# Patient Record
Sex: FEMALE | Race: OTHER | NOT HISPANIC OR LATINO
[De-identification: names, ages, dates, MRNs, and addresses within clinical notes are randomized per-mention and may not be internally consistent; named-entity substitution may affect disease eponyms.]

---

## 2017-01-03 ENCOUNTER — RX RENEWAL (OUTPATIENT)
Age: 57
End: 2017-01-03

## 2017-01-10 ENCOUNTER — LABORATORY RESULT (OUTPATIENT)
Age: 57
End: 2017-01-10

## 2017-01-10 ENCOUNTER — OUTPATIENT (OUTPATIENT)
Dept: OUTPATIENT SERVICES | Facility: HOSPITAL | Age: 57
LOS: 1 days | End: 2017-01-10
Payer: MEDICAID

## 2017-01-10 ENCOUNTER — APPOINTMENT (OUTPATIENT)
Dept: INTERNAL MEDICINE | Facility: CLINIC | Age: 57
End: 2017-01-10

## 2017-01-10 VITALS
SYSTOLIC BLOOD PRESSURE: 110 MMHG | WEIGHT: 170 LBS | BODY MASS INDEX: 29.75 KG/M2 | DIASTOLIC BLOOD PRESSURE: 50 MMHG | HEIGHT: 63.5 IN | HEART RATE: 84 BPM

## 2017-01-10 DIAGNOSIS — I10 ESSENTIAL (PRIMARY) HYPERTENSION: ICD-10-CM

## 2017-01-10 DIAGNOSIS — Z98.89 OTHER SPECIFIED POSTPROCEDURAL STATES: Chronic | ICD-10-CM

## 2017-01-10 PROCEDURE — G0463: CPT

## 2017-01-11 DIAGNOSIS — E11.9 TYPE 2 DIABETES MELLITUS WITHOUT COMPLICATIONS: ICD-10-CM

## 2017-01-11 DIAGNOSIS — Z00.00 ENCOUNTER FOR GENERAL ADULT MEDICAL EXAMINATION WITHOUT ABNORMAL FINDINGS: ICD-10-CM

## 2017-01-11 DIAGNOSIS — R23.2 FLUSHING: ICD-10-CM

## 2017-01-16 ENCOUNTER — FORM ENCOUNTER (OUTPATIENT)
Age: 57
End: 2017-01-16

## 2017-01-17 ENCOUNTER — APPOINTMENT (OUTPATIENT)
Dept: MAMMOGRAPHY | Facility: IMAGING CENTER | Age: 57
End: 2017-01-17

## 2017-01-17 ENCOUNTER — OUTPATIENT (OUTPATIENT)
Dept: OUTPATIENT SERVICES | Facility: HOSPITAL | Age: 57
LOS: 1 days | End: 2017-01-17
Payer: MEDICAID

## 2017-01-17 DIAGNOSIS — Z00.8 ENCOUNTER FOR OTHER GENERAL EXAMINATION: ICD-10-CM

## 2017-01-17 DIAGNOSIS — Z98.89 OTHER SPECIFIED POSTPROCEDURAL STATES: Chronic | ICD-10-CM

## 2017-01-17 PROCEDURE — 77063 BREAST TOMOSYNTHESIS BI: CPT

## 2017-01-17 PROCEDURE — 77067 SCR MAMMO BI INCL CAD: CPT

## 2017-01-24 ENCOUNTER — LABORATORY RESULT (OUTPATIENT)
Age: 57
End: 2017-01-24

## 2017-01-24 ENCOUNTER — OUTPATIENT (OUTPATIENT)
Dept: OUTPATIENT SERVICES | Facility: HOSPITAL | Age: 57
LOS: 1 days | End: 2017-01-24
Payer: MEDICAID

## 2017-01-24 ENCOUNTER — APPOINTMENT (OUTPATIENT)
Dept: INTERNAL MEDICINE | Facility: CLINIC | Age: 57
End: 2017-01-24

## 2017-01-24 VITALS
RESPIRATION RATE: 14 BRPM | DIASTOLIC BLOOD PRESSURE: 60 MMHG | HEART RATE: 80 BPM | SYSTOLIC BLOOD PRESSURE: 110 MMHG | WEIGHT: 166 LBS | BODY MASS INDEX: 28.94 KG/M2

## 2017-01-24 DIAGNOSIS — I10 ESSENTIAL (PRIMARY) HYPERTENSION: ICD-10-CM

## 2017-01-24 DIAGNOSIS — R11.0 NAUSEA: ICD-10-CM

## 2017-01-24 DIAGNOSIS — Z98.89 OTHER SPECIFIED POSTPROCEDURAL STATES: Chronic | ICD-10-CM

## 2017-01-24 LAB
HCT VFR BLD CALC: 39.4 % — SIGNIFICANT CHANGE UP (ref 34.5–45)
HGB BLD-MCNC: 12.3 G/DL — SIGNIFICANT CHANGE UP (ref 11.5–15.5)
MCHC RBC-ENTMCNC: 27.8 PG — SIGNIFICANT CHANGE UP (ref 27–34)
MCHC RBC-ENTMCNC: 31.2 GM/DL — LOW (ref 32–36)
MCV RBC AUTO: 88.9 FL — SIGNIFICANT CHANGE UP (ref 80–100)
PLATELET # BLD AUTO: 251 K/UL — SIGNIFICANT CHANGE UP (ref 150–400)
RBC # BLD: 4.43 M/UL — SIGNIFICANT CHANGE UP (ref 3.8–5.2)
RBC # FLD: 12.8 % — SIGNIFICANT CHANGE UP (ref 10.3–14.5)
WBC # BLD: 9.31 K/UL — SIGNIFICANT CHANGE UP (ref 3.8–10.5)
WBC # FLD AUTO: 9.31 K/UL — SIGNIFICANT CHANGE UP (ref 3.8–10.5)

## 2017-01-24 PROCEDURE — 86765 RUBEOLA ANTIBODY: CPT

## 2017-01-24 PROCEDURE — 81001 URINALYSIS AUTO W/SCOPE: CPT

## 2017-01-24 PROCEDURE — 86480 TB TEST CELL IMMUN MEASURE: CPT

## 2017-01-24 PROCEDURE — 85027 COMPLETE CBC AUTOMATED: CPT

## 2017-01-24 PROCEDURE — G0463: CPT

## 2017-01-24 PROCEDURE — 86762 RUBELLA ANTIBODY: CPT

## 2017-01-25 DIAGNOSIS — Z02.1 ENCOUNTER FOR PRE-EMPLOYMENT EXAMINATION: ICD-10-CM

## 2017-01-25 DIAGNOSIS — E66.3 OVERWEIGHT: ICD-10-CM

## 2017-01-25 DIAGNOSIS — E11.9 TYPE 2 DIABETES MELLITUS WITHOUT COMPLICATIONS: ICD-10-CM

## 2017-01-25 LAB
ALBUMIN SERPL ELPH-MCNC: 4.7 G/DL
ALP BLD-CCNC: 80 U/L
ALT SERPL-CCNC: 21 U/L
ANION GAP SERPL CALC-SCNC: 17 MMOL/L
APPEARANCE UR: CLEAR — SIGNIFICANT CHANGE UP
AST SERPL-CCNC: 23 U/L
BACTERIA # UR AUTO: NEGATIVE — SIGNIFICANT CHANGE UP
BASOPHILS # BLD AUTO: 0.03 K/UL
BASOPHILS NFR BLD AUTO: 0.3 %
BILIRUB SERPL-MCNC: 0.3 MG/DL
BILIRUB UR-MCNC: NEGATIVE — SIGNIFICANT CHANGE UP
BUN SERPL-MCNC: 11 MG/DL
CALCIUM SERPL-MCNC: 10.9 MG/DL
CHLORIDE SERPL-SCNC: 98 MMOL/L
CO2 SERPL-SCNC: 24 MMOL/L
COLOR SPEC: YELLOW — SIGNIFICANT CHANGE UP
CREAT SERPL-MCNC: 0.86 MG/DL
DIFF PNL FLD: NEGATIVE — SIGNIFICANT CHANGE UP
EOSINOPHIL # BLD AUTO: 0.12 K/UL
EOSINOPHIL NFR BLD AUTO: 1.3 %
EPI CELLS # UR: 1 /HPF — SIGNIFICANT CHANGE UP (ref 0–5)
GLUCOSE SERPL-MCNC: 93 MG/DL
GLUCOSE UR QL: NEGATIVE MG/DL — SIGNIFICANT CHANGE UP
HCT VFR BLD CALC: 40.7 %
HGB BLD-MCNC: 12.5 G/DL
IMM GRANULOCYTES NFR BLD AUTO: 0.2 %
KETONES UR-MCNC: NEGATIVE — SIGNIFICANT CHANGE UP
LEUKOCYTE ESTERASE UR-ACNC: NEGATIVE — SIGNIFICANT CHANGE UP
LYMPHOCYTES # BLD AUTO: 3.83 K/UL
LYMPHOCYTES NFR BLD AUTO: 40.2 %
MAN DIFF?: NORMAL
MCHC RBC-ENTMCNC: 27.5 PG
MCHC RBC-ENTMCNC: 30.7 GM/DL
MCV RBC AUTO: 89.6 FL
MEV IGG SER-ACNC: >300 AU/ML — SIGNIFICANT CHANGE UP
MEV IGG+IGM SER-IMP: POSITIVE — SIGNIFICANT CHANGE UP
MONOCYTES # BLD AUTO: 0.79 K/UL
MONOCYTES NFR BLD AUTO: 8.3 %
NEUTROPHILS # BLD AUTO: 4.74 K/UL
NEUTROPHILS NFR BLD AUTO: 49.7 %
NITRITE UR-MCNC: NEGATIVE — SIGNIFICANT CHANGE UP
PH UR: 7.5 — SIGNIFICANT CHANGE UP (ref 5–8)
PLATELET # BLD AUTO: 295 K/UL
POTASSIUM SERPL-SCNC: 5.5 MMOL/L
PROT SERPL-MCNC: 7.8 G/DL
PROT UR-MCNC: NEGATIVE MG/DL — SIGNIFICANT CHANGE UP
RBC # BLD: 4.54 M/UL
RBC # FLD: 12.7 %
RBC CASTS # UR COMP ASSIST: 0 /HPF — SIGNIFICANT CHANGE UP (ref 0–4)
RUBV IGG SER-ACNC: 13.5 INDEX — SIGNIFICANT CHANGE UP
RUBV IGG SER-IMP: POSITIVE — SIGNIFICANT CHANGE UP
SODIUM SERPL-SCNC: 139 MMOL/L
SP GR SPEC: 1.01 — SIGNIFICANT CHANGE UP (ref 1.01–1.02)
TSH SERPL-ACNC: 1 UIU/ML
UROBILINOGEN FLD QL: NEGATIVE MG/DL — SIGNIFICANT CHANGE UP
WBC # FLD AUTO: 9.53 K/UL
WBC UR QL: 1 /HPF — SIGNIFICANT CHANGE UP (ref 0–5)

## 2017-01-26 LAB
M TB TUBERC IFN-G BLD QL: -0.11 IU/ML — SIGNIFICANT CHANGE UP
M TB TUBERC IFN-G BLD QL: 0.24 IU/ML — SIGNIFICANT CHANGE UP
M TB TUBERC IFN-G BLD QL: NEGATIVE — SIGNIFICANT CHANGE UP
MITOGEN IGNF BCKGRD COR BLD-ACNC: >10 IU/ML — SIGNIFICANT CHANGE UP

## 2017-01-30 ENCOUNTER — APPOINTMENT (OUTPATIENT)
Dept: INTERNAL MEDICINE | Facility: CLINIC | Age: 57
End: 2017-01-30

## 2017-03-11 ENCOUNTER — INPATIENT (INPATIENT)
Facility: HOSPITAL | Age: 57
LOS: 3 days | Discharge: HOME | DRG: 247 | End: 2017-03-15
Attending: HOSPITALIST | Admitting: INTERNAL MEDICINE
Payer: MEDICAID

## 2017-03-11 VITALS
DIASTOLIC BLOOD PRESSURE: 75 MMHG | TEMPERATURE: 98 F | HEART RATE: 89 BPM | RESPIRATION RATE: 18 BRPM | OXYGEN SATURATION: 100 % | SYSTOLIC BLOOD PRESSURE: 139 MMHG

## 2017-03-11 DIAGNOSIS — Z98.89 OTHER SPECIFIED POSTPROCEDURAL STATES: Chronic | ICD-10-CM

## 2017-03-11 LAB
ALBUMIN SERPL ELPH-MCNC: 4.4 G/DL — SIGNIFICANT CHANGE UP (ref 3.3–5)
ALP SERPL-CCNC: 87 U/L — SIGNIFICANT CHANGE UP (ref 40–120)
ALT FLD-CCNC: 19 U/L RC — SIGNIFICANT CHANGE UP (ref 10–45)
ANION GAP SERPL CALC-SCNC: 19 MMOL/L — HIGH (ref 5–17)
AST SERPL-CCNC: 18 U/L — SIGNIFICANT CHANGE UP (ref 10–40)
BASOPHILS # BLD AUTO: 0 K/UL — SIGNIFICANT CHANGE UP (ref 0–0.2)
BASOPHILS NFR BLD AUTO: 0.2 % — SIGNIFICANT CHANGE UP (ref 0–2)
BILIRUB SERPL-MCNC: 0.3 MG/DL — SIGNIFICANT CHANGE UP (ref 0.2–1.2)
BUN SERPL-MCNC: 14 MG/DL — SIGNIFICANT CHANGE UP (ref 7–23)
CALCIUM SERPL-MCNC: 10 MG/DL — SIGNIFICANT CHANGE UP (ref 8.4–10.5)
CHLORIDE SERPL-SCNC: 97 MMOL/L — SIGNIFICANT CHANGE UP (ref 96–108)
CO2 SERPL-SCNC: 22 MMOL/L — SIGNIFICANT CHANGE UP (ref 22–31)
CREAT SERPL-MCNC: 0.8 MG/DL — SIGNIFICANT CHANGE UP (ref 0.5–1.3)
D DIMER BLD IA.RAPID-MCNC: 151 NG/ML DDU — SIGNIFICANT CHANGE UP
EOSINOPHIL # BLD AUTO: 0.1 K/UL — SIGNIFICANT CHANGE UP (ref 0–0.5)
EOSINOPHIL NFR BLD AUTO: 0.8 % — SIGNIFICANT CHANGE UP (ref 0–6)
GLUCOSE SERPL-MCNC: 177 MG/DL — HIGH (ref 70–99)
HCT VFR BLD CALC: 35.9 % — SIGNIFICANT CHANGE UP (ref 34.5–45)
HGB BLD-MCNC: 11.6 G/DL — SIGNIFICANT CHANGE UP (ref 11.5–15.5)
LYMPHOCYTES # BLD AUTO: 2.4 K/UL — SIGNIFICANT CHANGE UP (ref 1–3.3)
LYMPHOCYTES # BLD AUTO: 27.6 % — SIGNIFICANT CHANGE UP (ref 13–44)
MCHC RBC-ENTMCNC: 27.9 PG — SIGNIFICANT CHANGE UP (ref 27–34)
MCHC RBC-ENTMCNC: 32.5 GM/DL — SIGNIFICANT CHANGE UP (ref 32–36)
MCV RBC AUTO: 86.1 FL — SIGNIFICANT CHANGE UP (ref 80–100)
MONOCYTES # BLD AUTO: 0.8 K/UL — SIGNIFICANT CHANGE UP (ref 0–0.9)
MONOCYTES NFR BLD AUTO: 8.8 % — SIGNIFICANT CHANGE UP (ref 2–14)
NEUTROPHILS # BLD AUTO: 5.4 K/UL — SIGNIFICANT CHANGE UP (ref 1.8–7.4)
NEUTROPHILS NFR BLD AUTO: 62.6 % — SIGNIFICANT CHANGE UP (ref 43–77)
NT-PROBNP SERPL-SCNC: 35 PG/ML — SIGNIFICANT CHANGE UP (ref 0–300)
PLATELET # BLD AUTO: 246 K/UL — SIGNIFICANT CHANGE UP (ref 150–400)
POTASSIUM SERPL-MCNC: 4.3 MMOL/L — SIGNIFICANT CHANGE UP (ref 3.5–5.3)
POTASSIUM SERPL-SCNC: 4.3 MMOL/L — SIGNIFICANT CHANGE UP (ref 3.5–5.3)
PROT SERPL-MCNC: 7.7 G/DL — SIGNIFICANT CHANGE UP (ref 6–8.3)
RBC # BLD: 4.17 M/UL — SIGNIFICANT CHANGE UP (ref 3.8–5.2)
RBC # FLD: 11.2 % — SIGNIFICANT CHANGE UP (ref 10.3–14.5)
SODIUM SERPL-SCNC: 138 MMOL/L — SIGNIFICANT CHANGE UP (ref 135–145)
TROPONIN T SERPL-MCNC: <0.01 NG/ML — SIGNIFICANT CHANGE UP (ref 0–0.06)
TROPONIN T SERPL-MCNC: <0.01 NG/ML — SIGNIFICANT CHANGE UP (ref 0–0.06)
WBC # BLD: 8.7 K/UL — SIGNIFICANT CHANGE UP (ref 3.8–10.5)
WBC # FLD AUTO: 8.7 K/UL — SIGNIFICANT CHANGE UP (ref 3.8–10.5)

## 2017-03-11 PROCEDURE — 71020: CPT | Mod: 26

## 2017-03-11 PROCEDURE — 93010 ELECTROCARDIOGRAM REPORT: CPT

## 2017-03-11 PROCEDURE — 99220: CPT | Mod: 25

## 2017-03-11 RX ORDER — GLUCAGON INJECTION, SOLUTION 0.5 MG/.1ML
1 INJECTION, SOLUTION SUBCUTANEOUS ONCE
Qty: 0 | Refills: 0 | Status: DISCONTINUED | OUTPATIENT
Start: 2017-03-11 | End: 2017-03-15

## 2017-03-11 RX ORDER — INSULIN GLARGINE 100 [IU]/ML
15 INJECTION, SOLUTION SUBCUTANEOUS AT BEDTIME
Qty: 0 | Refills: 0 | Status: DISCONTINUED | OUTPATIENT
Start: 2017-03-11 | End: 2017-03-15

## 2017-03-11 RX ORDER — IPRATROPIUM/ALBUTEROL SULFATE 18-103MCG
3 AEROSOL WITH ADAPTER (GRAM) INHALATION ONCE
Qty: 0 | Refills: 0 | Status: COMPLETED | OUTPATIENT
Start: 2017-03-11 | End: 2017-03-11

## 2017-03-11 RX ORDER — ATORVASTATIN CALCIUM 80 MG/1
20 TABLET, FILM COATED ORAL AT BEDTIME
Qty: 0 | Refills: 0 | Status: DISCONTINUED | OUTPATIENT
Start: 2017-03-11 | End: 2017-03-12

## 2017-03-11 RX ORDER — SODIUM CHLORIDE 9 MG/ML
1000 INJECTION, SOLUTION INTRAVENOUS
Qty: 0 | Refills: 0 | Status: DISCONTINUED | OUTPATIENT
Start: 2017-03-11 | End: 2017-03-15

## 2017-03-11 RX ORDER — INSULIN LISPRO 100/ML
VIAL (ML) SUBCUTANEOUS
Qty: 0 | Refills: 0 | Status: DISCONTINUED | OUTPATIENT
Start: 2017-03-11 | End: 2017-03-15

## 2017-03-11 RX ORDER — LISINOPRIL 2.5 MG/1
10 TABLET ORAL DAILY
Qty: 0 | Refills: 0 | Status: DISCONTINUED | OUTPATIENT
Start: 2017-03-11 | End: 2017-03-15

## 2017-03-11 RX ORDER — DEXTROSE 50 % IN WATER 50 %
1 SYRINGE (ML) INTRAVENOUS ONCE
Qty: 0 | Refills: 0 | Status: DISCONTINUED | OUTPATIENT
Start: 2017-03-11 | End: 2017-03-15

## 2017-03-11 RX ORDER — METFORMIN HYDROCHLORIDE 850 MG/1
1000 TABLET ORAL
Qty: 0 | Refills: 0 | Status: DISCONTINUED | OUTPATIENT
Start: 2017-03-11 | End: 2017-03-12

## 2017-03-11 RX ORDER — INSULIN LISPRO 100/ML
15 VIAL (ML) SUBCUTANEOUS
Qty: 0 | Refills: 0 | Status: DISCONTINUED | OUTPATIENT
Start: 2017-03-11 | End: 2017-03-13

## 2017-03-11 RX ORDER — FLUTICASONE PROPIONATE AND SALMETEROL 50; 250 UG/1; UG/1
1 POWDER ORAL; RESPIRATORY (INHALATION)
Qty: 0 | Refills: 0 | Status: DISCONTINUED | OUTPATIENT
Start: 2017-03-11 | End: 2017-03-15

## 2017-03-11 RX ORDER — ALBUTEROL 90 UG/1
2 AEROSOL, METERED ORAL EVERY 6 HOURS
Qty: 0 | Refills: 0 | Status: DISCONTINUED | OUTPATIENT
Start: 2017-03-11 | End: 2017-03-15

## 2017-03-11 RX ORDER — ASPIRIN/CALCIUM CARB/MAGNESIUM 324 MG
243 TABLET ORAL ONCE
Qty: 0 | Refills: 0 | Status: COMPLETED | OUTPATIENT
Start: 2017-03-11 | End: 2017-03-11

## 2017-03-11 RX ORDER — DEXTROSE 50 % IN WATER 50 %
25 SYRINGE (ML) INTRAVENOUS ONCE
Qty: 0 | Refills: 0 | Status: DISCONTINUED | OUTPATIENT
Start: 2017-03-11 | End: 2017-03-15

## 2017-03-11 RX ORDER — ASPIRIN/CALCIUM CARB/MAGNESIUM 324 MG
81 TABLET ORAL DAILY
Qty: 0 | Refills: 0 | Status: DISCONTINUED | OUTPATIENT
Start: 2017-03-11 | End: 2017-03-15

## 2017-03-11 RX ORDER — ACETAMINOPHEN 500 MG
975 TABLET ORAL ONCE
Qty: 0 | Refills: 0 | Status: COMPLETED | OUTPATIENT
Start: 2017-03-11 | End: 2017-03-11

## 2017-03-11 RX ORDER — SODIUM CHLORIDE 9 MG/ML
3 INJECTION INTRAMUSCULAR; INTRAVENOUS; SUBCUTANEOUS ONCE
Qty: 0 | Refills: 0 | Status: COMPLETED | OUTPATIENT
Start: 2017-03-11 | End: 2017-03-11

## 2017-03-11 RX ORDER — DEXTROSE 50 % IN WATER 50 %
12.5 SYRINGE (ML) INTRAVENOUS ONCE
Qty: 0 | Refills: 0 | Status: DISCONTINUED | OUTPATIENT
Start: 2017-03-11 | End: 2017-03-15

## 2017-03-11 RX ADMIN — Medication 243 MILLIGRAM(S): at 15:46

## 2017-03-11 RX ADMIN — METFORMIN HYDROCHLORIDE 1000 MILLIGRAM(S): 850 TABLET ORAL at 18:14

## 2017-03-11 RX ADMIN — Medication 975 MILLIGRAM(S): at 15:46

## 2017-03-11 RX ADMIN — Medication 975 MILLIGRAM(S): at 15:47

## 2017-03-11 RX ADMIN — Medication 2: at 18:13

## 2017-03-11 RX ADMIN — Medication 3 MILLILITER(S): at 15:46

## 2017-03-11 RX ADMIN — ALBUTEROL 2 PUFF(S): 90 AEROSOL, METERED ORAL at 18:03

## 2017-03-11 RX ADMIN — INSULIN GLARGINE 15 UNIT(S): 100 INJECTION, SOLUTION SUBCUTANEOUS at 21:37

## 2017-03-11 RX ADMIN — Medication 15 UNIT(S): at 18:13

## 2017-03-11 RX ADMIN — FLUTICASONE PROPIONATE AND SALMETEROL 1 DOSE(S): 50; 250 POWDER ORAL; RESPIRATORY (INHALATION) at 18:03

## 2017-03-11 RX ADMIN — ATORVASTATIN CALCIUM 20 MILLIGRAM(S): 80 TABLET, FILM COATED ORAL at 21:38

## 2017-03-11 RX ADMIN — SODIUM CHLORIDE 3 MILLILITER(S): 9 INJECTION INTRAMUSCULAR; INTRAVENOUS; SUBCUTANEOUS at 15:46

## 2017-03-11 NOTE — ED CDU PROVIDER NOTE - PROGRESS NOTE DETAILS
CDU PROGRESS NOTE PANCHO COLUNGA: Received pt from PANCHO Benitez at 1900 sign-out. Pt resting in stretcher in NAD. Case/plan reviewed. VSS. Pt ate dinner. Ambulatory around unit with steady gait. S1 S2 noted, RRR, lungs CTA b/l, BS x4 with soft, nontender abdomen. Pt without complaints. Will continue to monitor overnight. CDU NOTE PANCHO COLUNGA: Pt resting comfortably, feeling well without complaint. NAD, VSS. No events on telemetry. CDU NOTE PANCHO SHOEMAKER: NAD VSS.  Patient resting comfortably and has no current complaints. no events on tele. awaiting stress. .patient at stress. - Patricia Benitez PA-C MD Allen:  I have personally performed a face to face diagnostic evaluation on this patient with the PA.  I have reviewed the ACP note and agree with the history, exam, and plan of care, except as noted.  History and Exam by me shows a 57yo F, sent to CDU for evaluation 1 wk pleuritic CP.  Workup in ED included (-) ddmier, normal ECG, and unremarkable enzymes.  Send to CDU for stress test.  Symptoms have significantly improved since CDU arrival.  VS - wnl.  Physical Exam: RRR, CTA B, no lower extremity edema.  Impression:  atypical CP; uncertain cause.  Plan:  if stress test wnl --> d/c home to f/u PCP, strict return precautions. spoke with cardiology as well as Dr Allen, will plan for admission for cath. patient apparantly had a near 70% lesion in 2014. patient agrees with plan for admission. - Patricia Benitez PA-C MD Allen:  abnormal stress with preexisting 70% LAD lesion on prior cath.  Plan:  admit for cath tomorrow.

## 2017-03-11 NOTE — ED PROVIDER NOTE - MEDICAL DECISION MAKING DETAILS
Adult female with cp/sob/coug hx asthma, dm,htn,hld concerns for acs,low risk for pe, will do ekg/trop/dimer/cxr. if neg offer pt cdu for stress.

## 2017-03-11 NOTE — ED PROVIDER NOTE - OBJECTIVE STATEMENT
865 clinic  56y female pmh Asthma, DM, HLD, no habits, travel, cancer. Born [place], [place] 15y, Employed [employer], Last cath  no coronary artery disease, ([facility]), Pt comes to ed complains of SOB 1week, with occasional cough, also complains of chest pain worse with breathing. Pain stabbing sharp. rt side of chest. +/- rad to back. No sputum no hemoptysis, No weight loss. No abd pain. No syncope. No palps. Pain 4/10. Constant.Pain lessened if breathing through mouth. Mild ha as well. +flu vaccine  FH Mother 72 alvive HTN, Father  age 72, DM, Sib 2 Brothers A&W, 2 Sisters,  with DM,

## 2017-03-11 NOTE — ED CDU PROVIDER NOTE - FAMILY HISTORY
Mother  Still living? Unknown  Family history of essential hypertension, Age at diagnosis: Age Unknown     Father  Still living? Unknown  Family history of heart disease, Age at diagnosis: Age Unknown  Family history of diabetes mellitus, Age at diagnosis: Age Unknown

## 2017-03-11 NOTE — ED CDU PROVIDER NOTE - PLAN OF CARE
1. Rest. Hydrate. Continue medications as prescribed.   2. Follow up with your PMD at the medicine clinic [phone] in 2-3 days with copies of your results.  3. Return to ER for new or worsened chest pain, difficulty breathing, palpitations, dizziness, drenching sweats, fever, passing out or any concern.

## 2017-03-11 NOTE — ED CDU PROVIDER NOTE - OBJECTIVE STATEMENT
56y female pmh Asthma, DM, HLD, Last cath 2015 with 50% lesion of unknown artery, Pt comes to ed complains of SOB 1week, with occasional cough, also complains of chest pain worse with breathing. Pain stabbing sharp. rt side of chest. +/- rad to back. No sputum no hemoptysis, No weight loss. No abd pain. No syncope. No palps. Pain 4/10. Constant.

## 2017-03-11 NOTE — ED CDU PROVIDER NOTE - MEDICAL DECISION MAKING DETAILS
adult female with dm/htn/hld pw 1wk of chest pain. Constant.worse with resp. Eval for pe pt low risk without tachycaridia/risk factors/hypoxia dimer neg dx excluded.. Pain rad sometimes to back dimer neg at low risk for disection. Check xray. Ekg trop ro acs.

## 2017-03-11 NOTE — ED ADULT NURSE REASSESSMENT NOTE - NS ED NURSE REASSESS COMMENT FT1
Peak Flow 300
pt arrived and oriented to CDU.  Pt A&OX3.  VSS.  No acute distress.  pt NSR 70-80 bpm on cardiac monitor.  pt currently denies any CP/SOB.  pt due to have repeat EKG and CE#2 at 2803.  plan of care explained.  safety maintained.  will cont to monitor.
Received pt from WILL Grimaldo, received pt alert and responsive, oriented x4, denies any respiratory distress, SOB, or difficulty breathing. Pt transferred to CDU for observation for chest pain, pt currently denies pain or discomfort pt is SR on tele 80's, pt pending 2nd CE with EKG at 2145, pending stress test in AM. IV in place, patent and free of signs of infiltration,  pt denies chest pain or palpitations, V/S stable, pt afebrile, pt denies pain at this time. Pt educated on unit and unit rules, instructed patient to notify RN of any needed assistance, Pt verbalizes understanding, Call bell placed within reach. Safety maintained. Will continue to monitor.

## 2017-03-11 NOTE — ED CDU PROVIDER NOTE - ATTENDING CONTRIBUTION TO CARE
I have personally performed a face to face diagnostic evaluation on this patient.  I have reviewed the ACP note and agree with the history, exam, and plan of care, except as noted.  History and Exam by me shows  See ED provider note  Jose Alejandro Frausto MD, Facep

## 2017-03-11 NOTE — ED ADULT NURSE NOTE - OBJECTIVE STATEMENT
55yo female pt AxOx3 ambulatory to ED c/o SOB x1wk. Pt states little to no relief from use of inhalers. Pt c/o CP upon inspiration. Denies N/V/fever/chills. B/L lungs CTA, resp even, unlabored. No resp distress noted. VSS. Afebrile. Safety maintained. MD at bedside for eval. Family at bedside. Awaiting dispo.

## 2017-03-12 DIAGNOSIS — R07.9 CHEST PAIN, UNSPECIFIED: ICD-10-CM

## 2017-03-12 DIAGNOSIS — Z41.8 ENCOUNTER FOR OTHER PROCEDURES FOR PURPOSES OTHER THAN REMEDYING HEALTH STATE: ICD-10-CM

## 2017-03-12 DIAGNOSIS — I20.0 UNSTABLE ANGINA: ICD-10-CM

## 2017-03-12 DIAGNOSIS — I10 ESSENTIAL (PRIMARY) HYPERTENSION: ICD-10-CM

## 2017-03-12 DIAGNOSIS — I25.10 ATHEROSCLEROTIC HEART DISEASE OF NATIVE CORONARY ARTERY WITHOUT ANGINA PECTORIS: ICD-10-CM

## 2017-03-12 DIAGNOSIS — E11.9 TYPE 2 DIABETES MELLITUS WITHOUT COMPLICATIONS: ICD-10-CM

## 2017-03-12 DIAGNOSIS — E78.5 HYPERLIPIDEMIA, UNSPECIFIED: ICD-10-CM

## 2017-03-12 DIAGNOSIS — J45.909 UNSPECIFIED ASTHMA, UNCOMPLICATED: ICD-10-CM

## 2017-03-12 LAB
CHOLEST SERPL-MCNC: 126 MG/DL — SIGNIFICANT CHANGE UP (ref 10–199)
HBA1C BLD-MCNC: 8.9 % — HIGH (ref 4–5.6)
HDLC SERPL-MCNC: 64 MG/DL — SIGNIFICANT CHANGE UP (ref 40–125)
LIPID PNL WITH DIRECT LDL SERPL: 44 MG/DL — SIGNIFICANT CHANGE UP
TOTAL CHOLESTEROL/HDL RATIO MEASUREMENT: 2 RATIO — LOW (ref 3.3–7.1)
TRIGL SERPL-MCNC: 90 MG/DL — SIGNIFICANT CHANGE UP (ref 10–149)

## 2017-03-12 PROCEDURE — 93010 ELECTROCARDIOGRAM REPORT: CPT | Mod: 76

## 2017-03-12 PROCEDURE — 99217: CPT

## 2017-03-12 PROCEDURE — 78452 HT MUSCLE IMAGE SPECT MULT: CPT | Mod: 26

## 2017-03-12 PROCEDURE — 99223 1ST HOSP IP/OBS HIGH 75: CPT | Mod: GC

## 2017-03-12 PROCEDURE — 93016 CV STRESS TEST SUPVJ ONLY: CPT

## 2017-03-12 PROCEDURE — 93018 CV STRESS TEST I&R ONLY: CPT

## 2017-03-12 RX ORDER — ATORVASTATIN CALCIUM 80 MG/1
80 TABLET, FILM COATED ORAL AT BEDTIME
Qty: 0 | Refills: 0 | Status: DISCONTINUED | OUTPATIENT
Start: 2017-03-12 | End: 2017-03-13

## 2017-03-12 RX ORDER — METOPROLOL TARTRATE 50 MG
25 TABLET ORAL
Qty: 0 | Refills: 0 | Status: DISCONTINUED | OUTPATIENT
Start: 2017-03-12 | End: 2017-03-15

## 2017-03-12 RX ADMIN — Medication 81 MILLIGRAM(S): at 13:04

## 2017-03-12 RX ADMIN — LISINOPRIL 10 MILLIGRAM(S): 2.5 TABLET ORAL at 13:04

## 2017-03-12 RX ADMIN — INSULIN GLARGINE 15 UNIT(S): 100 INJECTION, SOLUTION SUBCUTANEOUS at 22:34

## 2017-03-12 RX ADMIN — Medication: at 18:03

## 2017-03-12 RX ADMIN — Medication 15 UNIT(S): at 18:03

## 2017-03-12 RX ADMIN — Medication 5: at 13:04

## 2017-03-12 RX ADMIN — FLUTICASONE PROPIONATE AND SALMETEROL 1 DOSE(S): 50; 250 POWDER ORAL; RESPIRATORY (INHALATION) at 23:00

## 2017-03-12 RX ADMIN — Medication 15 UNIT(S): at 13:05

## 2017-03-12 RX ADMIN — ATORVASTATIN CALCIUM 80 MILLIGRAM(S): 80 TABLET, FILM COATED ORAL at 22:34

## 2017-03-12 RX ADMIN — Medication 25 MILLIGRAM(S): at 22:34

## 2017-03-12 RX ADMIN — FLUTICASONE PROPIONATE AND SALMETEROL 1 DOSE(S): 50; 250 POWDER ORAL; RESPIRATORY (INHALATION) at 05:07

## 2017-03-12 RX ADMIN — METFORMIN HYDROCHLORIDE 1000 MILLIGRAM(S): 850 TABLET ORAL at 13:03

## 2017-03-12 NOTE — H&P ADULT. - PROBLEM SELECTOR PLAN 1
On admission negative EKG with negative Dionicio x2. NM cardiac stress test revealed mild defects in the anteroapical and apical lateral walls and after correlation with previous catheterization, 50% stenosis in PLAD may correlate.  -F/u Cards recs on medical management and catheterization   -Will continue aspirin and statin On admission negative EKG with negative Dionicio x2 but endorsing chest pain at rest and exertion. NM cardiac stress test revealed mild defects in the anteroapical and apical lateral walls and after correlation with previous catheterization, 50% stenosis in PLAD may correlate.  -Cardiology didn't recommend loading with heparin or plavix as she doesn't endorse chest pain at this time   -Will continue aspirin and statin On admission negative EKG with negative Dionicio x2 but endorsing chest pain at rest and exertion. NM cardiac stress test revealed mild defects in the anteroapical and apical lateral walls and after correlation with previous catheterization, 50% stenosis in PLAD may correlate.  -Cardiology didn't recommend loading with heparin or plavix as she doesn't endorse chest pain at this time   -Will continue aspirin 81, increase lipitor to 80mg qHS, and start metoprolol 25 BID

## 2017-03-12 NOTE — H&P ADULT. - PROBLEM SELECTOR PLAN 2
[place] PRN Patient doesn't endorse wheezing, doesn't appear to be acute asthma exacerbation  Inhalers PRN

## 2017-03-12 NOTE — H&P ADULT. - ASSESSMENT
Pt is a 57 yo F with HLD, T2DM, CAD (non-occlusive on cath in 2014 at [facility]) and asthma that presented to the ED with SOB with pleuritic chest pain. No EKG changes on admission with negative cardiac enzymes but NM cardiac stress test revealed mild defects in the anteroapical and apical lateral walls and after correlation with previous catheterization cardiology recommended further workup.

## 2017-03-12 NOTE — H&P ADULT. - PROBLEM SELECTOR PLAN 3
A1C 8.9  On A1C 8.9  On Tujeo 15 BID with Humalog TID outpatient along with Metformin   Will be conservative giving 15 Lantus at Bedtime and 5 Humalog premeal and titrate up as needed Type 2  HgA1C 8.9  On Tujeo 15 BID with Humalog TID outpatient along with Metformin   Will be conservative giving 15 Lantus at Bedtime and 5 Humalog premeal and titrate up as needed

## 2017-03-12 NOTE — H&P ADULT. - OTHER
NM Stress Test   * Baseline ECG: Nonspecific ST-T wave abnormality.  * ECG Changes: There were less than 0.5 mm horizontal ST  segment depressions in leads V4-V6 starting at 4:09 min of  exercise at 139 bpm and persisting 1:00 min in recovery.  These changes did not meet ischemic criteria.  * Arrhythmia: None.  * The left ventricle was normal in size. Technically  difficult study.  There are small, very mild defects in  the anteroapical and apical lateral walls that are  reversible suggestive of minimal ischemia.  * Mildly increased right ventricular tracer uptake is  noted on stress imaging.  * There are also mild defects in the inferior,  inferolateral, and inferoseptal walls that are fixed,  partially correct with prone imaging, and demonstrate  normal wall motion suggestive of attenuation artifacts.  * Post-stress gated wall motion analysis was performed  (LVEF > 70%;LVEDV = 50 ml.) revealing normal overall left  ventricular ejection fraction.

## 2017-03-12 NOTE — H&P ADULT. - FAMILY HISTORY
Father  Still living? Unknown  Family history of heart disease, Age at diagnosis: Age Unknown  Family history of diabetes mellitus, Age at diagnosis: Age Unknown     Mother  Still living? Unknown  Family history of essential hypertension, Age at diagnosis: Age Unknown

## 2017-03-12 NOTE — H&P ADULT. - HISTORY OF PRESENT ILLNESS
Pt is a 55 yo F with HLD, T2DM and asthma that presented to the ED with SOB with pleuritic chest pain. Patient states that over the last week the patient has had persistent SOB. She initially thought it was a viral URI and started taking her inhalers for sx relief but they were not effective. The SOB became associated with pleuritic chest pain. She stated the chest pain was sharp in character and would cause paresthesias in her fingertips but denied radiating pain. She states neither the SOB or the associated chestpain was associated with activi. Denied any fevers, Cough, nasal congestion, indigestion, constipation or dysuria.  In ED VSS. EKG WNL with negative enzymes x2. Cardiac stress test performed small, very mild defects in the anteroapical and apical lateral walls that are reversible suggestive of minimal ischemia.

## 2017-03-13 ENCOUNTER — TRANSCRIPTION ENCOUNTER (OUTPATIENT)
Age: 57
End: 2017-03-13

## 2017-03-13 LAB
ANION GAP SERPL CALC-SCNC: 15 MMOL/L — SIGNIFICANT CHANGE UP (ref 5–17)
APTT BLD: 31.7 SEC — SIGNIFICANT CHANGE UP (ref 27.5–37.4)
BASOPHILS # BLD AUTO: 0 K/UL — SIGNIFICANT CHANGE UP (ref 0–0.2)
BASOPHILS NFR BLD AUTO: 0.4 % — SIGNIFICANT CHANGE UP (ref 0–2)
BUN SERPL-MCNC: 12 MG/DL — SIGNIFICANT CHANGE UP (ref 7–23)
CALCIUM SERPL-MCNC: 9.7 MG/DL — SIGNIFICANT CHANGE UP (ref 8.4–10.5)
CHLORIDE SERPL-SCNC: 98 MMOL/L — SIGNIFICANT CHANGE UP (ref 96–108)
CO2 SERPL-SCNC: 24 MMOL/L — SIGNIFICANT CHANGE UP (ref 22–31)
CREAT SERPL-MCNC: 0.7 MG/DL — SIGNIFICANT CHANGE UP (ref 0.5–1.3)
EOSINOPHIL # BLD AUTO: 0.2 K/UL — SIGNIFICANT CHANGE UP (ref 0–0.5)
EOSINOPHIL NFR BLD AUTO: 1.9 % — SIGNIFICANT CHANGE UP (ref 0–6)
GLUCOSE SERPL-MCNC: 117 MG/DL — HIGH (ref 70–99)
HCT VFR BLD CALC: 35.6 % — SIGNIFICANT CHANGE UP (ref 34.5–45)
HGB BLD-MCNC: 12 G/DL — SIGNIFICANT CHANGE UP (ref 11.5–15.5)
INR BLD: 1.1 RATIO — SIGNIFICANT CHANGE UP (ref 0.88–1.16)
LYMPHOCYTES # BLD AUTO: 2.6 K/UL — SIGNIFICANT CHANGE UP (ref 1–3.3)
LYMPHOCYTES # BLD AUTO: 28.3 % — SIGNIFICANT CHANGE UP (ref 13–44)
MAGNESIUM SERPL-MCNC: 1.5 MG/DL — LOW (ref 1.6–2.6)
MCHC RBC-ENTMCNC: 28.8 PG — SIGNIFICANT CHANGE UP (ref 27–34)
MCHC RBC-ENTMCNC: 33.6 GM/DL — SIGNIFICANT CHANGE UP (ref 32–36)
MCV RBC AUTO: 85.7 FL — SIGNIFICANT CHANGE UP (ref 80–100)
MONOCYTES # BLD AUTO: 0.8 K/UL — SIGNIFICANT CHANGE UP (ref 0–0.9)
MONOCYTES NFR BLD AUTO: 8.8 % — SIGNIFICANT CHANGE UP (ref 2–14)
NEUTROPHILS # BLD AUTO: 5.6 K/UL — SIGNIFICANT CHANGE UP (ref 1.8–7.4)
NEUTROPHILS NFR BLD AUTO: 60.6 % — SIGNIFICANT CHANGE UP (ref 43–77)
PHOSPHATE SERPL-MCNC: 3.6 MG/DL — SIGNIFICANT CHANGE UP (ref 2.5–4.5)
PLATELET # BLD AUTO: 254 K/UL — SIGNIFICANT CHANGE UP (ref 150–400)
POTASSIUM SERPL-MCNC: 4.4 MMOL/L — SIGNIFICANT CHANGE UP (ref 3.5–5.3)
POTASSIUM SERPL-SCNC: 4.4 MMOL/L — SIGNIFICANT CHANGE UP (ref 3.5–5.3)
PROTHROM AB SERPL-ACNC: 12 SEC — SIGNIFICANT CHANGE UP (ref 10–13.1)
RBC # BLD: 4.16 M/UL — SIGNIFICANT CHANGE UP (ref 3.8–5.2)
RBC # FLD: 11.2 % — SIGNIFICANT CHANGE UP (ref 10.3–14.5)
SODIUM SERPL-SCNC: 137 MMOL/L — SIGNIFICANT CHANGE UP (ref 135–145)
WBC # BLD: 9.3 K/UL — SIGNIFICANT CHANGE UP (ref 3.8–10.5)
WBC # FLD AUTO: 9.3 K/UL — SIGNIFICANT CHANGE UP (ref 3.8–10.5)

## 2017-03-13 PROCEDURE — 92928 PRQ TCAT PLMT NTRAC ST 1 LES: CPT | Mod: RC,GC

## 2017-03-13 PROCEDURE — 93458 L HRT ARTERY/VENTRICLE ANGIO: CPT | Mod: 26,59,GC

## 2017-03-13 PROCEDURE — 99232 SBSQ HOSP IP/OBS MODERATE 35: CPT

## 2017-03-13 RX ORDER — MAGNESIUM SULFATE 500 MG/ML
1 VIAL (ML) INJECTION ONCE
Qty: 0 | Refills: 0 | Status: COMPLETED | OUTPATIENT
Start: 2017-03-13 | End: 2017-03-13

## 2017-03-13 RX ORDER — CLOPIDOGREL BISULFATE 75 MG/1
75 TABLET, FILM COATED ORAL DAILY
Qty: 0 | Refills: 0 | Status: DISCONTINUED | OUTPATIENT
Start: 2017-03-14 | End: 2017-03-15

## 2017-03-13 RX ORDER — ATORVASTATIN CALCIUM 80 MG/1
1 TABLET, FILM COATED ORAL
Qty: 0 | Refills: 0 | DISCHARGE
Start: 2017-03-13

## 2017-03-13 RX ORDER — ACETAMINOPHEN 500 MG
650 TABLET ORAL ONCE
Qty: 0 | Refills: 0 | Status: COMPLETED | OUTPATIENT
Start: 2017-03-13 | End: 2017-03-13

## 2017-03-13 RX ORDER — METOPROLOL TARTRATE 50 MG
1 TABLET ORAL
Qty: 60 | Refills: 0
Start: 2017-03-13 | End: 2017-04-12

## 2017-03-13 RX ORDER — ATORVASTATIN CALCIUM 80 MG/1
10 TABLET, FILM COATED ORAL AT BEDTIME
Qty: 0 | Refills: 0 | Status: DISCONTINUED | OUTPATIENT
Start: 2017-03-13 | End: 2017-03-15

## 2017-03-13 RX ADMIN — Medication 25 MILLIGRAM(S): at 06:16

## 2017-03-13 RX ADMIN — ATORVASTATIN CALCIUM 10 MILLIGRAM(S): 80 TABLET, FILM COATED ORAL at 22:19

## 2017-03-13 RX ADMIN — Medication 1: at 09:23

## 2017-03-13 RX ADMIN — FLUTICASONE PROPIONATE AND SALMETEROL 1 DOSE(S): 50; 250 POWDER ORAL; RESPIRATORY (INHALATION) at 19:16

## 2017-03-13 RX ADMIN — LISINOPRIL 10 MILLIGRAM(S): 2.5 TABLET ORAL at 06:16

## 2017-03-13 RX ADMIN — INSULIN GLARGINE 15 UNIT(S): 100 INJECTION, SOLUTION SUBCUTANEOUS at 22:19

## 2017-03-13 RX ADMIN — Medication: at 17:30

## 2017-03-13 RX ADMIN — Medication 650 MILLIGRAM(S): at 06:16

## 2017-03-13 RX ADMIN — Medication 25 MILLIGRAM(S): at 19:16

## 2017-03-13 RX ADMIN — Medication 81 MILLIGRAM(S): at 12:32

## 2017-03-13 RX ADMIN — Medication 650 MILLIGRAM(S): at 07:03

## 2017-03-13 RX ADMIN — Medication 100 GRAM(S): at 12:31

## 2017-03-13 RX ADMIN — FLUTICASONE PROPIONATE AND SALMETEROL 1 DOSE(S): 50; 250 POWDER ORAL; RESPIRATORY (INHALATION) at 06:15

## 2017-03-13 NOTE — DISCHARGE NOTE ADULT - PLAN OF CARE
Medical management Your stress test was positive and your catheterization showed ... Continue with lisinopril Continue with Toujeo, Humalog and Metformin as prescribed Continue with current regimen Your stress test was positive and your catheterization showed evidence of an occlusion in your Right Coronary Artery. You had a drug eluting stent placed. Please take aspirin and plavix as prescribed. Follow up with Dr. Harris in the office for further management. Continue with lisinopril and metoprolol

## 2017-03-13 NOTE — DISCHARGE NOTE ADULT - CARE PROVIDERS DIRECT ADDRESSES
[email or web address]_Unknown [email or web address],DirectAddress_Unknown

## 2017-03-13 NOTE — DISCHARGE NOTE ADULT - CARE PLAN
Principal Discharge DX:	Unstable angina  Goal:	Medical management  Instructions for follow-up, activity and diet:	Your stress test was positive and your catheterization showed ...  Secondary Diagnosis:	Benign Essential Hypertension  Instructions for follow-up, activity and diet:	Continue with lisinopril  Secondary Diagnosis:	Diabetes mellitus  Instructions for follow-up, activity and diet:	Continue with Toujeo, Humalog and Metformin as prescribed  Secondary Diagnosis:	Asthma  Instructions for follow-up, activity and diet:	Continue with current regimen Principal Discharge DX:	Unstable angina  Goal:	Medical management  Instructions for follow-up, activity and diet:	Your stress test was positive and your catheterization showed evidence of an occlusion in your Right Coronary Artery. You had a drug eluting stent placed. Please take aspirin and plavix as prescribed. Follow up with Dr. Harris in the office for further management.  Secondary Diagnosis:	Benign Essential Hypertension  Instructions for follow-up, activity and diet:	Continue with lisinopril and metoprolol  Secondary Diagnosis:	Diabetes mellitus  Instructions for follow-up, activity and diet:	Continue with Toujeo, Humalog and Metformin as prescribed  Secondary Diagnosis:	Asthma  Instructions for follow-up, activity and diet:	Continue with current regimen Principal Discharge DX:	Acute coronary syndrome  Goal:	Medical management  Instructions for follow-up, activity and diet:	Your stress test was positive and your catheterization showed evidence of an occlusion in your Right Coronary Artery. You had a drug eluting stent placed. Please take aspirin and plavix as prescribed. Follow up with Dr. Harris in the office for further management.  Secondary Diagnosis:	Benign Essential Hypertension  Instructions for follow-up, activity and diet:	Continue with lisinopril and metoprolol  Secondary Diagnosis:	Diabetes mellitus  Instructions for follow-up, activity and diet:	Continue with Toujeo, Humalog and Metformin as prescribed  Secondary Diagnosis:	Asthma  Instructions for follow-up, activity and diet:	Continue with current regimen

## 2017-03-13 NOTE — DISCHARGE NOTE ADULT - PATIENT PORTAL LINK FT
“You can access the Follow[facility] Patient Portal, offered by [facility], by registering with the following website: [email or web address]”

## 2017-03-13 NOTE — DISCHARGE NOTE ADULT - HOSPITAL COURSE
Pt is a 57 yo F with HLD, T2DM and asthma that presented to the ED with SOB with pleuritic chest pain. Patient states that over the last week the patient has had persistent SOB. She initially thought it was a viral URI and started taking her inhalers for sx relief but they were not effective. The SOB became associated with pleuritic chest pain. She stated the chest pain was sharp in character and would cause paresthesias in her fingertips but denied radiating pain. She states neither the SOB or the associated chestpain was associated with activi. Denied any fevers, Cough, nasal congestion, indigestion, constipation or dysuria.  In ED VSS. EKG WNL with negative enzymes x2. Cardiac stress test performed small, very mild defects in the anteroapical and apical lateral walls that are reversible suggestive of minimal ischemia. After correlation with previous catheterization cardiology recommended further workup with left heart catheterization with concern for unstable angina so patient. Metoprolol was added to med regimen and was changed to high dose statin to optimize CAD regimen. Left heart catheterization was performed and showed ..... Pt is a 55 yo F with HLD, T2DM and asthma that presented to the ED with SOB with pleuritic chest pain. Patient states that over the last week the patient has had persistent SOB. She initially thought it was a viral URI and started taking her inhalers for sx relief but they were not effective. The SOB became associated with pleuritic chest pain. She stated the chest pain was sharp in character and would cause paresthesias in her fingertips but denied radiating pain. She states neither the SOB or the associated chestpain was associated with activi. Denied any fevers, Cough, nasal congestion, indigestion, constipation or dysuria.  In ED VSS. EKG WNL with negative enzymes x2. Cardiac stress test performed small, very mild defects in the anteroapical and apical lateral walls that are reversible suggestive of minimal ischemia. After correlation with previous catheterization cardiology recommended further workup with left heart catheterization with concern for unstable angina so patient. Metoprolol was added to med regimen and was changed to high dose statin to optimize CAD regimen. Left heart catheterization was performed and showed a right coronary artery occlusion so a ALLEN was placed. Patient was placed on plavix and had right radial band removed. Patient is medically optimized and on proper CAD regimen. Pt is a 57 yo F with HLD, T2DM and asthma that presented to the ED with SOB with pleuritic chest pain. Patient states that over the last week the patient has had persistent SOB. She initially thought it was a viral URI and started taking her inhalers for sx relief but they were not effective. The SOB became associated with pleuritic chest pain. She stated the chest pain was sharp in character and would cause paresthesias in her fingertips but denied radiating pain. She states neither the SOB or the associated chestpain was associated with activity. Denied any fevers, Cough, nasal congestion, indigestion, constipation or dysuria.  In ED VSS. EKG WNL with negative enzymes x2. Cardiac stress test performed small, very mild defects in the anteroapical and apical lateral walls that are reversible suggestive of minimal ischemia. After correlation with previous catheterization cardiology recommended further workup with left heart catheterization with concern for unstable angina/ acute coronary syndrome. Metoprolol was added to med regimen and was changed to high dose statin to optimize CAD regimen. Left heart catheterization was performed and showed a right coronary artery occlusion so a ALLEN was placed. Patient was placed on plavix and had right radial band removed. Patient is medically optimized and on proper CAD regimen.

## 2017-03-13 NOTE — DISCHARGE NOTE ADULT - MEDICATION SUMMARY - MEDICATIONS TO TAKE
I will START or STAY ON the medications listed below when I get home from the hospital:    tuojeo  -- 15 unit(s) subcutaneous 2 times a day  -- Indication: For T2DM    aspirin 81 mg oral tablet, chewable  -- 1 tab(s) by mouth once a day  -- Indication: For CAD (coronary artery disease)    lisinopril 10 mg oral tablet  -- 1 tab(s) by mouth once a day  -- Indication: For HTN    NovoLOG FlexPen 100 units/mL subcutaneous solution  -- 15 unit(s) subcutaneous 3 times a day  -- Indication: For T2DM    metFORMIN 1000 mg oral tablet  -- 1 tab(s) by mouth 2 times a day  -- Indication: For T2DM    atorvastatin 10 mg oral tablet  -- 1 tab(s) by mouth once a day (at bedtime)  -- Indication: For HLD (Hyperlipidemia)    clopidogrel 75 mg oral tablet  -- 1 tab(s) by mouth once a day  -- Indication: For CAD (coronary artery disease)    metoprolol tartrate 25 mg oral tablet  -- 1 tab(s) by mouth 2 times a day  -- Indication: For CAD (coronary artery disease)    Ventolin HFA CFC free 90 mcg/inh inhalation aerosol  -- 2 puff(s) inhaled 4 times a day, As Needed  -- Indication: For Asthma    multivitamin  -- 1 tab(s) by mouth once a day  -- Indication: For ppx

## 2017-03-14 LAB
ANION GAP SERPL CALC-SCNC: 14 MMOL/L — SIGNIFICANT CHANGE UP (ref 5–17)
BUN SERPL-MCNC: 10 MG/DL — SIGNIFICANT CHANGE UP (ref 7–23)
CALCIUM SERPL-MCNC: 9.4 MG/DL — SIGNIFICANT CHANGE UP (ref 8.4–10.5)
CHLORIDE SERPL-SCNC: 98 MMOL/L — SIGNIFICANT CHANGE UP (ref 96–108)
CO2 SERPL-SCNC: 24 MMOL/L — SIGNIFICANT CHANGE UP (ref 22–31)
CREAT SERPL-MCNC: 0.62 MG/DL — SIGNIFICANT CHANGE UP (ref 0.5–1.3)
GLUCOSE SERPL-MCNC: 151 MG/DL — HIGH (ref 70–99)
HCT VFR BLD CALC: 36.2 % — SIGNIFICANT CHANGE UP (ref 34.5–45)
HGB BLD-MCNC: 11.8 G/DL — SIGNIFICANT CHANGE UP (ref 11.5–15.5)
MAGNESIUM SERPL-MCNC: 1.4 MG/DL — LOW (ref 1.6–2.6)
MCHC RBC-ENTMCNC: 28 PG — SIGNIFICANT CHANGE UP (ref 27–34)
MCHC RBC-ENTMCNC: 32.5 GM/DL — SIGNIFICANT CHANGE UP (ref 32–36)
MCV RBC AUTO: 86 FL — SIGNIFICANT CHANGE UP (ref 80–100)
PHOSPHATE SERPL-MCNC: 3 MG/DL — SIGNIFICANT CHANGE UP (ref 2.5–4.5)
PLATELET # BLD AUTO: 268 K/UL — SIGNIFICANT CHANGE UP (ref 150–400)
POTASSIUM SERPL-MCNC: 4.3 MMOL/L — SIGNIFICANT CHANGE UP (ref 3.5–5.3)
POTASSIUM SERPL-SCNC: 4.3 MMOL/L — SIGNIFICANT CHANGE UP (ref 3.5–5.3)
RBC # BLD: 4.21 M/UL — SIGNIFICANT CHANGE UP (ref 3.8–5.2)
RBC # FLD: 10.8 % — SIGNIFICANT CHANGE UP (ref 10.3–14.5)
SODIUM SERPL-SCNC: 136 MMOL/L — SIGNIFICANT CHANGE UP (ref 135–145)
WBC # BLD: 7.9 K/UL — SIGNIFICANT CHANGE UP (ref 3.8–10.5)
WBC # FLD AUTO: 7.9 K/UL — SIGNIFICANT CHANGE UP (ref 3.8–10.5)

## 2017-03-14 PROCEDURE — 93010 ELECTROCARDIOGRAM REPORT: CPT

## 2017-03-14 PROCEDURE — 99233 SBSQ HOSP IP/OBS HIGH 50: CPT | Mod: GC

## 2017-03-14 RX ORDER — MAGNESIUM SULFATE 500 MG/ML
2 VIAL (ML) INJECTION ONCE
Qty: 0 | Refills: 0 | Status: COMPLETED | OUTPATIENT
Start: 2017-03-14 | End: 2017-03-14

## 2017-03-14 RX ORDER — INSULIN LISPRO 100/ML
10 VIAL (ML) SUBCUTANEOUS
Qty: 0 | Refills: 0 | Status: DISCONTINUED | OUTPATIENT
Start: 2017-03-14 | End: 2017-03-15

## 2017-03-14 RX ORDER — INSULIN LISPRO 100/ML
5 VIAL (ML) SUBCUTANEOUS
Qty: 0 | Refills: 0 | Status: DISCONTINUED | OUTPATIENT
Start: 2017-03-14 | End: 2017-03-14

## 2017-03-14 RX ADMIN — Medication 50 GRAM(S): at 08:57

## 2017-03-14 RX ADMIN — INSULIN GLARGINE 15 UNIT(S): 100 INJECTION, SOLUTION SUBCUTANEOUS at 22:15

## 2017-03-14 RX ADMIN — CLOPIDOGREL BISULFATE 75 MILLIGRAM(S): 75 TABLET, FILM COATED ORAL at 08:57

## 2017-03-14 RX ADMIN — Medication 25 MILLIGRAM(S): at 17:58

## 2017-03-14 RX ADMIN — ATORVASTATIN CALCIUM 10 MILLIGRAM(S): 80 TABLET, FILM COATED ORAL at 22:15

## 2017-03-14 RX ADMIN — FLUTICASONE PROPIONATE AND SALMETEROL 1 DOSE(S): 50; 250 POWDER ORAL; RESPIRATORY (INHALATION) at 07:16

## 2017-03-14 RX ADMIN — FLUTICASONE PROPIONATE AND SALMETEROL 1 DOSE(S): 50; 250 POWDER ORAL; RESPIRATORY (INHALATION) at 17:58

## 2017-03-14 RX ADMIN — Medication: at 08:56

## 2017-03-14 RX ADMIN — LISINOPRIL 10 MILLIGRAM(S): 2.5 TABLET ORAL at 07:16

## 2017-03-14 RX ADMIN — Medication 5 UNIT(S): at 13:11

## 2017-03-14 RX ADMIN — Medication 81 MILLIGRAM(S): at 08:57

## 2017-03-14 RX ADMIN — Medication 25 MILLIGRAM(S): at 07:16

## 2017-03-14 RX ADMIN — Medication 10 UNIT(S): at 18:03

## 2017-03-14 RX ADMIN — Medication: at 18:03

## 2017-03-14 RX ADMIN — Medication: at 13:11

## 2017-03-14 NOTE — PROVIDER CONTACT NOTE (OTHER) - ASSESSMENT
Pt A+Ox4. Frequent vitals assess s/p Cath. R radial Cath site benign with no ss of bleeding or hematoma. Pt received dose of lopressor at 1916. No c/o dizziness or discomfort; asymptomatic.

## 2017-03-15 VITALS
DIASTOLIC BLOOD PRESSURE: 77 MMHG | RESPIRATION RATE: 18 BRPM | TEMPERATURE: 98 F | HEART RATE: 67 BPM | SYSTOLIC BLOOD PRESSURE: 112 MMHG | OXYGEN SATURATION: 99 %

## 2017-03-15 PROCEDURE — 99239 HOSP IP/OBS DSCHRG MGMT >30: CPT

## 2017-03-15 RX ORDER — CLOPIDOGREL BISULFATE 75 MG/1
1 TABLET, FILM COATED ORAL
Qty: 31 | Refills: 0
Start: 2017-03-15 | End: 2017-04-15

## 2017-03-15 RX ORDER — ASPIRIN/CALCIUM CARB/MAGNESIUM 324 MG
1 TABLET ORAL
Qty: 31 | Refills: 0
Start: 2017-03-15 | End: 2017-04-15

## 2017-03-15 RX ADMIN — Medication 10 UNIT(S): at 09:07

## 2017-03-15 RX ADMIN — Medication 25 MILLIGRAM(S): at 05:55

## 2017-03-15 RX ADMIN — CLOPIDOGREL BISULFATE 75 MILLIGRAM(S): 75 TABLET, FILM COATED ORAL at 11:09

## 2017-03-15 RX ADMIN — Medication 81 MILLIGRAM(S): at 11:09

## 2017-03-15 RX ADMIN — Medication: at 09:06

## 2017-03-15 RX ADMIN — Medication 10 UNIT(S): at 13:13

## 2017-03-15 RX ADMIN — FLUTICASONE PROPIONATE AND SALMETEROL 1 DOSE(S): 50; 250 POWDER ORAL; RESPIRATORY (INHALATION) at 05:55

## 2017-03-15 RX ADMIN — LISINOPRIL 10 MILLIGRAM(S): 2.5 TABLET ORAL at 05:55

## 2017-03-15 RX ADMIN — Medication: at 13:14

## 2017-03-15 NOTE — DIETITIAN INITIAL EVALUATION ADULT. - NS AS NUTRI INTERV ED CONTENT
Purpose of the nutrition education/Nutrition relationship to health/disease/Recommended modifications/Discussed diabetic meal planning, balanced eating to include CHO and Protein, self monitoring and need to seek out discounted strips

## 2017-03-15 NOTE — DIETITIAN INITIAL EVALUATION ADULT. - PROBLEM SELECTOR PLAN 3
Type 2  HgA1C 8.9  On Tujeo 15 BID with Humalog TID outpatient along with Metformin   Will be conservative giving 15 Lantus at Bedtime and 5 Humalog premeal and titrate up as needed

## 2017-03-15 NOTE — DIETITIAN INITIAL EVALUATION ADULT. - PROBLEM SELECTOR PLAN 1
On admission negative EKG with negative Dionicio x2 but endorsing chest pain at rest and exertion. NM cardiac stress test revealed mild defects in the anteroapical and apical lateral walls and after correlation with previous catheterization, 50% stenosis in PLAD may correlate.  -Cardiology didn't recommend loading with heparin or plavix as she doesn't endorse chest pain at this time   -Will continue aspirin 81, increase lipitor to 80mg qHS, and start metoprolol 25 BID

## 2017-03-15 NOTE — DIETITIAN INITIAL EVALUATION ADULT. - OTHER INFO
Patient referred by  for diabetes management.  Upon review, noted A1c 8.9. Patient found sitting up in bed and very receptive and excited to speak with RD.  Patient reports to be a picky eater and not to eat meat, chicken as odors bother her.   Based on food recall, patient eats more starch than desired and does not complement with Protein foods.  Late breakfast includes a baked potato or sweet potato or banana.  Sometimes she may have cottage cheese with marmalade.  Lunch is fish, white rice and vegetables.  Dinner is inconsistent and may be baked potato or rice with fish.  Likes salads.  Positive weight loss 9 pounds likely due to inconsistent calorie intake and running between jobs.  Does not check fingersticks due cost of strips.  Patient was scared to hear that her A1c was so high and is now motivated to make an effort to eat better.

## 2017-03-17 ENCOUNTER — NON-APPOINTMENT (OUTPATIENT)
Age: 57
End: 2017-03-17

## 2017-03-17 ENCOUNTER — APPOINTMENT (OUTPATIENT)
Dept: CARDIOLOGY | Facility: CLINIC | Age: 57
End: 2017-03-17

## 2017-03-17 VITALS
SYSTOLIC BLOOD PRESSURE: 105 MMHG | OXYGEN SATURATION: 95 % | WEIGHT: 166 LBS | DIASTOLIC BLOOD PRESSURE: 73 MMHG | HEART RATE: 68 BPM | HEIGHT: 63.5 IN | BODY MASS INDEX: 29.05 KG/M2

## 2017-03-21 ENCOUNTER — APPOINTMENT (OUTPATIENT)
Dept: INTERNAL MEDICINE | Facility: CLINIC | Age: 57
End: 2017-03-21

## 2017-03-21 ENCOUNTER — OUTPATIENT (OUTPATIENT)
Dept: OUTPATIENT SERVICES | Facility: HOSPITAL | Age: 57
LOS: 1 days | End: 2017-03-21
Payer: MEDICAID

## 2017-03-21 VITALS
SYSTOLIC BLOOD PRESSURE: 102 MMHG | DIASTOLIC BLOOD PRESSURE: 69 MMHG | WEIGHT: 171 LBS | HEART RATE: 73 BPM | BODY MASS INDEX: 29.82 KG/M2

## 2017-03-21 DIAGNOSIS — I10 ESSENTIAL (PRIMARY) HYPERTENSION: ICD-10-CM

## 2017-03-21 DIAGNOSIS — Z98.89 OTHER SPECIFIED POSTPROCEDURAL STATES: Chronic | ICD-10-CM

## 2017-03-21 PROCEDURE — G0463: CPT

## 2017-03-24 DIAGNOSIS — I25.10 ATHEROSCLEROTIC HEART DISEASE OF NATIVE CORONARY ARTERY WITHOUT ANGINA PECTORIS: ICD-10-CM

## 2017-03-24 DIAGNOSIS — E11.9 TYPE 2 DIABETES MELLITUS WITHOUT COMPLICATIONS: ICD-10-CM

## 2017-03-24 DIAGNOSIS — E78.5 HYPERLIPIDEMIA, UNSPECIFIED: ICD-10-CM

## 2017-03-28 ENCOUNTER — APPOINTMENT (OUTPATIENT)
Dept: OPHTHALMOLOGY | Facility: CLINIC | Age: 57
End: 2017-03-28

## 2017-04-13 ENCOUNTER — MEDICATION RENEWAL (OUTPATIENT)
Age: 57
End: 2017-04-13

## 2017-05-12 ENCOUNTER — NON-APPOINTMENT (OUTPATIENT)
Age: 57
End: 2017-05-12

## 2017-05-12 ENCOUNTER — APPOINTMENT (OUTPATIENT)
Dept: CARDIOLOGY | Facility: CLINIC | Age: 57
End: 2017-05-12

## 2017-05-12 VITALS
DIASTOLIC BLOOD PRESSURE: 77 MMHG | HEIGHT: 63.5 IN | OXYGEN SATURATION: 98 % | WEIGHT: 173 LBS | SYSTOLIC BLOOD PRESSURE: 118 MMHG | BODY MASS INDEX: 30.27 KG/M2 | HEART RATE: 66 BPM

## 2017-05-26 NOTE — PATIENT PROFILE ADULT. - SOURCE OF INFORMATION, PROFILE
,Your current Orthopaedic problem we are working together to treat is:  Pain.      PROCEDURE:  It is recommended that you be scheduled for the following procedure: Bilateral L4/5 and L5/S1 facet joint intra-articular injections.  Please follow the specific instructions provided by the surgery scheduler for the testing, physician evaluation, pre-operative education, and other preparatory requirements of the procedure to help assure a safe and successful result.  You can contact [facility] at [phone] for any procedure scheduling questions or changes.    Hold MELOXICAM for 4 days prior to procedure and 24 hours following.       TENS unit. You can find a TENS Unit at any major pharmacy or medical supply store. Once you purchase the device, you may submit your receipt and the written prescription to your MEDICAL insurance for possible reimbursement. You may check with your medical insurance prior to purchasing the device for coverage information.  Lidocaine patch/cream may also be purchased over the counter at any local pharmacy.     It is recommended you schedule a follow-up appointment with Joce Xie MD, surgery scheduling will assist you to schedule a follow up appointment within 2-4 weeks of your procedure.    Office hours are 8:00 am to 5:00 pm Monday through Friday. If it is urgent that you speak with someone outside of these hours, our [facility] Call Center will be able to assist you. You can reach the office by calling the [facility] at [phone]    We do highly recommend [facility], if you do not already have this. You can request access via the internet or by simply talking with a  at any of the clinics.   [email or web address].      Thank you for choosing [facility] as your Pain Management provider!     patient

## 2017-05-30 ENCOUNTER — OUTPATIENT (OUTPATIENT)
Dept: OUTPATIENT SERVICES | Facility: HOSPITAL | Age: 57
LOS: 1 days | End: 2017-05-30
Payer: MEDICAID

## 2017-05-30 ENCOUNTER — APPOINTMENT (OUTPATIENT)
Dept: INTERNAL MEDICINE | Facility: CLINIC | Age: 57
End: 2017-05-30

## 2017-05-30 VITALS
DIASTOLIC BLOOD PRESSURE: 70 MMHG | HEART RATE: 71 BPM | WEIGHT: 175 LBS | OXYGEN SATURATION: 96 % | BODY MASS INDEX: 30.51 KG/M2 | SYSTOLIC BLOOD PRESSURE: 118 MMHG

## 2017-05-30 DIAGNOSIS — Z98.89 OTHER SPECIFIED POSTPROCEDURAL STATES: Chronic | ICD-10-CM

## 2017-05-30 DIAGNOSIS — I10 ESSENTIAL (PRIMARY) HYPERTENSION: ICD-10-CM

## 2017-05-30 PROCEDURE — G0463: CPT

## 2017-06-12 DIAGNOSIS — E11.9 TYPE 2 DIABETES MELLITUS WITHOUT COMPLICATIONS: ICD-10-CM

## 2017-06-12 DIAGNOSIS — I25.10 ATHEROSCLEROTIC HEART DISEASE OF NATIVE CORONARY ARTERY WITHOUT ANGINA PECTORIS: ICD-10-CM

## 2017-07-23 PROCEDURE — 80061 LIPID PANEL: CPT

## 2017-07-23 PROCEDURE — 85027 COMPLETE CBC AUTOMATED: CPT

## 2017-07-23 PROCEDURE — 82550 ASSAY OF CK (CPK): CPT

## 2017-07-23 PROCEDURE — 85379 FIBRIN DEGRADATION QUANT: CPT

## 2017-07-23 PROCEDURE — 84100 ASSAY OF PHOSPHORUS: CPT

## 2017-07-23 PROCEDURE — C1874: CPT

## 2017-07-23 PROCEDURE — 84484 ASSAY OF TROPONIN QUANT: CPT

## 2017-07-23 PROCEDURE — 83735 ASSAY OF MAGNESIUM: CPT

## 2017-07-23 PROCEDURE — 99285 EMERGENCY DEPT VISIT HI MDM: CPT | Mod: 25

## 2017-07-23 PROCEDURE — C1769: CPT

## 2017-07-23 PROCEDURE — 83880 ASSAY OF NATRIURETIC PEPTIDE: CPT

## 2017-07-23 PROCEDURE — 80053 COMPREHEN METABOLIC PANEL: CPT

## 2017-07-23 PROCEDURE — 93458 L HRT ARTERY/VENTRICLE ANGIO: CPT | Mod: 59

## 2017-07-23 PROCEDURE — C1887: CPT

## 2017-07-23 PROCEDURE — 83036 HEMOGLOBIN GLYCOSYLATED A1C: CPT

## 2017-07-23 PROCEDURE — 93017 CV STRESS TEST TRACING ONLY: CPT

## 2017-07-23 PROCEDURE — 96372 THER/PROPH/DIAG INJ SC/IM: CPT | Mod: XU

## 2017-07-23 PROCEDURE — C9600: CPT | Mod: RC

## 2017-07-23 PROCEDURE — G0378: CPT

## 2017-07-23 PROCEDURE — C1894: CPT

## 2017-07-23 PROCEDURE — 71046 X-RAY EXAM CHEST 2 VIEWS: CPT

## 2017-07-23 PROCEDURE — 85610 PROTHROMBIN TIME: CPT

## 2017-07-23 PROCEDURE — A9500: CPT

## 2017-07-23 PROCEDURE — 85730 THROMBOPLASTIN TIME PARTIAL: CPT

## 2017-07-23 PROCEDURE — 80048 BASIC METABOLIC PNL TOTAL CA: CPT

## 2017-07-23 PROCEDURE — 82553 CREATINE MB FRACTION: CPT

## 2017-07-23 PROCEDURE — 78452 HT MUSCLE IMAGE SPECT MULT: CPT

## 2017-07-23 PROCEDURE — 94640 AIRWAY INHALATION TREATMENT: CPT

## 2017-07-23 PROCEDURE — 93005 ELECTROCARDIOGRAM TRACING: CPT | Mod: 76

## 2017-07-31 NOTE — DISCHARGE NOTE ADULT - CARE PROVIDER_API CALL
Pt being transferred to HD in stretcher by patient escort. Will continue to monitor.   Chris Guevara), Cardiovascular Disease; Internal Medicine; Nuclear Cardiology  [address] [place] [address]  Phone: [phone]  Fax: [phone] Jeff Harris), Cardiovascular Disease; Interventional Cardiology  [address] [place] [address]  Phone: [phone]  Fax: [phone]

## 2017-09-11 ENCOUNTER — APPOINTMENT (OUTPATIENT)
Dept: CARDIOLOGY | Facility: CLINIC | Age: 57
End: 2017-09-11
Payer: MEDICAID

## 2017-09-11 ENCOUNTER — NON-APPOINTMENT (OUTPATIENT)
Age: 57
End: 2017-09-11

## 2017-09-11 VITALS
BODY MASS INDEX: 30.1 KG/M2 | WEIGHT: 172 LBS | OXYGEN SATURATION: 100 % | SYSTOLIC BLOOD PRESSURE: 126 MMHG | HEART RATE: 80 BPM | HEIGHT: 63.5 IN | DIASTOLIC BLOOD PRESSURE: 82 MMHG

## 2017-09-11 PROCEDURE — 93000 ELECTROCARDIOGRAM COMPLETE: CPT

## 2017-09-11 PROCEDURE — 99215 OFFICE O/P EST HI 40 MIN: CPT

## 2017-09-20 ENCOUNTER — APPOINTMENT (OUTPATIENT)
Dept: INTERNAL MEDICINE | Facility: CLINIC | Age: 57
End: 2017-09-20

## 2017-10-03 ENCOUNTER — APPOINTMENT (OUTPATIENT)
Dept: OPHTHALMOLOGY | Facility: CLINIC | Age: 57
End: 2017-10-03

## 2017-10-10 ENCOUNTER — APPOINTMENT (OUTPATIENT)
Dept: OPHTHALMOLOGY | Facility: CLINIC | Age: 57
End: 2017-10-10
Payer: COMMERCIAL

## 2017-10-10 PROCEDURE — 92012 INTRM OPH EXAM EST PATIENT: CPT

## 2017-10-18 ENCOUNTER — APPOINTMENT (OUTPATIENT)
Dept: INTERNAL MEDICINE | Facility: CLINIC | Age: 57
End: 2017-10-18
Payer: COMMERCIAL

## 2017-10-18 ENCOUNTER — OUTPATIENT (OUTPATIENT)
Dept: OUTPATIENT SERVICES | Facility: HOSPITAL | Age: 57
LOS: 1 days | End: 2017-10-18
Payer: COMMERCIAL

## 2017-10-18 VITALS
OXYGEN SATURATION: 98 % | HEIGHT: 63.5 IN | BODY MASS INDEX: 30.45 KG/M2 | SYSTOLIC BLOOD PRESSURE: 123 MMHG | DIASTOLIC BLOOD PRESSURE: 70 MMHG | WEIGHT: 174 LBS | HEART RATE: 83 BPM

## 2017-10-18 DIAGNOSIS — M72.2 PLANTAR FASCIAL FIBROMATOSIS: ICD-10-CM

## 2017-10-18 DIAGNOSIS — H52.203 UNSPECIFIED ASTIGMATISM, BILATERAL: ICD-10-CM

## 2017-10-18 DIAGNOSIS — I10 ESSENTIAL (PRIMARY) HYPERTENSION: ICD-10-CM

## 2017-10-18 DIAGNOSIS — Z98.89 OTHER SPECIFIED POSTPROCEDURAL STATES: Chronic | ICD-10-CM

## 2017-10-18 DIAGNOSIS — H52.13 MYOPIA, BILATERAL: ICD-10-CM

## 2017-10-18 DIAGNOSIS — Z02.1 ENCOUNTER FOR PRE-EMPLOYMENT EXAMINATION: ICD-10-CM

## 2017-10-18 PROCEDURE — 99213 OFFICE O/P EST LOW 20 MIN: CPT | Mod: GE

## 2017-10-18 PROCEDURE — G0463: CPT

## 2017-10-20 NOTE — PHYSICAL EXAM
[General Appearance - Alert] : alert [General Appearance - In No Acute Distress] : in no acute distress [General Appearance - Well-Appearing] : healthy appearing [Sclera] : the sclera and conjunctiva were normal [PERRL With Normal Accommodation] : pupils were equal in size, round, and reactive to light [Outer Ear] : the ears and nose were normal in appearance [Oropharynx] : the oropharynx was normal [Neck Appearance] : the appearance of the neck was normal [Auscultation Breath Sounds / Voice Sounds] : lungs were clear to auscultation bilaterally [Heart Rate And Rhythm] : heart rate was normal and rhythm regular [Murmurs] : no murmurs [Abdomen Soft] : soft [Abdomen Tenderness] : non-tender [No CVA Tenderness] : no ~M costovertebral angle tenderness [No Spinal Tenderness] : no spinal tenderness [Abnormal Walk] : normal gait [Motor Tone] : muscle strength and tone were normal [] : no rash [Skin Lesions] : no skin lesions [Cranial Nerves] : cranial nerves 2-12 were intact [Sensation] : the sensory exam was normal to light touch and pinprick [Motor Exam] : the motor exam was normal [No Focal Deficits] : no focal deficits [Oriented To Time, Place, And Person] : oriented to person, place, and time [Affect] : the affect was normal

## 2017-10-22 PROBLEM — Z02.1 PRE-EMPLOYMENT HEALTH SCREENING EXAMINATION: Status: RESOLVED | Noted: 2017-01-24 | Resolved: 2017-10-22

## 2017-10-22 NOTE — ASSESSMENT
[FreeTextEntry1] : 56-year-old female with a history of DM II and CAD who presents to clinic for follow-up.\par \par #DM II\par -A1c improved to 7.6%\par -will continue with current insulin regimen for now - Toujeo 15 units BID + humalog 15 u pre-meal\par -continue metformin\par \par #CAD\par -asymptomatic\par -continue DAPT, atorvastatin, metoprolol, lisinopril\par \par #HCM\par -flu shot today

## 2017-10-22 NOTE — HISTORY OF PRESENT ILLNESS
[FreeTextEntry1] : Ms. Winchester is a 56-year-old female with a history of CAD (s/p RCA stent), DM II, asthma who presents to clinic for a follow-up.\par \par The patient recently returned from her trip to [place]. The patient has had no recent issues with her insulin regimen. She has not been checking her blood glucose levels but she is consistent with taking her insulin twice daily. She has been staying active taking care of her kids and grandkids. No chest pain, shortness of breath or nausea/vomiting.\par \par

## 2017-10-22 NOTE — REVIEW OF SYSTEMS
[Negative] : Heme/Lymph [Fever] : no fever [Chills] : no chills [Feeling Poorly] : not feeling poorly [Feeling Tired] : not feeling tired [Eye Pain] : no eye pain [Eyesight Problems] : no eyesight problems [Discharge From Eyes] : no purulent discharge from the eyes [Nosebleeds] : no nosebleeds [Sore Throat] : no sore throat [Chest Pain] : no chest pain [Palpitations] : no palpitations [Lower Ext Edema] : no lower extremity edema [Shortness Of Breath] : no shortness of breath [Wheezing] : no wheezing [Cough] : no cough [SOB on Exertion] : no shortness of breath during exertion [Abdominal Pain] : no abdominal pain [Vomiting] : no vomiting [Constipation] : no constipation [Diarrhea] : no diarrhea [Pelvic Pain] : no pelvic pain [Joint Pain] : no joint pain [Joint Swelling] : no joint swelling [Joint Stiffness] : no joint stiffness [Skin Lesions] : no skin lesions [Confused] : no confusion [Convulsions] : no convulsions [Dizziness] : no dizziness [Fainting] : no fainting [Anxiety] : no anxiety [Depression] : no depression

## 2017-10-23 DIAGNOSIS — E11.9 TYPE 2 DIABETES MELLITUS WITHOUT COMPLICATIONS: ICD-10-CM

## 2017-10-23 DIAGNOSIS — I25.10 ATHEROSCLEROTIC HEART DISEASE OF NATIVE CORONARY ARTERY WITHOUT ANGINA PECTORIS: ICD-10-CM

## 2017-10-23 DIAGNOSIS — E66.9 OBESITY, UNSPECIFIED: ICD-10-CM

## 2017-11-22 LAB — HBA1C MFR BLD HPLC: 7.6

## 2017-12-27 ENCOUNTER — OUTPATIENT (OUTPATIENT)
Dept: OUTPATIENT SERVICES | Facility: HOSPITAL | Age: 57
LOS: 1 days | End: 2017-12-27
Payer: COMMERCIAL

## 2017-12-27 ENCOUNTER — APPOINTMENT (OUTPATIENT)
Dept: INTERNAL MEDICINE | Facility: CLINIC | Age: 57
End: 2017-12-27
Payer: COMMERCIAL

## 2017-12-27 VITALS
WEIGHT: 177 LBS | DIASTOLIC BLOOD PRESSURE: 70 MMHG | BODY MASS INDEX: 30.97 KG/M2 | HEART RATE: 80 BPM | SYSTOLIC BLOOD PRESSURE: 112 MMHG | HEIGHT: 63.5 IN | OXYGEN SATURATION: 97 %

## 2017-12-27 DIAGNOSIS — Z98.89 OTHER SPECIFIED POSTPROCEDURAL STATES: Chronic | ICD-10-CM

## 2017-12-27 DIAGNOSIS — I10 ESSENTIAL (PRIMARY) HYPERTENSION: ICD-10-CM

## 2017-12-27 PROCEDURE — 99213 OFFICE O/P EST LOW 20 MIN: CPT | Mod: GE

## 2017-12-29 DIAGNOSIS — R23.2 FLUSHING: ICD-10-CM

## 2017-12-29 DIAGNOSIS — E11.9 TYPE 2 DIABETES MELLITUS WITHOUT COMPLICATIONS: ICD-10-CM

## 2018-01-17 ENCOUNTER — APPOINTMENT (OUTPATIENT)
Dept: PULMONOLOGY | Facility: CLINIC | Age: 58
End: 2018-01-17
Payer: COMMERCIAL

## 2018-01-17 ENCOUNTER — FORM ENCOUNTER (OUTPATIENT)
Age: 58
End: 2018-01-17

## 2018-01-17 VITALS
SYSTOLIC BLOOD PRESSURE: 124 MMHG | DIASTOLIC BLOOD PRESSURE: 60 MMHG | RESPIRATION RATE: 15 BRPM | HEART RATE: 83 BPM | BODY MASS INDEX: 31.01 KG/M2 | OXYGEN SATURATION: 99 % | TEMPERATURE: 98 F | WEIGHT: 175 LBS | HEIGHT: 63 IN

## 2018-01-17 PROCEDURE — 99203 OFFICE O/P NEW LOW 30 MIN: CPT | Mod: 25

## 2018-01-17 PROCEDURE — 94726 PLETHYSMOGRAPHY LUNG VOLUMES: CPT

## 2018-01-17 PROCEDURE — 94729 DIFFUSING CAPACITY: CPT

## 2018-01-17 PROCEDURE — ZZZZZ: CPT

## 2018-01-17 PROCEDURE — 94060 EVALUATION OF WHEEZING: CPT

## 2018-01-18 ENCOUNTER — APPOINTMENT (OUTPATIENT)
Dept: MAMMOGRAPHY | Facility: IMAGING CENTER | Age: 58
End: 2018-01-18
Payer: COMMERCIAL

## 2018-01-18 ENCOUNTER — OUTPATIENT (OUTPATIENT)
Dept: OUTPATIENT SERVICES | Facility: HOSPITAL | Age: 58
LOS: 1 days | End: 2018-01-18
Payer: COMMERCIAL

## 2018-01-18 DIAGNOSIS — Z98.89 OTHER SPECIFIED POSTPROCEDURAL STATES: Chronic | ICD-10-CM

## 2018-01-18 DIAGNOSIS — Z00.8 ENCOUNTER FOR OTHER GENERAL EXAMINATION: ICD-10-CM

## 2018-01-18 PROCEDURE — 77063 BREAST TOMOSYNTHESIS BI: CPT

## 2018-01-18 PROCEDURE — 77067 SCR MAMMO BI INCL CAD: CPT

## 2018-01-18 PROCEDURE — 77067 SCR MAMMO BI INCL CAD: CPT | Mod: 26

## 2018-01-18 PROCEDURE — 77063 BREAST TOMOSYNTHESIS BI: CPT | Mod: 26

## 2018-01-19 ENCOUNTER — LABORATORY RESULT (OUTPATIENT)
Age: 58
End: 2018-01-19

## 2018-01-19 PROCEDURE — 86480 TB TEST CELL IMMUN MEASURE: CPT

## 2018-01-19 PROCEDURE — G0463: CPT

## 2018-01-23 LAB
M TB TUBERC IFN-G BLD QL: 0 IU/ML — SIGNIFICANT CHANGE UP
M TB TUBERC IFN-G BLD QL: 0.15 IU/ML — SIGNIFICANT CHANGE UP
M TB TUBERC IFN-G BLD QL: NEGATIVE — SIGNIFICANT CHANGE UP
MITOGEN IGNF BCKGRD COR BLD-ACNC: 8.86 IU/ML — SIGNIFICANT CHANGE UP

## 2018-01-30 ENCOUNTER — RX RENEWAL (OUTPATIENT)
Age: 58
End: 2018-01-30

## 2018-01-30 ENCOUNTER — MEDICATION RENEWAL (OUTPATIENT)
Age: 58
End: 2018-01-30

## 2018-02-06 ENCOUNTER — APPOINTMENT (OUTPATIENT)
Dept: OPHTHALMOLOGY | Facility: CLINIC | Age: 58
End: 2018-02-06
Payer: COMMERCIAL

## 2018-02-06 PROCEDURE — 92014 COMPRE OPH EXAM EST PT 1/>: CPT

## 2018-02-06 PROCEDURE — 92134 CPTRZ OPH DX IMG PST SGM RTA: CPT

## 2018-03-12 ENCOUNTER — NON-APPOINTMENT (OUTPATIENT)
Age: 58
End: 2018-03-12

## 2018-03-12 ENCOUNTER — APPOINTMENT (OUTPATIENT)
Dept: CARDIOLOGY | Facility: CLINIC | Age: 58
End: 2018-03-12
Payer: COMMERCIAL

## 2018-03-12 VITALS
HEART RATE: 70 BPM | BODY MASS INDEX: 31.36 KG/M2 | OXYGEN SATURATION: 99 % | SYSTOLIC BLOOD PRESSURE: 129 MMHG | HEIGHT: 63 IN | WEIGHT: 177 LBS | DIASTOLIC BLOOD PRESSURE: 82 MMHG

## 2018-03-12 PROCEDURE — 99215 OFFICE O/P EST HI 40 MIN: CPT

## 2018-03-12 PROCEDURE — 93000 ELECTROCARDIOGRAM COMPLETE: CPT

## 2018-04-12 ENCOUNTER — LABORATORY RESULT (OUTPATIENT)
Age: 58
End: 2018-04-12

## 2018-04-12 ENCOUNTER — APPOINTMENT (OUTPATIENT)
Dept: INTERNAL MEDICINE | Facility: CLINIC | Age: 58
End: 2018-04-12
Payer: COMMERCIAL

## 2018-04-12 ENCOUNTER — OUTPATIENT (OUTPATIENT)
Dept: OUTPATIENT SERVICES | Facility: HOSPITAL | Age: 58
LOS: 1 days | End: 2018-04-12
Payer: COMMERCIAL

## 2018-04-12 VITALS
DIASTOLIC BLOOD PRESSURE: 70 MMHG | HEIGHT: 63 IN | SYSTOLIC BLOOD PRESSURE: 117 MMHG | BODY MASS INDEX: 31.01 KG/M2 | WEIGHT: 175 LBS

## 2018-04-12 DIAGNOSIS — I10 ESSENTIAL (PRIMARY) HYPERTENSION: ICD-10-CM

## 2018-04-12 DIAGNOSIS — Z98.89 OTHER SPECIFIED POSTPROCEDURAL STATES: Chronic | ICD-10-CM

## 2018-04-12 LAB
HBA1C MFR BLD HPLC: 8.1
HCT VFR BLD CALC: 37.4 % — SIGNIFICANT CHANGE UP (ref 34.5–45)
HGB BLD-MCNC: 12 G/DL — SIGNIFICANT CHANGE UP (ref 11.5–15.5)
MCHC RBC-ENTMCNC: 28.5 PG — SIGNIFICANT CHANGE UP (ref 27–34)
MCHC RBC-ENTMCNC: 32.1 GM/DL — SIGNIFICANT CHANGE UP (ref 32–36)
MCV RBC AUTO: 88.8 FL — SIGNIFICANT CHANGE UP (ref 80–100)
PLATELET # BLD AUTO: 284 K/UL — SIGNIFICANT CHANGE UP (ref 150–400)
RBC # BLD: 4.21 M/UL — SIGNIFICANT CHANGE UP (ref 3.8–5.2)
RBC # FLD: 12.9 % — SIGNIFICANT CHANGE UP (ref 10.3–14.5)
WBC # BLD: 9.36 K/UL — SIGNIFICANT CHANGE UP (ref 3.8–10.5)
WBC # FLD AUTO: 9.36 K/UL — SIGNIFICANT CHANGE UP (ref 3.8–10.5)

## 2018-04-12 PROCEDURE — G0463: CPT

## 2018-04-12 PROCEDURE — 83036 HEMOGLOBIN GLYCOSYLATED A1C: CPT

## 2018-04-12 PROCEDURE — 86803 HEPATITIS C AB TEST: CPT

## 2018-04-12 PROCEDURE — 86706 HEP B SURFACE ANTIBODY: CPT

## 2018-04-12 PROCEDURE — 80053 COMPREHEN METABOLIC PANEL: CPT

## 2018-04-12 PROCEDURE — 80061 LIPID PANEL: CPT

## 2018-04-12 PROCEDURE — 85027 COMPLETE CBC AUTOMATED: CPT

## 2018-04-12 PROCEDURE — 99213 OFFICE O/P EST LOW 20 MIN: CPT | Mod: GE

## 2018-04-12 PROCEDURE — 86705 HEP B CORE ANTIBODY IGM: CPT

## 2018-04-13 LAB
ALBUMIN SERPL ELPH-MCNC: 4.8 G/DL — SIGNIFICANT CHANGE UP (ref 3.3–5)
ALP SERPL-CCNC: 75 U/L — SIGNIFICANT CHANGE UP (ref 40–120)
ALT FLD-CCNC: 15 U/L — SIGNIFICANT CHANGE UP (ref 10–45)
ANION GAP SERPL CALC-SCNC: 16 MMOL/L — SIGNIFICANT CHANGE UP (ref 5–17)
AST SERPL-CCNC: 16 U/L — SIGNIFICANT CHANGE UP (ref 10–40)
BILIRUB SERPL-MCNC: 0.3 MG/DL — SIGNIFICANT CHANGE UP (ref 0.2–1.2)
BUN SERPL-MCNC: 18 MG/DL — SIGNIFICANT CHANGE UP (ref 7–23)
CALCIUM SERPL-MCNC: 10.7 MG/DL — HIGH (ref 8.4–10.5)
CHLORIDE SERPL-SCNC: 98 MMOL/L — SIGNIFICANT CHANGE UP (ref 96–108)
CHOLEST SERPL-MCNC: 163 MG/DL — SIGNIFICANT CHANGE UP (ref 10–199)
CO2 SERPL-SCNC: 26 MMOL/L — SIGNIFICANT CHANGE UP (ref 22–31)
CREAT SERPL-MCNC: 0.92 MG/DL — SIGNIFICANT CHANGE UP (ref 0.5–1.3)
GLUCOSE SERPL-MCNC: 108 MG/DL — HIGH (ref 70–99)
HBV CORE IGM SER-ACNC: SIGNIFICANT CHANGE UP
HBV SURFACE AB SER-ACNC: SIGNIFICANT CHANGE UP
HCV AB S/CO SERPL IA: 0.19 S/CO — SIGNIFICANT CHANGE UP
HCV AB SERPL-IMP: SIGNIFICANT CHANGE UP
HDLC SERPL-MCNC: 70 MG/DL — SIGNIFICANT CHANGE UP (ref 40–125)
LIPID PNL WITH DIRECT LDL SERPL: 43 MG/DL — SIGNIFICANT CHANGE UP
POTASSIUM SERPL-MCNC: 5.1 MMOL/L — SIGNIFICANT CHANGE UP (ref 3.5–5.3)
POTASSIUM SERPL-SCNC: 5.1 MMOL/L — SIGNIFICANT CHANGE UP (ref 3.5–5.3)
PROT SERPL-MCNC: 7.8 G/DL — SIGNIFICANT CHANGE UP (ref 6–8.3)
SODIUM SERPL-SCNC: 140 MMOL/L — SIGNIFICANT CHANGE UP (ref 135–145)
TOTAL CHOLESTEROL/HDL RATIO MEASUREMENT: 2.3 RATIO — LOW (ref 3.3–7.1)
TRIGL SERPL-MCNC: 251 MG/DL — HIGH (ref 10–149)

## 2018-04-19 DIAGNOSIS — I25.10 ATHEROSCLEROTIC HEART DISEASE OF NATIVE CORONARY ARTERY WITHOUT ANGINA PECTORIS: ICD-10-CM

## 2018-04-19 DIAGNOSIS — E11.9 TYPE 2 DIABETES MELLITUS WITHOUT COMPLICATIONS: ICD-10-CM

## 2018-04-24 ENCOUNTER — APPOINTMENT (OUTPATIENT)
Dept: OPHTHALMOLOGY | Facility: CLINIC | Age: 58
End: 2018-04-24
Payer: COMMERCIAL

## 2018-04-24 PROCEDURE — 92014 COMPRE OPH EXAM EST PT 1/>: CPT

## 2018-06-04 ENCOUNTER — RX RENEWAL (OUTPATIENT)
Age: 58
End: 2018-06-04

## 2018-06-07 ENCOUNTER — MEDICATION RENEWAL (OUTPATIENT)
Age: 58
End: 2018-06-07

## 2018-06-11 ENCOUNTER — APPOINTMENT (OUTPATIENT)
Dept: INTERNAL MEDICINE | Facility: CLINIC | Age: 58
End: 2018-06-11

## 2018-06-15 ENCOUNTER — LABORATORY RESULT (OUTPATIENT)
Age: 58
End: 2018-06-15

## 2018-06-18 ENCOUNTER — RX RENEWAL (OUTPATIENT)
Age: 58
End: 2018-06-18

## 2018-06-21 ENCOUNTER — APPOINTMENT (OUTPATIENT)
Dept: INTERNAL MEDICINE | Facility: CLINIC | Age: 58
End: 2018-06-21

## 2018-06-22 ENCOUNTER — MEDICATION RENEWAL (OUTPATIENT)
Age: 58
End: 2018-06-22

## 2018-08-01 ENCOUNTER — OUTPATIENT (OUTPATIENT)
Dept: OUTPATIENT SERVICES | Facility: HOSPITAL | Age: 58
LOS: 1 days | End: 2018-08-01
Payer: COMMERCIAL

## 2018-08-01 ENCOUNTER — APPOINTMENT (OUTPATIENT)
Dept: INTERNAL MEDICINE | Facility: CLINIC | Age: 58
End: 2018-08-01
Payer: COMMERCIAL

## 2018-08-01 VITALS — HEIGHT: 63 IN | SYSTOLIC BLOOD PRESSURE: 106 MMHG | DIASTOLIC BLOOD PRESSURE: 70 MMHG

## 2018-08-01 DIAGNOSIS — Z98.89 OTHER SPECIFIED POSTPROCEDURAL STATES: Chronic | ICD-10-CM

## 2018-08-01 DIAGNOSIS — I10 ESSENTIAL (PRIMARY) HYPERTENSION: ICD-10-CM

## 2018-08-01 DIAGNOSIS — I25.10 ATHEROSCLEROTIC HEART DISEASE OF NATIVE CORONARY ARTERY WITHOUT ANGINA PECTORIS: ICD-10-CM

## 2018-08-01 PROCEDURE — 99213 OFFICE O/P EST LOW 20 MIN: CPT | Mod: GE

## 2018-08-01 PROCEDURE — G0463: CPT

## 2018-08-02 RX ORDER — BLOOD-GLUCOSE METER
KIT MISCELLANEOUS
Qty: 1 | Refills: 2 | Status: DISCONTINUED | COMMUNITY
Start: 2018-01-31 | End: 2018-08-02

## 2018-08-02 RX ORDER — ASPIRIN 81 MG/1
81 TABLET, CHEWABLE ORAL DAILY
Qty: 1 | Refills: 3 | Status: DISCONTINUED | COMMUNITY
Start: 2017-01-10 | End: 2018-08-02

## 2018-08-06 ENCOUNTER — RX RENEWAL (OUTPATIENT)
Age: 58
End: 2018-08-06

## 2018-08-14 NOTE — HISTORY OF PRESENT ILLNESS
[Friend] : friend [FreeTextEntry1] : 57F presenting for f/u [de-identified] : 57F w/ uncontrolled DM2, CAD s/p RCA stent on DAPT, HTN, pulmonary sarcoid w/ resolved hypercalcemia presents to clinic for f/u. Patient reports that she tales medications as Rx however did not start losartan in place of lisinopril which was Rx in June by [facility]. Unable to find documentation for change however patient reports that she has had intermittent dry cough for some time. She reports taking FS sometimes in am and notes that it is usually 150's. She ate potato salad for breakfast day prior, fried plantains for lunch and eats large amount of cherries and corn w/o drinking juices. She blames poor blood sugars on tension she has w/ ex- as well as tension she has with her current . She plans on seeing Optho this month for eye check.

## 2018-08-14 NOTE — PLAN
[FreeTextEntry1] : 57F w/ uncontrolled DM2, CAD s/p RCA stent on DAPT, HTN, pulmonary sarcoid w/ resolved hypercalcemia presents to clinic for f/u. \par \par #DM2\par - POCT-HbA1c 8.7, from 8.1 and worsening. instructed patient to keep FS diary for 2 week awakening, pre-meal and qhs.\par - for now c/w metformin 1g BID, humalog 15U premeal, Toujeo 15U BID\par - instructed to c/w lipitor 20d and advised stop lisinopril 10 and start losartan 100d per last Rx\par - advised to decrease carb load and eat more green vegetable and beans w/ lean meats. instructed to increase exercise regimen.\par - patient reports plan to see optho\par #CAD w/ RCA stent\par - c/w asa/clopidogrel, metoprolol 50mg in am 25mg bedtime, and losartan 100d\par - patient reports plan to follow w/ cards in Sept. no CP or SOB reported\par #Pulmonary Sarcoid\par - PFT annually, pulm note appreciated proventil prn. hypercalcemia resolved June2018 labs\par \par HCM\par FIT test given\par Mammo: BIRDAS-2 Jan2018\par \par d/w Dr. Reed. RTC in 2weeks for FS diary check and may need insulin titration. also bp check w/ change from lisinopril to losartan

## 2018-08-14 NOTE — REVIEW OF SYSTEMS
[Fever] : no fever [Night Sweats] : no night sweats [Discharge] : no discharge [Pain] : no pain [Earache] : no earache [Hearing Loss] : no hearing loss [Chest Pain] : no chest pain [Palpitations] : no palpitations [Shortness Of Breath] : no shortness of breath [Cough] : no cough [Diarrhea] : diarrhea [Vomiting] : no vomiting

## 2018-08-14 NOTE — PHYSICAL EXAM
[No Acute Distress] : no acute distress [Normal Sclera/Conjunctiva] : normal sclera/conjunctiva [PERRL] : pupils equal round and reactive to light [EOMI] : extraocular movements intact [Normal Outer Ear/Nose] : the outer ears and nose were normal in appearance [Normal Oropharynx] : the oropharynx was normal [No JVD] : no jugular venous distention [Supple] : supple [Thyroid Normal, No Nodules] : the thyroid was normal and there were no nodules present [No Respiratory Distress] : no respiratory distress  [Clear to Auscultation] : lungs were clear to auscultation bilaterally [Normal Rate] : normal rate  [Regular Rhythm] : with a regular rhythm [Normal S1, S2] : normal S1 and S2 [No Murmur] : no murmur heard [Pedal Pulses Present] : the pedal pulses are present [No Edema] : there was no peripheral edema [No Joint Swelling] : no joint swelling [No Rash] : no rash [Normal Gait] : normal gait [No Focal Deficits] : no focal deficits [de-identified] : obese

## 2018-08-29 ENCOUNTER — LABORATORY RESULT (OUTPATIENT)
Age: 58
End: 2018-08-29

## 2018-08-29 ENCOUNTER — OUTPATIENT (OUTPATIENT)
Dept: OUTPATIENT SERVICES | Facility: HOSPITAL | Age: 58
LOS: 1 days | End: 2018-08-29
Payer: COMMERCIAL

## 2018-08-29 ENCOUNTER — APPOINTMENT (OUTPATIENT)
Dept: INTERNAL MEDICINE | Facility: CLINIC | Age: 58
End: 2018-08-29
Payer: COMMERCIAL

## 2018-08-29 VITALS
DIASTOLIC BLOOD PRESSURE: 70 MMHG | WEIGHT: 185 LBS | SYSTOLIC BLOOD PRESSURE: 110 MMHG | HEIGHT: 63 IN | BODY MASS INDEX: 32.78 KG/M2

## 2018-08-29 DIAGNOSIS — Z98.89 OTHER SPECIFIED POSTPROCEDURAL STATES: Chronic | ICD-10-CM

## 2018-08-29 DIAGNOSIS — I10 ESSENTIAL (PRIMARY) HYPERTENSION: ICD-10-CM

## 2018-08-29 LAB — HBA1C MFR BLD HPLC: 8.9

## 2018-08-29 PROCEDURE — G0463: CPT

## 2018-08-29 PROCEDURE — 99213 OFFICE O/P EST LOW 20 MIN: CPT | Mod: GE

## 2018-08-29 PROCEDURE — 82274 ASSAY TEST FOR BLOOD FECAL: CPT

## 2018-08-29 RX ORDER — LOSARTAN POTASSIUM 100 MG/1
100 TABLET, FILM COATED ORAL DAILY
Qty: 90 | Refills: 3 | Status: DISCONTINUED | COMMUNITY
Start: 2018-03-12 | End: 2018-08-29

## 2018-08-29 NOTE — HISTORY OF PRESENT ILLNESS
[de-identified] : Patient is a 56 yo F w/PMH of poorly controlled T2DM (A1c 8.1% in 4/2018), CAD s/p RCA stent on DAPT, HTN, pulmonary sarcoid presenting for BP check and DM follow up.\par \par For her DM, she currently takes Toujeo 15QAM/15QPM, Humalog 15U before breakfast, 15U before dinner and Metformin 1000mg BID. She is requesting Toujeo pen needles as she has run out. Has recorded only fasting BS since last visit and have been ranging widely - from 97 to 255 (although the 255 was a single value, after having had a snack at night), generally 100s. She has not been checking her FSBG other times of the day.\par \par She reports that she recently had what she suspects was a hypoglycemic episode with diaphoresis for which she ate 2 cookies and had a glass of milk.\par \par Has DM eye exam scheduled for 9/6.\par \par She reports that she has not switched from Lisinopril to Losartan as she never got the prescription. She denies issues with the Lisinopril, no cough associated with it, unknown why it was switched. Of note, it was never actually ordered by anyone in the system, and was simply recorded.\par \par POCT A1c 8.9% today.

## 2018-08-29 NOTE — REVIEW OF SYSTEMS
[Fever] : no fever [Chills] : no chills [Fatigue] : no fatigue [Night Sweats] : no night sweats [Discharge] : no discharge [Pain] : no pain [Vision Problems] : no vision problems [Itching] : no itching [Hearing Loss] : no hearing loss [Nasal Discharge] : no nasal discharge [Sore Throat] : no sore throat [Chest Pain] : no chest pain [Palpitations] : no palpitations [Orthopnea] : no orthopnea [Shortness Of Breath] : no shortness of breath [Wheezing] : no wheezing [Cough] : no cough [Abdominal Pain] : no abdominal pain [Nausea] : no nausea [Constipation] : no constipation [Vomiting] : no vomiting [Melena] : no melena [Dysuria] : no dysuria [Joint Pain] : no joint pain [Muscle Pain] : no muscle pain [Skin Rash] : no skin rash [Anxiety] : no anxiety [Depression] : no depression

## 2018-08-29 NOTE — ASSESSMENT
[FreeTextEntry1] : 56 yo F w/PMH of poorly controlled T2DM (A1c 8.1% in 4/2018), CAD s/p RCA stent on DAPT, HTN, pulmonary sarcoid presenting for BP check and DM follow up\par \par 1. HTN\par - controlled on current regimen, c/w current meds\par - Lisinopril renewed as patient was never actually prescribed Losartan (never actually sent to a pharmacy in [facility], had been recorded on multiple occasions) and no clear indication for discontinuation of Lisinopril\par \par 2. T2DM\par - POCT A1c 8/9% today\par - extensive reeducation provided regarding her insulin use - patient was not aware that the Humalog was the short-acting insulin\par - Discussed treatment of hypoglycemia with juice instead of milk as it will be absorbed faster\par - will continue with current insulin regimen with patient agreeing to always ensure she eats within 15 minutes of administration of Humalog\par - patient agreeable to check sugars post-prandially and document as well as fasting and will write down her meals\par - RTC 2 weeks for follow up and insulin regimen adjustment\par - DM educator (Malka) and nutrition referrals provided\par \par dw Dr. Schroeder\par \par Liat Wheeler, R3

## 2018-08-29 NOTE — PHYSICAL EXAM
[No Acute Distress] : no acute distress [Well Nourished] : well nourished [Well Developed] : well developed [Normal Sclera/Conjunctiva] : normal sclera/conjunctiva [PERRL] : pupils equal round and reactive to light [EOMI] : extraocular movements intact [Normal Outer Ear/Nose] : the outer ears and nose were normal in appearance [Supple] : supple [No Respiratory Distress] : no respiratory distress  [Clear to Auscultation] : lungs were clear to auscultation bilaterally [No Accessory Muscle Use] : no accessory muscle use [Normal Rate] : normal rate  [Regular Rhythm] : with a regular rhythm [Normal S1, S2] : normal S1 and S2 [No Edema] : there was no peripheral edema [Soft] : abdomen soft [Non Tender] : non-tender [Non-distended] : non-distended [No Rash] : no rash [Normal Affect] : the affect was normal [None] : no ulcers in either foot were found [] : both feet

## 2018-09-05 LAB — OB PNL STL IA: NEGATIVE — SIGNIFICANT CHANGE UP

## 2018-09-06 ENCOUNTER — APPOINTMENT (OUTPATIENT)
Dept: OPHTHALMOLOGY | Facility: CLINIC | Age: 58
End: 2018-09-06
Payer: COMMERCIAL

## 2018-09-06 PROCEDURE — 92012 INTRM OPH EXAM EST PATIENT: CPT

## 2018-09-12 DIAGNOSIS — E11.9 TYPE 2 DIABETES MELLITUS WITHOUT COMPLICATIONS: ICD-10-CM

## 2018-09-13 ENCOUNTER — APPOINTMENT (OUTPATIENT)
Dept: INTERNAL MEDICINE | Facility: CLINIC | Age: 58
End: 2018-09-13

## 2018-09-17 ENCOUNTER — APPOINTMENT (OUTPATIENT)
Dept: CARDIOLOGY | Facility: CLINIC | Age: 58
End: 2018-09-17
Payer: COMMERCIAL

## 2018-09-17 ENCOUNTER — NON-APPOINTMENT (OUTPATIENT)
Age: 58
End: 2018-09-17

## 2018-09-17 VITALS
WEIGHT: 183 LBS | DIASTOLIC BLOOD PRESSURE: 77 MMHG | SYSTOLIC BLOOD PRESSURE: 116 MMHG | BODY MASS INDEX: 32.43 KG/M2 | OXYGEN SATURATION: 99 % | HEART RATE: 67 BPM | HEIGHT: 63 IN

## 2018-09-17 PROCEDURE — 99215 OFFICE O/P EST HI 40 MIN: CPT

## 2018-09-17 PROCEDURE — 93000 ELECTROCARDIOGRAM COMPLETE: CPT

## 2018-09-17 RX ORDER — CLOPIDOGREL BISULFATE 75 MG/1
75 TABLET, FILM COATED ORAL DAILY
Qty: 1 | Refills: 3 | Status: DISCONTINUED | COMMUNITY
Start: 2017-03-16 | End: 2018-09-17

## 2018-09-24 ENCOUNTER — APPOINTMENT (OUTPATIENT)
Dept: INTERNAL MEDICINE | Facility: CLINIC | Age: 58
End: 2018-09-24

## 2018-10-09 ENCOUNTER — TRANSCRIPTION ENCOUNTER (OUTPATIENT)
Age: 58
End: 2018-10-09

## 2018-10-09 ENCOUNTER — MEDICATION RENEWAL (OUTPATIENT)
Age: 58
End: 2018-10-09

## 2018-10-19 ENCOUNTER — APPOINTMENT (OUTPATIENT)
Dept: INTERNAL MEDICINE | Facility: CLINIC | Age: 58
End: 2018-10-19
Payer: COMMERCIAL

## 2018-10-19 ENCOUNTER — OUTPATIENT (OUTPATIENT)
Dept: OUTPATIENT SERVICES | Facility: HOSPITAL | Age: 58
LOS: 1 days | End: 2018-10-19
Payer: COMMERCIAL

## 2018-10-19 VITALS
BODY MASS INDEX: 32.6 KG/M2 | WEIGHT: 184 LBS | DIASTOLIC BLOOD PRESSURE: 70 MMHG | SYSTOLIC BLOOD PRESSURE: 122 MMHG | HEIGHT: 63 IN

## 2018-10-19 DIAGNOSIS — I10 ESSENTIAL (PRIMARY) HYPERTENSION: ICD-10-CM

## 2018-10-19 DIAGNOSIS — Z98.89 OTHER SPECIFIED POSTPROCEDURAL STATES: Chronic | ICD-10-CM

## 2018-10-19 PROCEDURE — G0463: CPT

## 2018-10-19 PROCEDURE — 99214 OFFICE O/P EST MOD 30 MIN: CPT | Mod: GC

## 2018-10-19 NOTE — PHYSICAL EXAM
[No Acute Distress] : no acute distress [Well-Appearing] : well-appearing [Normal Sclera/Conjunctiva] : normal sclera/conjunctiva [PERRL] : pupils equal round and reactive to light [EOMI] : extraocular movements intact [Normal Oropharynx] : the oropharynx was normal [Supple] : supple [Clear to Auscultation] : lungs were clear to auscultation bilaterally [Normal S1, S2] : normal S1 and S2 [No Murmur] : no murmur heard [No Edema] : there was no peripheral edema [Soft] : abdomen soft [Non Tender] : non-tender [No HSM] : no HSM [Normal Bowel Sounds] : normal bowel sounds [No CVA Tenderness] : no CVA  tenderness [No Spinal Tenderness] : no spinal tenderness [Speech Grossly Normal] : speech grossly normal

## 2018-10-21 NOTE — PLAN
[FreeTextEntry1] : 1. HTN\par - controlled on current regimen, c/w current meds\par - Lisinopril renewed \par \par 2. T2DM\par - POCT A1c 8.9% last visit \par - extensive reeducation provided regarding her insulin use - pt still using lantus to adjust per meals \par - Discussed treatment of hypoglycemia with juice \par - will continue with current insulin regimen with patient agreeing to always ensure she eats within 15 minutes of administration of Humalog\par - patient agreeable to bringing logs next appt \par \par 3. URI\par - Symptoms improving. Provided counseling on supportive treatment including tylenol use. If eye pain gets worse, pt to return to ophtho \par \par Flu vaccine next visit as pt not quite feeling well yet \par RTC in 6 wks for repeat A1C \par \par alexis Weber

## 2018-10-21 NOTE — HISTORY OF PRESENT ILLNESS
[de-identified] : 56 yo F w pmhx of T2DM (A1C 8.9), CAD s/p RCA stent, HTN, pulmonary sarcoid presenting for a follow up. \par \par Patient additionally notes that last week she had cough and nasal congestion last week. She saw a doctor in urgent care who gave her a 5day course of antibiotics (she's not sure which one). She feels better now but still has some cough especially at night. She does not get enough sleep as a result and is sleepy during the day. She also complains of eye pain bilaterally without any vision changes that resolves with tylenol. Pt had an ophtho exam recently. \par \par In terms of DM, pt is eating vegetables and fruits and not so much meat. She injects 15-20u lantus bid and 15/15/15 humalog. She occasionally eats sweets when she increases the lantus dose. She forgot to bring her FS logs \par \par

## 2018-10-21 NOTE — REVIEW OF SYSTEMS
[Fatigue] : fatigue [Pain] : pain [Fever] : no fever [Chills] : no chills [Night Sweats] : no night sweats [Vision Problems] : no vision problems [Nasal Discharge] : no nasal discharge [Sore Throat] : no sore throat [Chest Pain] : no chest pain [Shortness Of Breath] : no shortness of breath [Abdominal Pain] : no abdominal pain [Nausea] : no nausea [Vomiting] : no vomiting [Dysuria] : no dysuria [Joint Pain] : no joint pain [Headache] : no headache [Dizziness] : no dizziness

## 2018-10-21 NOTE — ASSESSMENT
[FreeTextEntry1] : 56 yo F w/PMH of poorly controlled T2DM (A1c 8.1% in 4/2018), CAD s/p RCA stent on DAPT, HTN, pulmonary sarcoid presenting for BP check and DM follow up

## 2018-10-25 DIAGNOSIS — J06.9 ACUTE UPPER RESPIRATORY INFECTION, UNSPECIFIED: ICD-10-CM

## 2018-10-25 DIAGNOSIS — E11.9 TYPE 2 DIABETES MELLITUS WITHOUT COMPLICATIONS: ICD-10-CM

## 2018-11-05 ENCOUNTER — RX RENEWAL (OUTPATIENT)
Age: 58
End: 2018-11-05

## 2018-11-19 ENCOUNTER — OUTPATIENT (OUTPATIENT)
Dept: OUTPATIENT SERVICES | Facility: HOSPITAL | Age: 58
LOS: 1 days | End: 2018-11-19
Payer: COMMERCIAL

## 2018-11-19 ENCOUNTER — APPOINTMENT (OUTPATIENT)
Dept: INTERNAL MEDICINE | Facility: CLINIC | Age: 58
End: 2018-11-19
Payer: COMMERCIAL

## 2018-11-19 VITALS
HEIGHT: 63 IN | HEART RATE: 76 BPM | SYSTOLIC BLOOD PRESSURE: 104 MMHG | BODY MASS INDEX: 32.78 KG/M2 | WEIGHT: 185 LBS | DIASTOLIC BLOOD PRESSURE: 52 MMHG | OXYGEN SATURATION: 98 %

## 2018-11-19 DIAGNOSIS — Z98.89 OTHER SPECIFIED POSTPROCEDURAL STATES: Chronic | ICD-10-CM

## 2018-11-19 DIAGNOSIS — I10 ESSENTIAL (PRIMARY) HYPERTENSION: ICD-10-CM

## 2018-11-19 DIAGNOSIS — I25.10 ATHEROSCLEROTIC HEART DISEASE OF NATIVE CORONARY ARTERY WITHOUT ANGINA PECTORIS: ICD-10-CM

## 2018-11-19 DIAGNOSIS — E11.9 TYPE 2 DIABETES MELLITUS WITHOUT COMPLICATIONS: ICD-10-CM

## 2018-11-19 LAB
GLUCOSE BLDC GLUCOMTR-MCNC: 270
HBA1C MFR BLD HPLC: 7.9

## 2018-11-19 PROCEDURE — G0463: CPT

## 2018-11-19 PROCEDURE — 99213 OFFICE O/P EST LOW 20 MIN: CPT | Mod: GE

## 2018-11-19 NOTE — HISTORY OF PRESENT ILLNESS
[FreeTextEntry1] : Dizziness  [de-identified] : 56 yo F w pmhx of T2DM (A1C 8.9), CAD s/p RCA stent, HTN, pulmonary sarcoid presenting for a follow up. \par \par Patient still has minimal residual cough. Her nasal congestion has resolved.   \par \par Patient has been using lantus 20am 20 pm and humalog 15/15/15  before meals. Pt reports feeling dizzy last week, however , states that she was taking a lot of vitamins (one a day woman, B12, "one for my hair and nails") She stopped taking everything except one a day and now her dizziness is better.  Patient keeps snacks on her. She has not been checking her fingersticks everyday - few AM fingersticks are between 100 and 180. \par

## 2018-11-19 NOTE — REVIEW OF SYSTEMS
[Fever] : no fever [Chills] : no chills [Fatigue] : no fatigue [Night Sweats] : no night sweats [Pain] : no pain [Vision Problems] : no vision problems [Nasal Discharge] : no nasal discharge [Sore Throat] : no sore throat [Chest Pain] : no chest pain [Shortness Of Breath] : no shortness of breath [Abdominal Pain] : no abdominal pain [Nausea] : no nausea [Vomiting] : no vomiting [Dysuria] : no dysuria [Joint Pain] : no joint pain [Headache] : no headache [Dizziness] : no dizziness

## 2018-11-19 NOTE — PLAN
[FreeTextEntry1] : 1. T2DM\par - POCT A1c 8.9% last visit --> improved to 7.9% today  \par - will continue with current insulin regimen with patient agreeing to always ensure she eats within 15 minutes of administration of Humalog\par - Discussed treatment of hypoglycemia with juice \par - Patient advised to check FS pre breakfast and pre dinner\par \par 2. HTN\par - BP on the softer side. \par - Cw lisinopril \par - Given that pt has CAD, will decrease beta blocker and change lopressor to Toprol \par \par 3. CAD\par - Cw statin, asa \par \par 4. HCM\par - flu shot given today \par \par RTC in 10 wks \par  \par Dw Dr Cantrell

## 2018-11-19 NOTE — ASSESSMENT
[FreeTextEntry1] : 58 yo F w/PMH of IDT2DM, CAD s/p RCA stent on DAPT, HTN, pulmonary sarcoid presenting for DM follow up

## 2018-11-19 NOTE — PHYSICAL EXAM
[No Acute Distress] : no acute distress [Well-Appearing] : well-appearing [Normal Sclera/Conjunctiva] : normal sclera/conjunctiva [PERRL] : pupils equal round and reactive to light [EOMI] : extraocular movements intact [Normal Oropharynx] : the oropharynx was normal [Supple] : supple [Clear to Auscultation] : lungs were clear to auscultation bilaterally [Normal S1, S2] : normal S1 and S2 [No Murmur] : no murmur heard [No Edema] : there was no peripheral edema [Soft] : abdomen soft [Non Tender] : non-tender [No HSM] : no HSM [Normal Bowel Sounds] : normal bowel sounds [No CVA Tenderness] : no CVA  tenderness [Speech Grossly Normal] : speech grossly normal

## 2018-12-04 NOTE — DISCHARGE NOTE ADULT - FUNCTIONAL SCREEN CURRENT LEVEL: DRESSING, MLM
Telephone Encounter by Jessica Acevedo, [address] at 06/14/18 07:50 AM     Author:  RYDER Barragan Service:  (none) Author Type:  Certified Medical Assistant     Filed:  06/14/18 07:53 AM Encounter Date:  6/10/2018 Status:  Signed     :  Jessica Acevedo, [address] (Certified Medical Assistant)            Rx already sent to pharmacy.[PR1.1M]    To [facility]q [facility] pool[PR1.1T]          Revision History        User Key Date/Time User Provider Type Action    > PR1.1 06/14/18 07:53 AM RYDER Barragan Certified Medical Assistant Sign    M - Manual, T - Template (0) independent

## 2019-01-10 ENCOUNTER — APPOINTMENT (OUTPATIENT)
Dept: OPHTHALMOLOGY | Facility: CLINIC | Age: 59
End: 2019-01-10

## 2019-01-15 ENCOUNTER — RX RENEWAL (OUTPATIENT)
Age: 59
End: 2019-01-15

## 2019-01-27 ENCOUNTER — RESULT CHARGE (OUTPATIENT)
Age: 59
End: 2019-01-27

## 2019-01-28 ENCOUNTER — APPOINTMENT (OUTPATIENT)
Dept: INTERNAL MEDICINE | Facility: CLINIC | Age: 59
End: 2019-01-28
Payer: COMMERCIAL

## 2019-01-28 ENCOUNTER — OUTPATIENT (OUTPATIENT)
Dept: OUTPATIENT SERVICES | Facility: HOSPITAL | Age: 59
LOS: 1 days | End: 2019-01-28
Payer: COMMERCIAL

## 2019-01-28 VITALS
WEIGHT: 190 LBS | BODY MASS INDEX: 33.66 KG/M2 | DIASTOLIC BLOOD PRESSURE: 70 MMHG | HEIGHT: 63 IN | SYSTOLIC BLOOD PRESSURE: 115 MMHG

## 2019-01-28 DIAGNOSIS — I25.10 ATHEROSCLEROTIC HEART DISEASE OF NATIVE CORONARY ARTERY WITHOUT ANGINA PECTORIS: ICD-10-CM

## 2019-01-28 DIAGNOSIS — I10 ESSENTIAL (PRIMARY) HYPERTENSION: ICD-10-CM

## 2019-01-28 DIAGNOSIS — E11.9 TYPE 2 DIABETES MELLITUS WITHOUT COMPLICATIONS: ICD-10-CM

## 2019-01-28 DIAGNOSIS — Z98.89 OTHER SPECIFIED POSTPROCEDURAL STATES: Chronic | ICD-10-CM

## 2019-01-28 DIAGNOSIS — E78.5 HYPERLIPIDEMIA, UNSPECIFIED: ICD-10-CM

## 2019-01-28 PROCEDURE — 99213 OFFICE O/P EST LOW 20 MIN: CPT | Mod: GE

## 2019-01-28 PROCEDURE — G0463: CPT

## 2019-01-28 RX ORDER — METOPROLOL TARTRATE 50 MG/1
50 TABLET, FILM COATED ORAL DAILY
Qty: 90 | Refills: 3 | Status: DISCONTINUED | COMMUNITY
Start: 2018-10-09 | End: 2019-01-28

## 2019-01-28 RX ORDER — METOPROLOL TARTRATE 25 MG/1
25 TABLET, FILM COATED ORAL
Qty: 90 | Refills: 3 | Status: DISCONTINUED | COMMUNITY
End: 2019-01-28

## 2019-01-29 NOTE — ASSESSMENT
[FreeTextEntry1] : 58 yo F w/PMH of IDT2DM (A1C 7.9), CAD s/p RCA stent on ASA only, HTN, pulmonary sarcoid presenting for DM follow up

## 2019-01-29 NOTE — PLAN
[FreeTextEntry1] : 1. T2DM\par - POCT A1c 7.9% last visit --> pt had been out of lantus for some time   \par - Will change humalog to 20/15/15  except on evening w large dinner can take 20u pre dinner\par - c/w lantus 20 am 20 pm \par - Patient advised to continue checking FS pre breakfast and pre dinner\par \par 2. HTN\par - BP on the softer side. \par - Patient has been intermittently off lisinopril including now due to completion of script without renewals. Given that bp is soft today, will have to reintroduce Ace at a lower dose and monitor to see if pt tolerates it . Can consider deceasing Toprol to 25. \par - Plan to start 2.5 mg lisinopril next visit as pt has CAD \par \par 3. CAD\par - Cw statin - Lipitor increased from 20 to 40mg \par - Cw asa\par - Plavix d/c's in August? Will defer to cards regarding continuation  \par \par 4. HCM\par - mammogram referral given\par - gyn referral given\par \par RTC in 2-3 months for CPE \par  \par Dw Dr Cano

## 2019-01-29 NOTE — DIETITIAN INITIAL EVALUATION ADULT. - PROBLEM/PLAN-1
DISPLAY PLAN FREE TEXT
Pt reports cough and SOB for several weeks. Patient reports that symptoms have worsened over the past few days. Patient reports cough is productive, patient does not know the color of sputum. Patient denies any hx of CHF, COPD or asthma. Patient's pulse ox <90% without oxygen, patient's pulse ox currently 96% with nasal cannula 3L. Patient's BS on auscultation: rhonchi and wheezes. Patient denies taking OTC meds for symptom relief. Patient's labs drawn, #20g placed into right upper arm. Patient is pending further evaluation by MD

## 2019-01-29 NOTE — END OF VISIT
[] : Resident [FreeTextEntry3] : 57yoF with DM2 (7.9% A1c in November 2018), HTN, CAD s/p stent presents for follow up visit. Patient ran out of Lantus recently - had been taking 20 units BID and Humalog 20 QAC. Patient with hypoglycemia pre-dinner - will decrease humalog to 15 units pre-lunch. Patient has been taking lisinopril intermittently and BP soft today - consider restarting lisinopril at lower dose. CAD - follow up with cardiology.

## 2019-01-29 NOTE — HISTORY OF PRESENT ILLNESS
[de-identified] : 58 yo F w pmhx of T2DM (A1C 8.9), CAD s/p RCA stent, HTN, pulmonary sarcoid presenting for a follow up. \par \par Patient had been using lantus 20am 20 pm and humalog 20/20/20  before meals. She has been checking her FS almost daily and AM FG ranged between 100-120 until 2 weeks ago when she ran out of lantus where her FG mynor to 300s. She had one  episode of hypoglycemia before dinner w FS 68. Patient keeps snacks on her and knows what to do if she is feeling dizzy or if her blood glucose is low. She states she eats very small lunch and her dinners are sometimes just snacks and sometimes heavier.

## 2019-01-31 ENCOUNTER — APPOINTMENT (OUTPATIENT)
Dept: OPHTHALMOLOGY | Facility: CLINIC | Age: 59
End: 2019-01-31
Payer: COMMERCIAL

## 2019-02-04 ENCOUNTER — APPOINTMENT (OUTPATIENT)
Dept: OBGYN | Facility: CLINIC | Age: 59
End: 2019-02-04
Payer: COMMERCIAL

## 2019-02-04 ENCOUNTER — OUTPATIENT (OUTPATIENT)
Dept: OUTPATIENT SERVICES | Facility: HOSPITAL | Age: 59
LOS: 1 days | End: 2019-02-04
Payer: COMMERCIAL

## 2019-02-04 VITALS — WEIGHT: 185 LBS | BODY MASS INDEX: 32.77 KG/M2 | DIASTOLIC BLOOD PRESSURE: 80 MMHG | SYSTOLIC BLOOD PRESSURE: 130 MMHG

## 2019-02-04 DIAGNOSIS — Z98.89 OTHER SPECIFIED POSTPROCEDURAL STATES: Chronic | ICD-10-CM

## 2019-02-04 DIAGNOSIS — N76.0 ACUTE VAGINITIS: ICD-10-CM

## 2019-02-04 PROCEDURE — 99212 OFFICE O/P EST SF 10 MIN: CPT | Mod: GC

## 2019-02-04 PROCEDURE — G0463: CPT

## 2019-02-07 ENCOUNTER — APPOINTMENT (OUTPATIENT)
Dept: MAMMOGRAPHY | Facility: IMAGING CENTER | Age: 59
End: 2019-02-07
Payer: COMMERCIAL

## 2019-02-07 ENCOUNTER — OUTPATIENT (OUTPATIENT)
Dept: OUTPATIENT SERVICES | Facility: HOSPITAL | Age: 59
LOS: 1 days | End: 2019-02-07
Payer: COMMERCIAL

## 2019-02-07 DIAGNOSIS — Z00.00 ENCOUNTER FOR GENERAL ADULT MEDICAL EXAMINATION WITHOUT ABNORMAL FINDINGS: ICD-10-CM

## 2019-02-07 DIAGNOSIS — Z98.89 OTHER SPECIFIED POSTPROCEDURAL STATES: Chronic | ICD-10-CM

## 2019-02-07 PROCEDURE — 77067 SCR MAMMO BI INCL CAD: CPT

## 2019-02-07 PROCEDURE — 77063 BREAST TOMOSYNTHESIS BI: CPT | Mod: 26

## 2019-02-07 PROCEDURE — 77063 BREAST TOMOSYNTHESIS BI: CPT

## 2019-02-07 PROCEDURE — 77067 SCR MAMMO BI INCL CAD: CPT | Mod: 26

## 2019-02-08 DIAGNOSIS — Z01.419 ENCOUNTER FOR GYNECOLOGICAL EXAMINATION (GENERAL) (ROUTINE) WITHOUT ABNORMAL FINDINGS: ICD-10-CM

## 2019-02-11 ENCOUNTER — RX RENEWAL (OUTPATIENT)
Age: 59
End: 2019-02-11

## 2019-02-14 ENCOUNTER — APPOINTMENT (OUTPATIENT)
Dept: OPHTHALMOLOGY | Facility: CLINIC | Age: 59
End: 2019-02-14
Payer: COMMERCIAL

## 2019-02-14 PROCEDURE — 92012 INTRM OPH EXAM EST PATIENT: CPT

## 2019-02-21 NOTE — PHYSICAL EXAM
[Awake] : awake [Soft] : soft [Oriented x3] : oriented to person, place, and time [No Lesions] : no genitalia lesions [Normal] : uterus [Labia Majora] : labia major [Pink Rugae] : pink rugae [Normal Position] : in a normal position [Uterine Adnexae] : were not tender and not enlarged [Alert] : not alert [Acute Distress] : no acute distress [Mass] : no breast mass [Nipple Discharge] : no nipple discharge [Axillary LAD] : no axillary lymphadenopathy [Tender] : non tender [Distended] : not distended [H/Smegaly] : no hepatosplenomegaly [Depressed Mood] : not depressed [Flat Affect] : affect not flat [Labia Majora Erythema] : no erythema of the labia majora [Discharge] : had no discharge [Tenderness] : nontender [Adnexa Tenderness] : were not tender [Ovarian Mass (___ Cm)] : there were no adnexal masses

## 2019-02-21 NOTE — HISTORY OF PRESENT ILLNESS
[Hot Flashes] : hot flashes [Approximately ___ (Month)] : the LMP was approximately [unfilled] month(s) ago [Post-Menopause, No Sxs] : post-menopausal, currently without symptoms [Night Sweats] : no night sweats [Vaginal Itching] : no vaginal itching [Dyspareunia] : no dyspareunia [Mood Changes] : no mood changes [Regular Cycle Intervals] : periods have been irregular [Sexually Active] : is not sexually active

## 2019-02-22 ENCOUNTER — MEDICATION RENEWAL (OUTPATIENT)
Age: 59
End: 2019-02-22

## 2019-02-22 RX ORDER — BLOOD SUGAR DIAGNOSTIC
STRIP MISCELLANEOUS
Qty: 180 | Refills: 1 | Status: DISCONTINUED | COMMUNITY
Start: 2018-11-19 | End: 2019-02-22

## 2019-03-18 ENCOUNTER — APPOINTMENT (OUTPATIENT)
Dept: CARDIOLOGY | Facility: CLINIC | Age: 59
End: 2019-03-18
Payer: COMMERCIAL

## 2019-03-18 ENCOUNTER — NON-APPOINTMENT (OUTPATIENT)
Age: 59
End: 2019-03-18

## 2019-03-18 VITALS
BODY MASS INDEX: 32.78 KG/M2 | WEIGHT: 185 LBS | DIASTOLIC BLOOD PRESSURE: 79 MMHG | SYSTOLIC BLOOD PRESSURE: 124 MMHG | HEIGHT: 63 IN | HEART RATE: 66 BPM | OXYGEN SATURATION: 100 %

## 2019-03-18 PROCEDURE — 93000 ELECTROCARDIOGRAM COMPLETE: CPT

## 2019-03-18 PROCEDURE — 99215 OFFICE O/P EST HI 40 MIN: CPT

## 2019-03-24 NOTE — PHYSICAL EXAM
[General Appearance - Well Developed] : well developed [Normal Appearance] : normal appearance [Well Groomed] : well groomed [General Appearance - Well Nourished] : well nourished [No Deformities] : no deformities [General Appearance - In No Acute Distress] : no acute distress [Normal Conjunctiva] : the conjunctiva exhibited no abnormalities [Eyelids - No Xanthelasma] : the eyelids demonstrated no xanthelasmas [Normal Oral Mucosa] : normal oral mucosa [No Oral Pallor] : no oral pallor [No Oral Cyanosis] : no oral cyanosis [Normal Jugular Venous A Waves Present] : normal jugular venous A waves present [Normal Jugular Venous V Waves Present] : normal jugular venous V waves present [No Jugular Venous Early A Waves] : no jugular venous early A waves [Respiration, Rhythm And Depth] : normal respiratory rhythm and effort [Exaggerated Use Of Accessory Muscles For Inspiration] : no accessory muscle use [Auscultation Breath Sounds / Voice Sounds] : lungs were clear to auscultation bilaterally [Heart Rate And Rhythm] : heart rate and rhythm were normal [Heart Sounds] : normal S1 and S2 [Murmurs] : no murmurs present [Abdomen Soft] : soft [Abdomen Tenderness] : non-tender [Abdomen Mass (___ Cm)] : no abdominal mass palpated [Abnormal Walk] : normal gait [Gait - Sufficient For Exercise Testing] : the gait was sufficient for exercise testing [Nail Clubbing] : no clubbing of the fingernails [Cyanosis, Localized] : no localized cyanosis [Petechial Hemorrhages (___cm)] : no petechial hemorrhages [Skin Color & Pigmentation] : normal skin color and pigmentation [] : no rash [No Venous Stasis] : no venous stasis [Skin Lesions] : no skin lesions [No Skin Ulcers] : no skin ulcer [No Xanthoma] : no  xanthoma was observed [Oriented To Time, Place, And Person] : oriented to person, place, and time [Affect] : the affect was normal [Mood] : the mood was normal [No Anxiety] : not feeling anxious

## 2019-03-24 NOTE — DISCUSSION/SUMMARY
[FreeTextEntry1] : CAD- stable s/p RCA stent\par \par HTN- controlled\par \par HLD- stable\par \par Followup in 6 mths

## 2019-03-24 NOTE — HISTORY OF PRESENT ILLNESS
[FreeTextEntry1] :  58 year old female with HTN controlled on meds, HLD stable on statins s/p episode of UA and cath with stent of RCA.  Pt feeling well since stent without CP, SOB or H/A

## 2019-03-29 ENCOUNTER — RX RENEWAL (OUTPATIENT)
Age: 59
End: 2019-03-29

## 2019-04-02 ENCOUNTER — RX RENEWAL (OUTPATIENT)
Age: 59
End: 2019-04-02

## 2019-05-13 ENCOUNTER — LABORATORY RESULT (OUTPATIENT)
Age: 59
End: 2019-05-13

## 2019-05-13 ENCOUNTER — APPOINTMENT (OUTPATIENT)
Dept: INTERNAL MEDICINE | Facility: CLINIC | Age: 59
End: 2019-05-13

## 2019-05-14 ENCOUNTER — OUTPATIENT (OUTPATIENT)
Dept: OUTPATIENT SERVICES | Facility: HOSPITAL | Age: 59
LOS: 1 days | End: 2019-05-14
Payer: COMMERCIAL

## 2019-05-14 ENCOUNTER — APPOINTMENT (OUTPATIENT)
Dept: INTERNAL MEDICINE | Facility: CLINIC | Age: 59
End: 2019-05-14
Payer: COMMERCIAL

## 2019-05-14 VITALS
BODY MASS INDEX: 32.96 KG/M2 | HEIGHT: 63 IN | DIASTOLIC BLOOD PRESSURE: 70 MMHG | SYSTOLIC BLOOD PRESSURE: 128 MMHG | WEIGHT: 186 LBS

## 2019-05-14 DIAGNOSIS — Z98.89 OTHER SPECIFIED POSTPROCEDURAL STATES: Chronic | ICD-10-CM

## 2019-05-14 DIAGNOSIS — I10 ESSENTIAL (PRIMARY) HYPERTENSION: ICD-10-CM

## 2019-05-14 PROCEDURE — 99396 PREV VISIT EST AGE 40-64: CPT | Mod: GC

## 2019-05-14 PROCEDURE — 85027 COMPLETE CBC AUTOMATED: CPT

## 2019-05-14 PROCEDURE — 82607 VITAMIN B-12: CPT

## 2019-05-14 PROCEDURE — G0463: CPT

## 2019-05-14 PROCEDURE — 80053 COMPREHEN METABOLIC PANEL: CPT

## 2019-05-14 PROCEDURE — 82043 UR ALBUMIN QUANTITATIVE: CPT

## 2019-05-14 PROCEDURE — 36415 COLL VENOUS BLD VENIPUNCTURE: CPT

## 2019-05-14 PROCEDURE — 82306 VITAMIN D 25 HYDROXY: CPT

## 2019-05-14 PROCEDURE — 84443 ASSAY THYROID STIM HORMONE: CPT

## 2019-05-14 RX ORDER — ERYTHROMYCIN 5 MG/G
5 OINTMENT OPHTHALMIC
Qty: 1 | Refills: 5 | Status: DISCONTINUED | COMMUNITY
Start: 2019-02-14 | End: 2019-05-14

## 2019-05-15 LAB
24R-OH-CALCIDIOL SERPL-MCNC: 30.2 NG/ML — SIGNIFICANT CHANGE UP (ref 30–80)
ALBUMIN SERPL ELPH-MCNC: 4.5 G/DL — SIGNIFICANT CHANGE UP (ref 3.3–5)
ALP SERPL-CCNC: 75 U/L — SIGNIFICANT CHANGE UP (ref 40–120)
ALT FLD-CCNC: 22 U/L — SIGNIFICANT CHANGE UP (ref 10–45)
ANION GAP SERPL CALC-SCNC: 10 MMOL/L — SIGNIFICANT CHANGE UP (ref 5–17)
AST SERPL-CCNC: 21 U/L — SIGNIFICANT CHANGE UP (ref 10–40)
BILIRUB SERPL-MCNC: 0.2 MG/DL — SIGNIFICANT CHANGE UP (ref 0.2–1.2)
BUN SERPL-MCNC: 15 MG/DL — SIGNIFICANT CHANGE UP (ref 7–23)
CALCIUM SERPL-MCNC: 9.8 MG/DL — SIGNIFICANT CHANGE UP (ref 8.4–10.5)
CHLORIDE SERPL-SCNC: 103 MMOL/L — SIGNIFICANT CHANGE UP (ref 96–108)
CO2 SERPL-SCNC: 27 MMOL/L — SIGNIFICANT CHANGE UP (ref 22–31)
CREAT ?TM UR-MCNC: 57 MG/DL — SIGNIFICANT CHANGE UP
CREAT SERPL-MCNC: 0.79 MG/DL — SIGNIFICANT CHANGE UP (ref 0.5–1.3)
GLUCOSE SERPL-MCNC: 236 MG/DL — HIGH (ref 70–99)
HCT VFR BLD CALC: 37.6 % — SIGNIFICANT CHANGE UP (ref 34.5–45)
HGB BLD-MCNC: 11.1 G/DL — LOW (ref 11.5–15.5)
MCHC RBC-ENTMCNC: 27.1 PG — SIGNIFICANT CHANGE UP (ref 27–34)
MCHC RBC-ENTMCNC: 29.5 GM/DL — LOW (ref 32–36)
MCV RBC AUTO: 91.9 FL — SIGNIFICANT CHANGE UP (ref 80–100)
MICROALBUMIN UR-MCNC: 1.3 MG/DL — SIGNIFICANT CHANGE UP
MICROALBUMIN/CREAT UR-RTO: 23 MG/G — SIGNIFICANT CHANGE UP (ref 0–30)
PLATELET # BLD AUTO: 246 K/UL — SIGNIFICANT CHANGE UP (ref 150–400)
POTASSIUM SERPL-MCNC: 5 MMOL/L — SIGNIFICANT CHANGE UP (ref 3.5–5.3)
POTASSIUM SERPL-SCNC: 5 MMOL/L — SIGNIFICANT CHANGE UP (ref 3.5–5.3)
PROT SERPL-MCNC: 7 G/DL — SIGNIFICANT CHANGE UP (ref 6–8.3)
RBC # BLD: 4.09 M/UL — SIGNIFICANT CHANGE UP (ref 3.8–5.2)
RBC # FLD: 12.9 % — SIGNIFICANT CHANGE UP (ref 10.3–14.5)
SODIUM SERPL-SCNC: 140 MMOL/L — SIGNIFICANT CHANGE UP (ref 135–145)
TSH SERPL-MCNC: 0.92 UIU/ML — SIGNIFICANT CHANGE UP (ref 0.27–4.2)
VIT B12 SERPL-MCNC: 421 PG/ML — SIGNIFICANT CHANGE UP (ref 232–1245)
WBC # BLD: 8.72 K/UL — SIGNIFICANT CHANGE UP (ref 3.8–10.5)
WBC # FLD AUTO: 8.72 K/UL — SIGNIFICANT CHANGE UP (ref 3.8–10.5)

## 2019-05-15 NOTE — COUNSELING
[Healthy eating counseling provided] : healthy eating [Weight management counseling provided] : Weight management [Activity counseling provided] : activity [Low Fat Diet] : Low fat diet [Walking] : Walking [___ min/wk activity recommended] : [unfilled] min/wk activity recommended [Good understanding] : Patient has a good understanding of lifestyle changes and the steps needed to achieve self management goals [ - Annual Depression Screening] : Annual Depression Screening

## 2019-05-15 NOTE — PHYSICAL EXAM
[No Acute Distress] : no acute distress [Well-Appearing] : well-appearing [Normal Sclera/Conjunctiva] : normal sclera/conjunctiva [PERRL] : pupils equal round and reactive to light [EOMI] : extraocular movements intact [Supple] : supple [Normal Oropharynx] : the oropharynx was normal [Clear to Auscultation] : lungs were clear to auscultation bilaterally [No Murmur] : no murmur heard [Normal S1, S2] : normal S1 and S2 [No Edema] : there was no peripheral edema [Soft] : abdomen soft [Non Tender] : non-tender [No HSM] : no HSM [Normal Bowel Sounds] : normal bowel sounds [No CVA Tenderness] : no CVA  tenderness [Speech Grossly Normal] : speech grossly normal [Right Foot Was Examined] : Right foot ~C was examined [Left Foot Was Examined] : left foot ~C was examined [None] : no ulcers in either foot were found [] : both feet

## 2019-05-16 NOTE — ASSESSMENT
[FreeTextEntry1] : 57 yo F w pmhx of T2DM, CAD s/p RCA stent, HTN, pulmonary sarcoid presenting for CPE

## 2019-05-16 NOTE — HEALTH RISK ASSESSMENT
[Good] : ~his/her~  mood as  good [No falls in past year] : Patient reported no falls in the past year [0] : 2) Feeling down, depressed, or hopeless: Not at all (0) [Fully functional (bathing, dressing, toileting, transferring, walking, feeding)] : Fully functional (bathing, dressing, toileting, transferring, walking, feeding) [Fully functional (using the telephone, shopping, preparing meals, housekeeping, doing laundry, using] : Fully functional and needs no help or supervision to perform IADLs (using the telephone, shopping, preparing meals, housekeeping, doing laundry, using transportation, managing medications and managing finances) [] : No [[id number]] : 0 [Change in mental status noted] : No change in mental status noted

## 2019-05-16 NOTE — PLAN
[FreeTextEntry1] : \par \par #. T2DM\par - POCT A1c 7.9% last visit --> 8.0 today. Which is at targer. \par - Continue with humalog to 15/15/15  except on evening w large dinner can take 20u pre dinner\par - c/w lantus 20 am 20 pm \par - Patient advised to continue checking FS pre breakfast and pre dinner\par - Sp pneumovax \par - Diabetic foot exam normal today but will refer to podiatry for orthotics fitting \par - Pt had an eye exam this year \par - Check urine microalbumin \par \par #Feet pain \par - Could be DM or vit deficiency, check Vit B12 levels \par - Check TSH \par \par #. HTN\par - BP wnl \par - C/w lisinorpil 10, metoprolol 25 daily \par \par #. CAD, HLD\par - Cw statin - Lipitor increased from 20 to 40mg  last visit.  Check fasting lipids at next visit\par - Cw asa\par - Plavix d/c's in August last year. Will defer to cards regarding continuation  \par \par 4. HCM\par - Nl mammo 2/19 \par - UTD w pap - next in 2021 \par - FIT negative 8/18\par - hep C negative 4/18\par \par \par Dw Dr Cantrell\par RTC in 3-4 mo for f/u

## 2019-05-16 NOTE — REVIEW OF SYSTEMS
[Hot Flashes] : hot flashes [Fever] : no fever [Chills] : no chills [Fatigue] : no fatigue [Pain] : no pain [Vision Problems] : no vision problems [Night Sweats] : no night sweats [Sore Throat] : no sore throat [Nasal Discharge] : no nasal discharge [Shortness Of Breath] : no shortness of breath [Abdominal Pain] : no abdominal pain [Chest Pain] : no chest pain [Vomiting] : no vomiting [Nausea] : no nausea [Dysuria] : no dysuria [Joint Pain] : no joint pain [Headache] : no headache [Dizziness] : no dizziness

## 2019-05-16 NOTE — HISTORY OF PRESENT ILLNESS
[FreeTextEntry1] : CPE  [de-identified] : 57 yo F w pmhx of T2DM, CAD s/p RCA stent, HTN, pulmonary sarcoid presenting for CPE\par \par Patient had been using lantus 20am and 20 pm, and humalog 15/15/15  before meals. She has been checking her FS almost daily and AM FG ranged between . She had one  episode of hypoglycemia in AM with FS 80. She was asymptomatic. Patient keeps snacks on her and knows what to do if she is feeling dizzy or if her blood glucose is low. She states she eats very small lunch and her dinners are sometimes just snacks and sometimes heavier.  \par \par Patient does endorse hot flashes and intermittent chills. Additionally patient complains that when she is on her feet, she feels like the soles of her feet are really numb and she doesn't feel there is enough padding left. \par \par Patient has been more mindful of her diet and exercising by walking as much as she can. 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 She has multiple grandchildren who keep her active. \par Patient denies smoking/drinking/toxic habits.

## 2019-05-17 LAB — HBA1C MFR BLD HPLC: 8

## 2019-05-20 DIAGNOSIS — E11.9 TYPE 2 DIABETES MELLITUS WITHOUT COMPLICATIONS: ICD-10-CM

## 2019-05-20 DIAGNOSIS — Z00.00 ENCOUNTER FOR GENERAL ADULT MEDICAL EXAMINATION WITHOUT ABNORMAL FINDINGS: ICD-10-CM

## 2019-05-20 DIAGNOSIS — I25.10 ATHEROSCLEROTIC HEART DISEASE OF NATIVE CORONARY ARTERY WITHOUT ANGINA PECTORIS: ICD-10-CM

## 2019-08-01 ENCOUNTER — APPOINTMENT (OUTPATIENT)
Dept: OPHTHALMOLOGY | Facility: CLINIC | Age: 59
End: 2019-08-01
Payer: COMMERCIAL

## 2019-08-01 ENCOUNTER — NON-APPOINTMENT (OUTPATIENT)
Age: 59
End: 2019-08-01

## 2019-08-01 PROCEDURE — 92014 COMPRE OPH EXAM EST PT 1/>: CPT

## 2019-08-12 ENCOUNTER — APPOINTMENT (OUTPATIENT)
Dept: INTERNAL MEDICINE | Facility: CLINIC | Age: 59
End: 2019-08-12
Payer: COMMERCIAL

## 2019-08-12 ENCOUNTER — OUTPATIENT (OUTPATIENT)
Dept: OUTPATIENT SERVICES | Facility: HOSPITAL | Age: 59
LOS: 1 days | End: 2019-08-12
Payer: COMMERCIAL

## 2019-08-12 VITALS
DIASTOLIC BLOOD PRESSURE: 80 MMHG | WEIGHT: 184 LBS | HEIGHT: 63 IN | SYSTOLIC BLOOD PRESSURE: 122 MMHG | BODY MASS INDEX: 32.6 KG/M2

## 2019-08-12 DIAGNOSIS — D86.9 SARCOIDOSIS, UNSPECIFIED: ICD-10-CM

## 2019-08-12 DIAGNOSIS — I10 ESSENTIAL (PRIMARY) HYPERTENSION: ICD-10-CM

## 2019-08-12 DIAGNOSIS — Z98.89 OTHER SPECIFIED POSTPROCEDURAL STATES: Chronic | ICD-10-CM

## 2019-08-12 PROCEDURE — 99213 OFFICE O/P EST LOW 20 MIN: CPT | Mod: GE

## 2019-08-12 PROCEDURE — G0463: CPT

## 2019-08-13 PROBLEM — D86.9 SARCOID: Status: ACTIVE | Noted: 2018-08-02

## 2019-08-13 NOTE — PHYSICAL EXAM
[No Acute Distress] : no acute distress [Well Developed] : well developed [Normal Sclera/Conjunctiva] : normal sclera/conjunctiva [PERRL] : pupils equal round and reactive to light [EOMI] : extraocular movements intact [Normal Outer Ear/Nose] : the outer ears and nose were normal in appearance [No JVD] : no jugular venous distention [Normal Oropharynx] : the oropharynx was normal [No Lymphadenopathy] : no lymphadenopathy [Thyroid Normal, No Nodules] : the thyroid was normal and there were no nodules present [No Respiratory Distress] : no respiratory distress  [Clear to Auscultation] : lungs were clear to auscultation bilaterally [Normal Rate] : normal rate  [Regular Rhythm] : with a regular rhythm [Soft] : abdomen soft [Normal S1, S2] : normal S1 and S2 [No Edema] : there was no peripheral edema [Non Tender] : non-tender [Non-distended] : non-distended [Grossly Normal Strength/Tone] : grossly normal strength/tone [Normal Bowel Sounds] : normal bowel sounds [Coordination Grossly Intact] : coordination grossly intact [No Rash] : no rash [Comprehensive Foot Exam Normal] : Right and left foot were examined and both feet are normal. No ulcers in either foot. Toes are normal and with full ROM.  Normal tactile sensation with monofilament testing throughout both feet [No Focal Deficits] : no focal deficits

## 2019-08-14 NOTE — REVIEW OF SYSTEMS
[Discharge] : no discharge [Night Sweats] : no night sweats [Fever] : no fever [Vision Problems] : no vision problems [Earache] : no earache [Chest Pain] : no chest pain [Nasal Discharge] : no nasal discharge [Orthopnea] : no orthopnea [Palpitations] : no palpitations [Shortness Of Breath] : no shortness of breath [Cough] : no cough [Dyspnea on Exertion] : no dyspnea on exertion [Abdominal Pain] : no abdominal pain [Nausea] : no nausea [Constipation] : no constipation [Vomiting] : no vomiting [Diarrhea] : diarrhea [Dysuria] : no dysuria [Hematuria] : no hematuria [Joint Pain] : no joint pain [Muscle Pain] : no muscle pain [Itching] : no itching [Skin Rash] : no skin rash [Headache] : no headache [Memory Loss] : no memory loss

## 2019-08-14 NOTE — HISTORY OF PRESENT ILLNESS
[de-identified] : Ms. FAIR is a 58 year woman with PMHx significant for IDT2DM (last A1c 8.0%), CAD s/p RCA stent, HTN, pulmonary sarcoid presenting for diabetes follow up. Since being seen in clinic last pt says that she has been eating more fruits. She is well aware of a proper diabetic diet. She experiences difficulty controlling herself since she prefers to eat fruits during the summer. Also eats rice. Otherwise she tries to abstain from eating extra carbs/sugars. She has been measuring her fasting glucoses 2x per day inconsistently. When she does measure her sugars, she reports the average being ~160. Sometimes >200. No hypoglycemic events. She says she is on her feet typically all day, as she works as an elderly care-taker. She does not exercise. Denies any weakness, migraine, vision changes, numbness/tingling of extremities. Otherwise she has no other complaints today.\par

## 2019-08-15 DIAGNOSIS — D86.9 SARCOIDOSIS, UNSPECIFIED: ICD-10-CM

## 2019-08-15 DIAGNOSIS — E11.9 TYPE 2 DIABETES MELLITUS WITHOUT COMPLICATIONS: ICD-10-CM

## 2019-09-03 ENCOUNTER — APPOINTMENT (OUTPATIENT)
Dept: PULMONOLOGY | Facility: CLINIC | Age: 59
End: 2019-09-03
Payer: COMMERCIAL

## 2019-09-03 VITALS
TEMPERATURE: 98 F | DIASTOLIC BLOOD PRESSURE: 79 MMHG | HEART RATE: 72 BPM | OXYGEN SATURATION: 95 % | HEIGHT: 63 IN | BODY MASS INDEX: 32.78 KG/M2 | RESPIRATION RATE: 15 BRPM | WEIGHT: 185 LBS | SYSTOLIC BLOOD PRESSURE: 121 MMHG

## 2019-09-03 PROCEDURE — 99213 OFFICE O/P EST LOW 20 MIN: CPT

## 2019-09-03 NOTE — PHYSICAL EXAM
[General Appearance - Well Developed] : well developed [Normal Appearance] : normal appearance [Well Groomed] : well groomed [General Appearance - Well Nourished] : well nourished [No Deformities] : no deformities [General Appearance - In No Acute Distress] : no acute distress [Normal Conjunctiva] : the conjunctiva exhibited no abnormalities [Eyelids - No Xanthelasma] : the eyelids demonstrated no xanthelasmas [Normal Oropharynx] : normal oropharynx [Neck Appearance] : the appearance of the neck was normal [Neck Cervical Mass (___cm)] : no neck mass was observed [Jugular Venous Distention Increased] : there was no jugular-venous distention [Thyroid Diffuse Enlargement] : the thyroid was not enlarged [Thyroid Nodule] : there were no palpable thyroid nodules [Heart Rate And Rhythm] : heart rate and rhythm were normal [Heart Sounds] : normal S1 and S2 [Murmurs] : no murmurs present [Respiration, Rhythm And Depth] : normal respiratory rhythm and effort [Exaggerated Use Of Accessory Muscles For Inspiration] : no accessory muscle use [Auscultation Breath Sounds / Voice Sounds] : lungs were clear to auscultation bilaterally [Abdomen Soft] : soft [Abdomen Tenderness] : non-tender [Abdomen Mass (___ Cm)] : no abdominal mass palpated [Gait - Sufficient For Exercise Testing] : the gait was sufficient for exercise testing [Abnormal Walk] : normal gait [Nail Clubbing] : no clubbing of the fingernails [Cyanosis, Localized] : no localized cyanosis [Petechial Hemorrhages (___cm)] : no petechial hemorrhages [] : no ischemic changes [Deep Tendon Reflexes (DTR)] : deep tendon reflexes were 2+ and symmetric [Sensation] : the sensory exam was normal to light touch and pinprick [No Focal Deficits] : no focal deficits [Oriented To Time, Place, And Person] : oriented to person, place, and time [Impaired Insight] : insight and judgment were intact [Affect] : the affect was normal

## 2019-09-03 NOTE — DISCUSSION/SUMMARY
[FreeTextEntry1] : Sensitivity to environmental triggers such as perfumes, sanitizers, cleaning.\par Continue avoidance when able\par Does well with albuterol prn\par (in fact, when i looked at her inhaler, it is empty - but still has been helping her)!\par Refilled albuterol\par \par flu shot when available

## 2019-09-03 NOTE — HISTORY OF PRESENT ILLNESS
[FreeTextEntry1] : pt here for follow up\par h/o sensitivities to certain enviromental factors  - perfumes, cleaning products...\par when she smells them, feels sick, sob.\par improves with albuterol. generally tries to avoid the triggers\par only needs albuterol once every few weeks\par no asthma on PFT\par \par DM, CAD, HTN

## 2019-09-16 ENCOUNTER — APPOINTMENT (OUTPATIENT)
Dept: CARDIOLOGY | Facility: CLINIC | Age: 59
End: 2019-09-16
Payer: COMMERCIAL

## 2019-09-16 ENCOUNTER — NON-APPOINTMENT (OUTPATIENT)
Age: 59
End: 2019-09-16

## 2019-09-16 VITALS
HEART RATE: 70 BPM | WEIGHT: 183 LBS | DIASTOLIC BLOOD PRESSURE: 71 MMHG | SYSTOLIC BLOOD PRESSURE: 129 MMHG | OXYGEN SATURATION: 99 % | HEIGHT: 63 IN | BODY MASS INDEX: 32.43 KG/M2

## 2019-09-16 PROCEDURE — 99215 OFFICE O/P EST HI 40 MIN: CPT

## 2019-09-16 PROCEDURE — 93000 ELECTROCARDIOGRAM COMPLETE: CPT

## 2019-10-05 NOTE — PHYSICAL EXAM
[General Appearance - Well Developed] : well developed [Normal Appearance] : normal appearance [Well Groomed] : well groomed [General Appearance - Well Nourished] : well nourished [No Deformities] : no deformities [General Appearance - In No Acute Distress] : no acute distress [Normal Conjunctiva] : the conjunctiva exhibited no abnormalities [Eyelids - No Xanthelasma] : the eyelids demonstrated no xanthelasmas [Normal Oral Mucosa] : normal oral mucosa [No Oral Pallor] : no oral pallor [No Oral Cyanosis] : no oral cyanosis [Normal Jugular Venous A Waves Present] : normal jugular venous A waves present [Normal Jugular Venous V Waves Present] : normal jugular venous V waves present [No Jugular Venous Early A Waves] : no jugular venous early A waves [Respiration, Rhythm And Depth] : normal respiratory rhythm and effort [Exaggerated Use Of Accessory Muscles For Inspiration] : no accessory muscle use [Heart Rate And Rhythm] : heart rate and rhythm were normal [Auscultation Breath Sounds / Voice Sounds] : lungs were clear to auscultation bilaterally [Heart Sounds] : normal S1 and S2 [Murmurs] : no murmurs present [Abdomen Soft] : soft [Abdomen Tenderness] : non-tender [Abdomen Mass (___ Cm)] : no abdominal mass palpated [Abnormal Walk] : normal gait [Gait - Sufficient For Exercise Testing] : the gait was sufficient for exercise testing [Nail Clubbing] : no clubbing of the fingernails [Cyanosis, Localized] : no localized cyanosis [Petechial Hemorrhages (___cm)] : no petechial hemorrhages [Skin Color & Pigmentation] : normal skin color and pigmentation [] : no rash [No Venous Stasis] : no venous stasis [Skin Lesions] : no skin lesions [No Skin Ulcers] : no skin ulcer [No Xanthoma] : no  xanthoma was observed [Oriented To Time, Place, And Person] : oriented to person, place, and time [Affect] : the affect was normal [Mood] : the mood was normal [No Anxiety] : not feeling anxious

## 2019-11-11 ENCOUNTER — APPOINTMENT (OUTPATIENT)
Dept: INTERNAL MEDICINE | Facility: CLINIC | Age: 59
End: 2019-11-11

## 2019-11-26 ENCOUNTER — EMERGENCY (EMERGENCY)
Facility: HOSPITAL | Age: 59
LOS: 1 days | Discharge: HOME | End: 2019-11-26
Attending: EMERGENCY MEDICINE
Payer: SELF-PAY

## 2019-11-26 VITALS
TEMPERATURE: 98 F | SYSTOLIC BLOOD PRESSURE: 121 MMHG | RESPIRATION RATE: 16 BRPM | HEART RATE: 72 BPM | OXYGEN SATURATION: 97 % | DIASTOLIC BLOOD PRESSURE: 76 MMHG

## 2019-11-26 VITALS
HEART RATE: 82 BPM | DIASTOLIC BLOOD PRESSURE: 73 MMHG | RESPIRATION RATE: 18 BRPM | HEIGHT: 64 IN | SYSTOLIC BLOOD PRESSURE: 110 MMHG | OXYGEN SATURATION: 97 % | TEMPERATURE: 98 F | WEIGHT: 179.9 LBS

## 2019-11-26 DIAGNOSIS — Z98.89 OTHER SPECIFIED POSTPROCEDURAL STATES: Chronic | ICD-10-CM

## 2019-11-26 LAB
ALBUMIN SERPL ELPH-MCNC: 4 G/DL — SIGNIFICANT CHANGE UP (ref 3.3–5)
ALP SERPL-CCNC: 73 U/L — SIGNIFICANT CHANGE UP (ref 40–120)
ALT FLD-CCNC: 16 U/L — SIGNIFICANT CHANGE UP (ref 10–45)
ANION GAP SERPL CALC-SCNC: 15 MMOL/L — SIGNIFICANT CHANGE UP (ref 5–17)
AST SERPL-CCNC: 14 U/L — SIGNIFICANT CHANGE UP (ref 10–40)
BASOPHILS # BLD AUTO: 0.04 K/UL — SIGNIFICANT CHANGE UP (ref 0–0.2)
BASOPHILS NFR BLD AUTO: 0.3 % — SIGNIFICANT CHANGE UP (ref 0–2)
BILIRUB SERPL-MCNC: 0.6 MG/DL — SIGNIFICANT CHANGE UP (ref 0.2–1.2)
BUN SERPL-MCNC: 12 MG/DL — SIGNIFICANT CHANGE UP (ref 7–23)
CALCIUM SERPL-MCNC: 9.6 MG/DL — SIGNIFICANT CHANGE UP (ref 8.4–10.5)
CHLORIDE SERPL-SCNC: 93 MMOL/L — LOW (ref 96–108)
CO2 SERPL-SCNC: 23 MMOL/L — SIGNIFICANT CHANGE UP (ref 22–31)
CREAT SERPL-MCNC: 0.95 MG/DL — SIGNIFICANT CHANGE UP (ref 0.5–1.3)
EOSINOPHIL # BLD AUTO: 0.02 K/UL — SIGNIFICANT CHANGE UP (ref 0–0.5)
EOSINOPHIL NFR BLD AUTO: 0.1 % — SIGNIFICANT CHANGE UP (ref 0–6)
GLUCOSE SERPL-MCNC: 439 MG/DL — HIGH (ref 70–99)
HCT VFR BLD CALC: 33.9 % — LOW (ref 34.5–45)
HGB BLD-MCNC: 10.9 G/DL — LOW (ref 11.5–15.5)
IMM GRANULOCYTES NFR BLD AUTO: 0.4 % — SIGNIFICANT CHANGE UP (ref 0–1.5)
LYMPHOCYTES # BLD AUTO: 19 % — SIGNIFICANT CHANGE UP (ref 13–44)
LYMPHOCYTES # BLD AUTO: 2.9 K/UL — SIGNIFICANT CHANGE UP (ref 1–3.3)
MCHC RBC-ENTMCNC: 27.9 PG — SIGNIFICANT CHANGE UP (ref 27–34)
MCHC RBC-ENTMCNC: 32.2 GM/DL — SIGNIFICANT CHANGE UP (ref 32–36)
MCV RBC AUTO: 86.7 FL — SIGNIFICANT CHANGE UP (ref 80–100)
MONOCYTES # BLD AUTO: 1.39 K/UL — HIGH (ref 0–0.9)
MONOCYTES NFR BLD AUTO: 9.1 % — SIGNIFICANT CHANGE UP (ref 2–14)
NEUTROPHILS # BLD AUTO: 10.87 K/UL — HIGH (ref 1.8–7.4)
NEUTROPHILS NFR BLD AUTO: 71.1 % — SIGNIFICANT CHANGE UP (ref 43–77)
NRBC # BLD: 0 /100 WBCS — SIGNIFICANT CHANGE UP (ref 0–0)
PLATELET # BLD AUTO: 200 K/UL — SIGNIFICANT CHANGE UP (ref 150–400)
POTASSIUM SERPL-MCNC: 4.8 MMOL/L — SIGNIFICANT CHANGE UP (ref 3.5–5.3)
POTASSIUM SERPL-SCNC: 4.8 MMOL/L — SIGNIFICANT CHANGE UP (ref 3.5–5.3)
PROT SERPL-MCNC: 7.5 G/DL — SIGNIFICANT CHANGE UP (ref 6–8.3)
RBC # BLD: 3.91 M/UL — SIGNIFICANT CHANGE UP (ref 3.8–5.2)
RBC # FLD: 12.2 % — SIGNIFICANT CHANGE UP (ref 10.3–14.5)
SODIUM SERPL-SCNC: 131 MMOL/L — LOW (ref 135–145)
WBC # BLD: 15.28 K/UL — HIGH (ref 3.8–10.5)
WBC # FLD AUTO: 15.28 K/UL — HIGH (ref 3.8–10.5)

## 2019-11-26 PROCEDURE — 96374 THER/PROPH/DIAG INJ IV PUSH: CPT

## 2019-11-26 PROCEDURE — 80053 COMPREHEN METABOLIC PANEL: CPT

## 2019-11-26 PROCEDURE — 85027 COMPLETE CBC AUTOMATED: CPT

## 2019-11-26 PROCEDURE — 99284 EMERGENCY DEPT VISIT MOD MDM: CPT | Mod: 25

## 2019-11-26 PROCEDURE — 82962 GLUCOSE BLOOD TEST: CPT

## 2019-11-26 PROCEDURE — 99284 EMERGENCY DEPT VISIT MOD MDM: CPT

## 2019-11-26 PROCEDURE — 71046 X-RAY EXAM CHEST 2 VIEWS: CPT

## 2019-11-26 PROCEDURE — 71046 X-RAY EXAM CHEST 2 VIEWS: CPT | Mod: 26

## 2019-11-26 RX ORDER — ONDANSETRON 8 MG/1
4 TABLET, FILM COATED ORAL ONCE
Refills: 0 | Status: COMPLETED | OUTPATIENT
Start: 2019-11-26 | End: 2019-11-26

## 2019-11-26 RX ORDER — INSULIN LISPRO 100/ML
6 VIAL (ML) SUBCUTANEOUS ONCE
Refills: 0 | Status: COMPLETED | OUTPATIENT
Start: 2019-11-26 | End: 2019-11-26

## 2019-11-26 RX ORDER — KETOROLAC TROMETHAMINE 30 MG/ML
15 SYRINGE (ML) INJECTION ONCE
Refills: 0 | Status: DISCONTINUED | OUTPATIENT
Start: 2019-11-26 | End: 2019-11-26

## 2019-11-26 RX ORDER — LIDOCAINE 4 G/100G
1 CREAM TOPICAL ONCE
Refills: 0 | Status: COMPLETED | OUTPATIENT
Start: 2019-11-26 | End: 2019-11-26

## 2019-11-26 RX ADMIN — ONDANSETRON 4 MILLIGRAM(S): 8 TABLET, FILM COATED ORAL at 18:05

## 2019-11-26 RX ADMIN — Medication 15 MILLIGRAM(S): at 19:04

## 2019-11-26 RX ADMIN — Medication 15 MILLIGRAM(S): at 20:22

## 2019-11-26 RX ADMIN — LIDOCAINE 1 PATCH: 4 CREAM TOPICAL at 19:03

## 2019-11-26 RX ADMIN — LIDOCAINE 1 PATCH: 4 CREAM TOPICAL at 20:57

## 2019-11-26 RX ADMIN — Medication 6 UNIT(S): at 20:18

## 2019-11-26 NOTE — ED PROVIDER NOTE - PHYSICAL EXAMINATION
CONSTITUTIONAL: Patient is awake, alert and oriented x 3. Patient is well appearing and in no acute distress.  HEAD: NCAT,  NECK: supple,   LUNGS: CTA B/L,  HEART: RRR.+S1S2 no murmurs,   ABDOMEN: Soft nd/nt+bs no rebound or guarding.   EXTREMITY: no edema or calf tenderness b/l, FROM upper and lower ext b/l  SKIN: with no rash or lesions.   NEURO: No focal deficits CONSTITUTIONAL: Patient is awake, alert and oriented x 3. Patient is well appearing and in no acute distress.  HEAD: NCAT,  NECK: supple,   LUNGS: Left lower crackles otherwise, CTA B/L,  HEART: RRR.+S1S2 no murmurs,   ABDOMEN: Soft nd/nt+bs no rebound or guarding.   EXTREMITY: no edema or calf tenderness b/l, FROM upper and lower ext b/l  SKIN: with no rash or lesions.   NEURO: No focal deficits

## 2019-11-26 NOTE — ED PROVIDER NOTE - PROGRESS NOTE DETAILS
PT w/ LLL PNA on CXR. Glucose 439. Pt aware, states she hasn't had appetite so hasn't been taking meal time insulin. Normally takes lispro 15 w/ meals and 20 basaglar qhs. Today only took 15 w/ breakfast and has no lunch or dinner coverage. Discussed importance of frequent FS at home and coverage even if she doesn't eat full meal and doesn't give full 15 units. Pt states will provide coverage and check glucose more freq. pt prescribed Levaquin. Offered CDU for glucose monitoring and PNA treatment but pt prefers to be d/c home. Will provided strong return precautions  Jaida Castro PA-C

## 2019-11-26 NOTE — ED ADULT NURSE NOTE - OBJECTIVE STATEMENT
59y old female c/o chest pain, body aches, cough and fever x1 week. PMH asthma, sarcoidosis, DM, HTN. A&Ox3. Patient was in [place] and became sick with fever, cough and chills, patient came home on Saturday and became worse. Patient states she has abdominal pain, nausea, body aches, general weakness, productive cough and pain under breast tissue. Patient denies vomiting, SOB, dizziness, HA, burning and pain w/ urination. Crackles auscultated in left lower lung. Cardiac monitor in place. IV inserted by QDNADIA RN left arm.

## 2019-11-26 NOTE — ED PROVIDER NOTE - OBJECTIVE STATEMENT
59 year old female with pmhx asthma, sarcoidosis, DM, HLD, HTN, CAD w/ stents presents  to the ED c/o SOB and coughing x1 week. Patient reports she had onset of cough 1 week ago while in [facility]. Was seen at the doctor there and advised to take Tylenol which she has with minimal relief. [place] return to [place] ago and has had continued cough now noted to be more dry. She states she feels chest congestion but cant bring anything up. Patient also with sob and chest pain w/ coughing. She states she has felt tired and reports myalgias as well with nausea and one episode diarrhea a few days ago.  Her chest pain is not at all similar to pain she has had with stents, denies pleuritic or exertional pain. Denies fevers, chills, abd pain, vomiting, urinary symptoms. + Sick contacts in [place]

## 2019-11-26 NOTE — ED PROVIDER NOTE - ATTENDING CONTRIBUTION TO CARE
59y F hx of sarcoid, asthma not on steroids here with c/o productive cough x1 week associated with chills, subjective fevers, myalgias, arthralgias, now also endorsing L lateral chest wall pain along lower rib cage. No trauma. No leg swelling, No hx of VTE. Reports pain is worse with lying on affected side, worse with cough, palpation. On exam pt comfortable, no distress. RRR, L sided crackles. abd soft ntnd. No rashes. +chest wall TTP. Ext wwp. Suspect viral URI vs PNA. DO not suspect cardiac cause-EKG WNL and pain w cough and reproducible on exam. Do not suspect VTE despite recent travel. No hypoxia, no leg swelling, +prod cough w subj fever. Check labs, CXR, pain contol, re-eval.

## 2019-11-26 NOTE — ED ADULT NURSE REASSESSMENT NOTE - NS ED NURSE REASSESS COMMENT FT1
Patient lying in bed w/ cardiac monitor in place and family at bedside, patient reports pain is gone but feels she might be hungry. Patient lying in bed w/ cardiac monitor in place and family at bedside, patient reports pain is gone but feels she might be hungry. MD/PA made aware , PA spoke to patient, patient awaiting d/c.

## 2019-11-26 NOTE — ED PROVIDER NOTE - RAPID ASSESSMENT
**Pt seen in waiting room by Re Kent (JANEEN), documentation completed by Jose Elias Saez. Pt to be sent to main ED for further evaluation - all orders placed to be followed by MD in the main ED**     59 year old female with pmhx asthma, sarcoidosis, DM, HLD, essential HTN and pshx , coronary artery stent placement presents to the ED c/o SOB and coughing x1 week, worsening at night. +burning CP, generalized body pain, fever, sputum, nausea, abdominal pain, diarrhea. Notes that the chest pain is associated with the cough. Pt had sick contacts during her trip to [place], returned x3 days ago. Symptoms have worsened everyday since returning to the , pt decided to present to [facility] ED today for evaluation. Tried Mucinex and Tylenol with no symptomatic relief, last dose of Tylenol x1 hour ago. Denies burning urination, vomiting, night chills. Allergic to Penicillin. **Pt seen in waiting room by Re Kent (JANEEN), documentation completed by Jose Elias Saez. Pt to be sent to main ED for further evaluation - all orders placed to be followed by MD in the main ED**     59 year old female with pmhx asthma, sarcoidosis, DM, HLD, essential HTN and pshx , coronary artery stent placement 2 yrs agopresents to the ED c/o SOB and coughing x1 week, worsening at night. +burning CP, generalized body pain, fever, sputum production, nausea, abdominal pain, diarrhea. Notes that the chest pain is associated with the cough, is nonexertional, nonpleuritic, nonpositional and began before her trip back to the . Pt had sick contacts during her trip to [place], started to have sputum productive cough 7 days ago, followed by subjective fevers and aforementioned symptoms,  returned x3 days ago. Symptoms have worsened everyday since returning to the [place], pt decided to present to [facility] ED today for evaluation as symptoms have not improved. Tried Mucinex and Tylenol with no symptomatic relief, last dose of Tylenol x1 hour ago. Denies burning urination, vomiting, night chills. Allergic to Penicillin.

## 2019-11-26 NOTE — ED PROVIDER NOTE - NSFOLLOWUPINSTRUCTIONS_ED_ALL_ED_FT
1. Follow up with your PMD within 48-72 hours.    2. Rest, increase fluids. For pain take Tylenol 1g every 6 hours alternated with Motrin 400mg every 6 hours   3. Start Levaquin 1 tab daily for a total 1 week, first tab given in ED  4. Check you fingersticks frequently at home and provide insulin coverage   3. Return to ED for change of symptoms including worsening or continued cough, chest pain, fevers,  chills, weakness, difficulty breathing, high glucose and any other symptoms of concern

## 2019-11-26 NOTE — ED PROVIDER NOTE - PATIENT PORTAL LINK FT
You can access the FollowMyHealth Patient Portal offered by [facility] by registering at the following website: [email or web address]. By joining [employer]’s FollowMyHealth portal, you will also be able to view your health information using other applications (apps) compatible with our system.

## 2019-11-27 NOTE — DISCUSSION/SUMMARY
[FreeTextEntry1] : 59 year old female with pmhx asthma, sarcoidosis, DM, HLD, essential HTN and pshx , coronary artery stent placement 2 yrs ago presented to the ED c/o SOB and coughing x1 week, worsening at night. +burning CP, generalized body pain, fever, sputum production, nausea, abdominal pain, diarrhea. Notes that the chest pain is associated with the cough, is nonexertional, nonpleuritic, nonpositional and began before her trip back to the . Pt had sick contacts during her trip to [place], started to have sputum productive cough 7 days ago, followed by subjective fevers and aforementioned symptoms,  returned x3 days ago.\par \par Discharged home from ED

## 2019-12-16 ENCOUNTER — RX RENEWAL (OUTPATIENT)
Age: 59
End: 2019-12-16

## 2019-12-16 ENCOUNTER — APPOINTMENT (OUTPATIENT)
Dept: INTERNAL MEDICINE | Facility: CLINIC | Age: 59
End: 2019-12-16

## 2019-12-20 ENCOUNTER — APPOINTMENT (OUTPATIENT)
Dept: INTERNAL MEDICINE | Facility: CLINIC | Age: 59
End: 2019-12-20
Payer: COMMERCIAL

## 2019-12-20 VITALS
DIASTOLIC BLOOD PRESSURE: 71 MMHG | HEART RATE: 86 BPM | OXYGEN SATURATION: 98 % | SYSTOLIC BLOOD PRESSURE: 130 MMHG | BODY MASS INDEX: 31.89 KG/M2 | HEIGHT: 63 IN | WEIGHT: 180 LBS

## 2019-12-20 PROCEDURE — 99214 OFFICE O/P EST MOD 30 MIN: CPT

## 2019-12-23 LAB
ALBUMIN SERPL ELPH-MCNC: 4.6 G/DL
ALP BLD-CCNC: 67 U/L
ALT SERPL-CCNC: 24 U/L
ANION GAP SERPL CALC-SCNC: 16 MMOL/L
AST SERPL-CCNC: 25 U/L
BILIRUB SERPL-MCNC: 0.4 MG/DL
BUN SERPL-MCNC: 14 MG/DL
CALCIUM SERPL-MCNC: 10.4 MG/DL
CHLORIDE SERPL-SCNC: 97 MMOL/L
CHOLEST SERPL-MCNC: 159 MG/DL
CHOLEST/HDLC SERPL: 2.7 RATIO
CO2 SERPL-SCNC: 25 MMOL/L
CREAT SERPL-MCNC: 0.74 MG/DL
ESTIMATED AVERAGE GLUCOSE: 203 MG/DL
GLUCOSE SERPL-MCNC: 333 MG/DL
HBA1C MFR BLD HPLC: 8.7 %
HDLC SERPL-MCNC: 60 MG/DL
LDLC SERPL CALC-MCNC: 60 MG/DL
M TB IFN-G BLD-IMP: NEGATIVE
POTASSIUM SERPL-SCNC: 5 MMOL/L
PROT SERPL-MCNC: 7.1 G/DL
QUANTIFERON TB PLUS MITOGEN MINUS NIL: >10 IU/ML
QUANTIFERON TB PLUS NIL: 0.06 IU/ML
QUANTIFERON TB PLUS TB1 MINUS NIL: 0.01 IU/ML
QUANTIFERON TB PLUS TB2 MINUS NIL: 0 IU/ML
SODIUM SERPL-SCNC: 138 MMOL/L
TRIGL SERPL-MCNC: 196 MG/DL

## 2019-12-31 NOTE — ASSESSMENT
[FreeTextEntry1] : 1) had flu vacc through work.  2) needs quant gold and form completed.  Will arrange for form to be picked up when quant gold in.  3)  DM - A1C, cmp, lipids along w quant gold - may just have her leave regimen as is if A1C reasonable, as she had a lot of indiscretion for the weeks at family recently.  Stressed getting back to walking, lower carb diet, etc - on a fair amount of insulin.  ?role for GLP1 given the CAD, or SGLT2 - have to check on status of new insurance's formulary in terms of her insulin regimen and anything we add.  Reminded ophtho annually.  Microalb neg in May.  4) next cpe due 5/20, should come for one more f/u/A1C 3-4 mos from now.  Needs new FIT - given.

## 2019-12-31 NOTE — HISTORY OF PRESENT ILLNESS
[FreeTextEntry1] : Here to f/u visit to ER in Nov, and needs new MD (insurance change) and an employment form filled out.   [de-identified] : 60 y/o woman with DM, HTN, lipids, CAD s/p stenting, asthma/sarcoid s/p recent visit to ER after travelling back from seeing family in [place].  Had both GI and URI syx, and did have sick contacts before leaving.  All those syx are resolved.   \par \porter Has hx of unstable angina, RCA dz s/p stenting 2017, no longer on plavix.  On atorva 40 daily, b asa, metoprolol.  Has no anginal syx - tolerates all her activities well.  DM treated w basaglar/humalog, max metformin.  Has no polyuria, polydypsia.  States her fingersticks aren't great because of visit to family.  Ate pretty much what she wanted.  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  Has hx BCG vaccine, +PPD, negative quant gold - needs new one.

## 2019-12-31 NOTE — PHYSICAL EXAM
[Well Nourished] : well nourished [No Acute Distress] : no acute distress [No Respiratory Distress] : no respiratory distress  [Well-Appearing] : well-appearing [No JVD] : no jugular venous distention [Well Developed] : well developed [Clear to Auscultation] : lungs were clear to auscultation bilaterally [No Accessory Muscle Use] : no accessory muscle use [Normal Percussion] : the chest was normal to percussion [Regular Rhythm] : with a regular rhythm [Normal S1, S2] : normal S1 and S2 [Normal Rate] : normal rate  [No Carotid Bruits] : no carotid bruits [No Extremity Clubbing/Cyanosis] : no extremity clubbing/cyanosis [No Edema] : there was no peripheral edema [Non-distended] : non-distended [Soft] : abdomen soft [Non Tender] : non-tender [No HSM] : no HSM [Normal Bowel Sounds] : normal bowel sounds

## 2020-01-06 ENCOUNTER — MESSAGE (OUTPATIENT)
Age: 60
End: 2020-01-06

## 2020-03-11 ENCOUNTER — NON-APPOINTMENT (OUTPATIENT)
Age: 60
End: 2020-03-11

## 2020-03-11 ENCOUNTER — APPOINTMENT (OUTPATIENT)
Dept: OPHTHALMOLOGY | Facility: CLINIC | Age: 60
End: 2020-03-11
Payer: COMMERCIAL

## 2020-03-11 PROCEDURE — 92012 INTRM OPH EXAM EST PATIENT: CPT

## 2020-03-13 ENCOUNTER — NON-APPOINTMENT (OUTPATIENT)
Age: 60
End: 2020-03-13

## 2020-03-13 ENCOUNTER — APPOINTMENT (OUTPATIENT)
Dept: CARDIOLOGY | Facility: CLINIC | Age: 60
End: 2020-03-13
Payer: COMMERCIAL

## 2020-03-13 VITALS
OXYGEN SATURATION: 100 % | BODY MASS INDEX: 31.53 KG/M2 | DIASTOLIC BLOOD PRESSURE: 75 MMHG | HEART RATE: 73 BPM | SYSTOLIC BLOOD PRESSURE: 115 MMHG | WEIGHT: 178 LBS

## 2020-03-13 PROCEDURE — 93000 ELECTROCARDIOGRAM COMPLETE: CPT

## 2020-03-13 PROCEDURE — 99214 OFFICE O/P EST MOD 30 MIN: CPT

## 2020-03-16 ENCOUNTER — APPOINTMENT (OUTPATIENT)
Dept: CARDIOLOGY | Facility: CLINIC | Age: 60
End: 2020-03-16

## 2020-03-22 NOTE — HISTORY OF PRESENT ILLNESS
[FreeTextEntry1] :  59 year old female with HTN controlled on meds, HLD stable on statins s/p episode of UA and cath with stent of RCA.  Pt feeling well since stent without CP, SOB or H/A

## 2020-03-22 NOTE — REVIEW OF SYSTEMS
[address] [id number] [phone]      Sarah Baldwin    1971    Sarah Baldwin has a diagnosis of PTSD determined by Psychiatry.  She has poor sleep interrupted by nightmares.  These symptoms are well controlled by Prazosin 3 mg po QHS.  She has failed trazodone, and remeron, gabapentin, lamictal and has been on this medication for over 5 years with great success.  Please cover this medication for her this year to avoid costly mental health hospitalization.            CRISSY HUDSON MD        February 13, 2017    
[Negative] : Heme/Lymph

## 2020-04-06 ENCOUNTER — APPOINTMENT (OUTPATIENT)
Dept: INTERNAL MEDICINE | Facility: CLINIC | Age: 60
End: 2020-04-06

## 2020-06-12 ENCOUNTER — OUTPATIENT (OUTPATIENT)
Dept: OUTPATIENT SERVICES | Facility: HOSPITAL | Age: 60
LOS: 1 days | End: 2020-06-12
Payer: MEDICAID

## 2020-06-12 ENCOUNTER — APPOINTMENT (OUTPATIENT)
Dept: INTERNAL MEDICINE | Facility: CLINIC | Age: 60
End: 2020-06-12

## 2020-06-12 DIAGNOSIS — Z98.89 OTHER SPECIFIED POSTPROCEDURAL STATES: Chronic | ICD-10-CM

## 2020-06-12 DIAGNOSIS — I10 ESSENTIAL (PRIMARY) HYPERTENSION: ICD-10-CM

## 2020-06-12 DIAGNOSIS — E11.9 TYPE 2 DIABETES MELLITUS WITHOUT COMPLICATIONS: ICD-10-CM

## 2020-06-12 DIAGNOSIS — I25.10 ATHEROSCLEROTIC HEART DISEASE OF NATIVE CORONARY ARTERY WITHOUT ANGINA PECTORIS: ICD-10-CM

## 2020-06-12 RX ORDER — INSULIN LISPRO 100 [IU]/ML
100 INJECTION, SOLUTION INTRAVENOUS; SUBCUTANEOUS
Qty: 1 | Refills: 5 | Status: DISCONTINUED | COMMUNITY
Start: 2020-01-06 | End: 2020-06-12

## 2020-06-12 RX ORDER — INSULIN GLARGINE 100 [IU]/ML
100 INJECTION, SOLUTION SUBCUTANEOUS
Qty: 2 | Refills: 5 | Status: DISCONTINUED | COMMUNITY
Start: 2019-01-28 | End: 2020-06-12

## 2020-06-12 RX ORDER — INSULIN GLARGINE 100 [IU]/ML
100 INJECTION, SOLUTION SUBCUTANEOUS
Qty: 3 | Refills: 1 | Status: DISCONTINUED | COMMUNITY
Start: 2020-06-12 | End: 2020-06-12

## 2020-06-12 RX ORDER — INSULIN LISPRO 100 [IU]/ML
100 INJECTION, SOLUTION INTRAVENOUS; SUBCUTANEOUS
Qty: 2 | Refills: 1 | Status: DISCONTINUED | COMMUNITY
Start: 2020-06-12 | End: 2020-06-12

## 2020-06-12 RX ORDER — PEN NEEDLE, DIABETIC 29 G X1/2"
32G X 4 MM NEEDLE, DISPOSABLE MISCELLANEOUS
Qty: 1 | Refills: 4 | Status: DISCONTINUED | COMMUNITY
Start: 2018-08-29 | End: 2020-06-12

## 2020-06-17 ENCOUNTER — LABORATORY RESULT (OUTPATIENT)
Age: 60
End: 2020-06-17

## 2020-06-17 ENCOUNTER — OUTPATIENT (OUTPATIENT)
Dept: OUTPATIENT SERVICES | Facility: HOSPITAL | Age: 60
LOS: 1 days | End: 2020-06-17
Payer: MEDICAID

## 2020-06-17 ENCOUNTER — APPOINTMENT (OUTPATIENT)
Dept: INTERNAL MEDICINE | Facility: CLINIC | Age: 60
End: 2020-06-17
Payer: COMMERCIAL

## 2020-06-17 VITALS
SYSTOLIC BLOOD PRESSURE: 118 MMHG | HEART RATE: 72 BPM | TEMPERATURE: 98.1 F | DIASTOLIC BLOOD PRESSURE: 70 MMHG | RESPIRATION RATE: 18 BRPM

## 2020-06-17 DIAGNOSIS — Z98.89 OTHER SPECIFIED POSTPROCEDURAL STATES: Chronic | ICD-10-CM

## 2020-06-17 DIAGNOSIS — N63.0 UNSPECIFIED LUMP IN UNSPECIFIED BREAST: ICD-10-CM

## 2020-06-17 DIAGNOSIS — E11.9 TYPE 2 DIABETES MELLITUS WITHOUT COMPLICATIONS: ICD-10-CM

## 2020-06-17 PROCEDURE — G0463: CPT

## 2020-06-17 PROCEDURE — 82043 UR ALBUMIN QUANTITATIVE: CPT

## 2020-06-17 PROCEDURE — 83036 HEMOGLOBIN GLYCOSYLATED A1C: CPT

## 2020-06-17 PROCEDURE — 80053 COMPREHEN METABOLIC PANEL: CPT

## 2020-06-17 PROCEDURE — 99213 OFFICE O/P EST LOW 20 MIN: CPT | Mod: GE

## 2020-06-17 PROCEDURE — 85027 COMPLETE CBC AUTOMATED: CPT

## 2020-06-18 LAB
A1C WITH ESTIMATED AVERAGE GLUCOSE RESULT: 9.5 % — HIGH (ref 4–5.6)
ALBUMIN SERPL ELPH-MCNC: 4.8 G/DL — SIGNIFICANT CHANGE UP (ref 3.3–5)
ALP SERPL-CCNC: 71 U/L — SIGNIFICANT CHANGE UP (ref 40–120)
ALT FLD-CCNC: 32 U/L — SIGNIFICANT CHANGE UP (ref 10–45)
ANION GAP SERPL CALC-SCNC: 12 MMOL/L — SIGNIFICANT CHANGE UP (ref 5–17)
AST SERPL-CCNC: 29 U/L — SIGNIFICANT CHANGE UP (ref 10–40)
BILIRUB SERPL-MCNC: 0.4 MG/DL — SIGNIFICANT CHANGE UP (ref 0.2–1.2)
BUN SERPL-MCNC: 11 MG/DL — SIGNIFICANT CHANGE UP (ref 7–23)
CALCIUM SERPL-MCNC: 10.1 MG/DL — SIGNIFICANT CHANGE UP (ref 8.4–10.5)
CHLORIDE SERPL-SCNC: 98 MMOL/L — SIGNIFICANT CHANGE UP (ref 96–108)
CO2 SERPL-SCNC: 25 MMOL/L — SIGNIFICANT CHANGE UP (ref 22–31)
CREAT ?TM UR-MCNC: 148 MG/DL — SIGNIFICANT CHANGE UP
CREAT SERPL-MCNC: 0.75 MG/DL — SIGNIFICANT CHANGE UP (ref 0.5–1.3)
ESTIMATED AVERAGE GLUCOSE: 226 MG/DL — HIGH (ref 68–114)
GLUCOSE SERPL-MCNC: 293 MG/DL — HIGH (ref 70–99)
HCT VFR BLD CALC: 39.4 % — SIGNIFICANT CHANGE UP (ref 34.5–45)
HGB BLD-MCNC: 11.8 G/DL — SIGNIFICANT CHANGE UP (ref 11.5–15.5)
MCHC RBC-ENTMCNC: 28.1 PG — SIGNIFICANT CHANGE UP (ref 27–34)
MCHC RBC-ENTMCNC: 29.9 GM/DL — LOW (ref 32–36)
MCV RBC AUTO: 93.8 FL — SIGNIFICANT CHANGE UP (ref 80–100)
MICROALBUMIN UR-MCNC: <1.2 MG/DL — SIGNIFICANT CHANGE UP
MICROALBUMIN/CREAT UR-RTO: SIGNIFICANT CHANGE UP MG/G (ref 0–30)
PLATELET # BLD AUTO: 236 K/UL — SIGNIFICANT CHANGE UP (ref 150–400)
POTASSIUM SERPL-MCNC: 5.4 MMOL/L — HIGH (ref 3.5–5.3)
POTASSIUM SERPL-SCNC: 5.4 MMOL/L — HIGH (ref 3.5–5.3)
PROT SERPL-MCNC: 7 G/DL — SIGNIFICANT CHANGE UP (ref 6–8.3)
RBC # BLD: 4.2 M/UL — SIGNIFICANT CHANGE UP (ref 3.8–5.2)
RBC # FLD: 12.6 % — SIGNIFICANT CHANGE UP (ref 10.3–14.5)
SODIUM SERPL-SCNC: 136 MMOL/L — SIGNIFICANT CHANGE UP (ref 135–145)
WBC # BLD: 7.22 K/UL — SIGNIFICANT CHANGE UP (ref 3.8–10.5)
WBC # FLD AUTO: 7.22 K/UL — SIGNIFICANT CHANGE UP (ref 3.8–10.5)

## 2020-06-25 NOTE — HEALTH RISK ASSESSMENT
[Yes] : Yes [Monthly or less (1 pt)] : Monthly or less (1 point) [1 or 2 (0 pts)] : 1 or 2 (0 points) [Never (0 pts)] : Never (0 points) [No] : In the past 12 months have you used drugs other than those required for medical reasons? No [No falls in past year] : Patient reported no falls in the past year [] : No [Audit-CScore] : 2 [de-identified] : none [de-identified] : snacks frequently. Mostly vegetables and fruits

## 2020-06-25 NOTE — ASSESSMENT
[FreeTextEntry1] : 60 y/o woman with DM, HTN, HLD, CAD s/p stenting, asthma/sarcoid presenting for diabetes follow up and physical.

## 2020-06-25 NOTE — PHYSICAL EXAM
[No Acute Distress] : no acute distress [Well Nourished] : well nourished [Well Developed] : well developed [Normal Sclera/Conjunctiva] : normal sclera/conjunctiva [PERRL] : pupils equal round and reactive to light [EOMI] : extraocular movements intact [No JVD] : no jugular venous distention [Supple] : supple [No Respiratory Distress] : no respiratory distress  [No Accessory Muscle Use] : no accessory muscle use [Clear to Auscultation] : lungs were clear to auscultation bilaterally [Normal Rate] : normal rate  [Regular Rhythm] : with a regular rhythm [Normal S1, S2] : normal S1 and S2 [Pedal Pulses Present] : the pedal pulses are present [No Edema] : there was no peripheral edema [Soft] : abdomen soft [Non Tender] : non-tender [Non-distended] : non-distended [Normal Bowel Sounds] : normal bowel sounds [No Rash] : no rash [Coordination Grossly Intact] : coordination grossly intact [No Focal Deficits] : no focal deficits [Normal Affect] : the affect was normal [Normal Insight/Judgement] : insight and judgment were intact [de-identified] : monofilament test bilateral feet normal

## 2020-06-25 NOTE — HISTORY OF PRESENT ILLNESS
[FreeTextEntry1] : follow up  [de-identified] : 58 y/o woman with DM, HTN, HLD, CAD s/p stenting, asthma/sarcoid presenting for diabetes follow up and physical. No acute complaints. She denies fevers, chills, SOB, CP, or diarrhea. Patient has not been checking BS since she ran out of stripes. She plans to pick them up this week. She takes Admelog 15U before meals and Basaglar 20U AM and PM. Patient occasionally skips breakfast and knos to hold her premeal insulin. Last A1c 8.7 Dec 2019. Seen the opthomologist this year and was told her exam was normal but has no seen a podiatries in the last year. She denies blurry vision, numbness, or tingling or weakness. \par \par No hx of colonscopy. Had FIT test few years ago that was negative. Last Pap was 2016 and was normal. Mammogram 2019 showed stable 12mm mass since 2014.\par \par Exercise: none except eduar labor from work\par Diet: more fruits and vegetables, snacking frequently, no soda, rarely carbs (rice)\par Social: work in [employer] over weekend, and . Lives at home with . Full ADLs

## 2020-06-25 NOTE — PLAN
[FreeTextEntry1] : \par DM\par -last optho 03/2020\par -podiatry referral\par -f/u A1c and micoalbumin/Cr\par -cw with Ademlog 15U TID premeals and Basglar 20U BID. Advised to keep log of sugars in order to adjust insulin. \par -cw metformin and lisinopril \par \par HCM\par -mammogram and ultrasound referral to assess for 12mm breast mass\par -GI referral for colonoscopy \par -f/u CBC, CMP. Deferred lipid panel since last one was 12/2019. Already on statin \par -Pap smear due 2021\par \par RTC 6 months for diabetes follow up

## 2020-07-07 ENCOUNTER — APPOINTMENT (OUTPATIENT)
Dept: OBGYN | Facility: CLINIC | Age: 60
End: 2020-07-07
Payer: COMMERCIAL

## 2020-07-07 ENCOUNTER — LABORATORY RESULT (OUTPATIENT)
Age: 60
End: 2020-07-07

## 2020-07-07 ENCOUNTER — OUTPATIENT (OUTPATIENT)
Dept: OUTPATIENT SERVICES | Facility: HOSPITAL | Age: 60
LOS: 1 days | End: 2020-07-07
Payer: MEDICAID

## 2020-07-07 VITALS — WEIGHT: 176 LBS | BODY MASS INDEX: 31.18 KG/M2 | SYSTOLIC BLOOD PRESSURE: 120 MMHG | DIASTOLIC BLOOD PRESSURE: 74 MMHG

## 2020-07-07 DIAGNOSIS — Z98.89 OTHER SPECIFIED POSTPROCEDURAL STATES: Chronic | ICD-10-CM

## 2020-07-07 DIAGNOSIS — Z87.42 PERSONAL HISTORY OF OTHER DISEASES OF THE FEMALE GENITAL TRACT: ICD-10-CM

## 2020-07-07 DIAGNOSIS — N76.0 ACUTE VAGINITIS: ICD-10-CM

## 2020-07-07 PROCEDURE — 87624 HPV HI-RISK TYP POOLED RSLT: CPT

## 2020-07-07 PROCEDURE — G0463: CPT

## 2020-07-07 PROCEDURE — 99396 PREV VISIT EST AGE 40-64: CPT | Mod: NC

## 2020-07-07 RX ORDER — INSULIN LISPRO 100 U/ML
100 INJECTION, SOLUTION INTRAVENOUS; SUBCUTANEOUS
Qty: 3 | Refills: 1 | Status: DISCONTINUED | COMMUNITY
Start: 2020-06-12 | End: 2020-07-07

## 2020-07-08 LAB — HPV HIGH+LOW RISK DNA PNL CVX: SIGNIFICANT CHANGE UP

## 2020-07-09 DIAGNOSIS — N95.1 MENOPAUSAL AND FEMALE CLIMACTERIC STATES: ICD-10-CM

## 2020-07-09 DIAGNOSIS — Z01.419 ENCOUNTER FOR GYNECOLOGICAL EXAMINATION (GENERAL) (ROUTINE) WITHOUT ABNORMAL FINDINGS: ICD-10-CM

## 2020-07-09 DIAGNOSIS — Z87.42 PERSONAL HISTORY OF OTHER DISEASES OF THE FEMALE GENITAL TRACT: ICD-10-CM

## 2020-07-10 LAB — CYTOLOGY SPEC DOC CYTO: SIGNIFICANT CHANGE UP

## 2020-07-13 NOTE — HISTORY OF PRESENT ILLNESS
[1 Year Ago] : 1 year ago [Good] : being in good health [Healthy Diet] : a healthy diet [Regular Exercise] : regular exercise [Experiencing Menopausal Sxs] : experiencing menopausal symptoms [Post-Menopause, No Sxs] : post-menopausal, currently without symptoms [Weight Concerns] : no concerns with her weight [Sexually Active] : is not sexually active

## 2020-07-13 NOTE — PHYSICAL EXAM
[Awake] : awake [Alert] : alert [Soft] : soft [Oriented x3] : oriented to person, place, and time [Normal] : uterus [Atrophy] : atrophy [No Bleeding] : there was no active vaginal bleeding [Dry Mucosa] : dry mucosa [Pap Obtained] : a Pap smear was performed [Uterine Adnexae] : were not tender and not enlarged [Acute Distress] : no acute distress [Mass] : no breast mass [Nipple Discharge] : no nipple discharge [Axillary LAD] : no axillary lymphadenopathy [Tender] : non tender [Motion Tenderness] : there was no cervical motion tenderness

## 2020-07-24 NOTE — ADDENDUM
[FreeTextEntry1] : I personally discussed this patient with the Resident at the time of the visit.  I agree with the assessment and plan as written, unless noted below.\par \par Attesting Faculty:  Radha Schuster MD\par \par

## 2020-07-24 NOTE — HISTORY OF PRESENT ILLNESS
[Home] : at home, [unfilled] , at the time of the visit. [Other Location: e.g. Home (Enter Location, City,State)___] : at [unfilled] [Verbal consent obtained from patient] : the patient, [unfilled] [FreeTextEntry8] : 58 y/o woman with DM, HTN, lipids, CAD s/p stenting, asthma/sarcoid s/p calling about the need to change her medications due to insurance no longer covering insulin Kwikpens. She needed the diabetic medications to be switched to generic solution, and syringe usage. She notes she lost her job and insurance coverage due to the pandemic, and that the Kwikpens were costing up to 800$. Thus she said she needed to go back to self drawing and self injecting by syringe. Patient notes she is comfortable with this as she has "dealt with diabetes for a long time." Patient Notes her recent sugar control has been good, and she feels well, although she hasn’t checked her fingersticks recently. Eating low carb diet and being adherant to insulin usage. Denied hypoglycemic symptoms. Of note she has CPE schedule in office for wednesday in 5 days.\par \par #Diabetes: ON lantus 20 in AM and 20 in PM. ON Humalog 15/15/15-20U with meals. Check A1c on Wed in office. Medications sent to pharmacy. Generic, lantus, humalog and syringes. Patient comfortable with self injecting and familiar with syringes.\par \par #CPE: in patient physical for wednesday. \par \par \par ADDENDUM: After discussion with pharmacist, Basaglar pen and Admelog vials are covered by insurance. Scripts sent to retail and confirmed with phamacist at [facility].\par \par Randy Costello\par PGY-1\par 15 mins spent.

## 2020-08-18 ENCOUNTER — NON-APPOINTMENT (OUTPATIENT)
Age: 60
End: 2020-08-18

## 2020-08-18 ENCOUNTER — APPOINTMENT (OUTPATIENT)
Dept: OPHTHALMOLOGY | Facility: CLINIC | Age: 60
End: 2020-08-18
Payer: COMMERCIAL

## 2020-08-18 PROCEDURE — 92014 COMPRE OPH EXAM EST PT 1/>: CPT

## 2020-08-25 ENCOUNTER — RESULT REVIEW (OUTPATIENT)
Age: 60
End: 2020-08-25

## 2020-08-25 ENCOUNTER — APPOINTMENT (OUTPATIENT)
Dept: MAMMOGRAPHY | Facility: IMAGING CENTER | Age: 60
End: 2020-08-25
Payer: COMMERCIAL

## 2020-08-25 ENCOUNTER — APPOINTMENT (OUTPATIENT)
Dept: ULTRASOUND IMAGING | Facility: IMAGING CENTER | Age: 60
End: 2020-08-25
Payer: COMMERCIAL

## 2020-08-25 ENCOUNTER — OUTPATIENT (OUTPATIENT)
Dept: OUTPATIENT SERVICES | Facility: HOSPITAL | Age: 60
LOS: 1 days | End: 2020-08-25
Payer: MEDICAID

## 2020-08-25 DIAGNOSIS — Z00.00 ENCOUNTER FOR GENERAL ADULT MEDICAL EXAMINATION WITHOUT ABNORMAL FINDINGS: ICD-10-CM

## 2020-08-25 DIAGNOSIS — Z87.42 PERSONAL HISTORY OF OTHER DISEASES OF THE FEMALE GENITAL TRACT: ICD-10-CM

## 2020-08-25 DIAGNOSIS — Z98.89 OTHER SPECIFIED POSTPROCEDURAL STATES: Chronic | ICD-10-CM

## 2020-08-25 DIAGNOSIS — N63.0 UNSPECIFIED LUMP IN UNSPECIFIED BREAST: ICD-10-CM

## 2020-08-25 PROCEDURE — 77067 SCR MAMMO BI INCL CAD: CPT | Mod: 26

## 2020-08-25 PROCEDURE — 76856 US EXAM PELVIC COMPLETE: CPT | Mod: 26

## 2020-08-25 PROCEDURE — 77063 BREAST TOMOSYNTHESIS BI: CPT | Mod: 26

## 2020-08-25 PROCEDURE — 76830 TRANSVAGINAL US NON-OB: CPT

## 2020-08-25 PROCEDURE — 76830 TRANSVAGINAL US NON-OB: CPT | Mod: 26

## 2020-08-25 PROCEDURE — 76641 ULTRASOUND BREAST COMPLETE: CPT | Mod: 26,50

## 2020-08-25 PROCEDURE — 76856 US EXAM PELVIC COMPLETE: CPT

## 2020-08-25 PROCEDURE — 76641 ULTRASOUND BREAST COMPLETE: CPT

## 2020-08-25 PROCEDURE — 77067 SCR MAMMO BI INCL CAD: CPT

## 2020-08-25 PROCEDURE — 77063 BREAST TOMOSYNTHESIS BI: CPT

## 2020-08-27 DIAGNOSIS — I10 ESSENTIAL (PRIMARY) HYPERTENSION: ICD-10-CM

## 2020-09-14 ENCOUNTER — APPOINTMENT (OUTPATIENT)
Dept: CARDIOLOGY | Facility: CLINIC | Age: 60
End: 2020-09-14
Payer: COMMERCIAL

## 2020-09-14 ENCOUNTER — NON-APPOINTMENT (OUTPATIENT)
Age: 60
End: 2020-09-14

## 2020-09-14 VITALS
WEIGHT: 176 LBS | HEART RATE: 78 BPM | SYSTOLIC BLOOD PRESSURE: 115 MMHG | DIASTOLIC BLOOD PRESSURE: 80 MMHG | OXYGEN SATURATION: 99 % | HEIGHT: 63 IN | BODY MASS INDEX: 31.18 KG/M2

## 2020-09-14 PROCEDURE — 93000 ELECTROCARDIOGRAM COMPLETE: CPT

## 2020-09-14 PROCEDURE — 99214 OFFICE O/P EST MOD 30 MIN: CPT

## 2020-09-16 ENCOUNTER — APPOINTMENT (OUTPATIENT)
Dept: OPHTHALMOLOGY | Facility: CLINIC | Age: 60
End: 2020-09-16

## 2020-09-30 NOTE — PHYSICAL EXAM
[General Appearance - Well Developed] : well developed [Normal Appearance] : normal appearance [Well Groomed] : well groomed [General Appearance - Well Nourished] : well nourished [General Appearance - In No Acute Distress] : no acute distress [No Deformities] : no deformities [Normal Conjunctiva] : the conjunctiva exhibited no abnormalities [Eyelids - No Xanthelasma] : the eyelids demonstrated no xanthelasmas [Normal Oral Mucosa] : normal oral mucosa [No Oral Pallor] : no oral pallor [No Oral Cyanosis] : no oral cyanosis [Normal Jugular Venous A Waves Present] : normal jugular venous A waves present [Normal Jugular Venous V Waves Present] : normal jugular venous V waves present [No Jugular Venous Early A Waves] : no jugular venous early A waves [Respiration, Rhythm And Depth] : normal respiratory rhythm and effort [Exaggerated Use Of Accessory Muscles For Inspiration] : no accessory muscle use [Auscultation Breath Sounds / Voice Sounds] : lungs were clear to auscultation bilaterally [Heart Rate And Rhythm] : heart rate and rhythm were normal [Heart Sounds] : normal S1 and S2 [Murmurs] : no murmurs present [Abdomen Soft] : soft [Abdomen Tenderness] : non-tender [Abdomen Mass (___ Cm)] : no abdominal mass palpated [Gait - Sufficient For Exercise Testing] : the gait was sufficient for exercise testing [Abnormal Walk] : normal gait [Cyanosis, Localized] : no localized cyanosis [Nail Clubbing] : no clubbing of the fingernails [Petechial Hemorrhages (___cm)] : no petechial hemorrhages [] : no rash [Skin Color & Pigmentation] : normal skin color and pigmentation [No Venous Stasis] : no venous stasis [Skin Lesions] : no skin lesions [No Skin Ulcers] : no skin ulcer [No Xanthoma] : no  xanthoma was observed [Oriented To Time, Place, And Person] : oriented to person, place, and time [Affect] : the affect was normal [Mood] : the mood was normal [No Anxiety] : not feeling anxious

## 2020-10-05 ENCOUNTER — OUTPATIENT (OUTPATIENT)
Dept: OUTPATIENT SERVICES | Facility: HOSPITAL | Age: 60
LOS: 1 days | End: 2020-10-05
Payer: MEDICAID

## 2020-10-05 ENCOUNTER — APPOINTMENT (OUTPATIENT)
Dept: PODIATRY | Facility: HOSPITAL | Age: 60
End: 2020-10-05
Payer: COMMERCIAL

## 2020-10-05 VITALS
TEMPERATURE: 97.3 F | WEIGHT: 176 LBS | DIASTOLIC BLOOD PRESSURE: 73 MMHG | HEIGHT: 63 IN | SYSTOLIC BLOOD PRESSURE: 153 MMHG | BODY MASS INDEX: 31.18 KG/M2 | HEART RATE: 75 BPM

## 2020-10-05 DIAGNOSIS — M79.609 PAIN IN UNSPECIFIED LIMB: ICD-10-CM

## 2020-10-05 DIAGNOSIS — M77.41 METATARSALGIA, RIGHT FOOT: ICD-10-CM

## 2020-10-05 DIAGNOSIS — Z98.89 OTHER SPECIFIED POSTPROCEDURAL STATES: Chronic | ICD-10-CM

## 2020-10-05 DIAGNOSIS — E11.9 TYPE 2 DIABETES MELLITUS WITHOUT COMPLICATIONS: ICD-10-CM

## 2020-10-05 PROCEDURE — 99212 OFFICE O/P EST SF 10 MIN: CPT

## 2020-10-05 PROCEDURE — G0463: CPT

## 2020-10-05 NOTE — HISTORY OF PRESENT ILLNESS
Follow with PCP   [FreeTextEntry1] : Ms. Winchester is a 58yo Diabetic who presents w/ bilateral foot pain. She states that she is a , and on her feet all day, and gets pain in at her heel and the ball of both her feet often. Today, she notes there is no pain. She has not tried prior treatments for this pain. She mentions she has had a podiatrist in the past who used to debride her nails, but prefers to take care of them herself. She denies a history of wounds. Denies N/V/F/Sob

## 2020-10-05 NOTE — PHYSICAL EXAM
[Skin Color & Pigmentation] : normal skin color and pigmentation [2+] : left foot dorsalis pedis 2+ [de-identified] : rectus foot type, no pain on palpation appreciated

## 2020-10-05 NOTE — ASSESSMENT
[FreeTextEntry1] : Pt is 58 yo type II diabetic who presents w/ b/l foot pain\par -She was seen and examined\par -Pt educated on diabetic foot care and the important of daily foot checks, not walking barefoot\par -Pt instructed to purchase OTC orthotics, dispensed powerstep pre-fabricated orthotic handout, w/ instruction to purchase online or at a medical device store\par -Pt to be reappointed in 3 months

## 2020-10-27 ENCOUNTER — OUTPATIENT (OUTPATIENT)
Dept: OUTPATIENT SERVICES | Facility: HOSPITAL | Age: 60
LOS: 1 days | End: 2020-10-27
Payer: MEDICAID

## 2020-10-27 ENCOUNTER — APPOINTMENT (OUTPATIENT)
Dept: GASTROENTEROLOGY | Facility: HOSPITAL | Age: 60
End: 2020-10-27
Payer: COMMERCIAL

## 2020-10-27 VITALS
DIASTOLIC BLOOD PRESSURE: 66 MMHG | HEART RATE: 85 BPM | HEIGHT: 63 IN | SYSTOLIC BLOOD PRESSURE: 127 MMHG | WEIGHT: 176 LBS | TEMPERATURE: 97.4 F | BODY MASS INDEX: 31.18 KG/M2

## 2020-10-27 DIAGNOSIS — Z87.09 PERSONAL HISTORY OF OTHER DISEASES OF THE RESPIRATORY SYSTEM: ICD-10-CM

## 2020-10-27 DIAGNOSIS — Z98.89 OTHER SPECIFIED POSTPROCEDURAL STATES: Chronic | ICD-10-CM

## 2020-10-27 DIAGNOSIS — R10.13 EPIGASTRIC PAIN: ICD-10-CM

## 2020-10-27 DIAGNOSIS — R10.9 UNSPECIFIED ABDOMINAL PAIN: ICD-10-CM

## 2020-10-27 DIAGNOSIS — Z12.11 ENCOUNTER FOR SCREENING FOR MALIGNANT NEOPLASM OF COLON: ICD-10-CM

## 2020-10-27 DIAGNOSIS — Z00.00 ENCOUNTER FOR GENERAL ADULT MEDICAL EXAMINATION W/OUT ABNORMAL FINDINGS: ICD-10-CM

## 2020-10-27 DIAGNOSIS — E66.9 OBESITY, UNSPECIFIED: ICD-10-CM

## 2020-10-27 PROCEDURE — G0463: CPT

## 2020-10-27 PROCEDURE — 99203 OFFICE O/P NEW LOW 30 MIN: CPT | Mod: GC

## 2020-10-27 NOTE — ASSESSMENT
[FreeTextEntry1] : 60 yo F w/ PMHx DM, HTN, HLD, CAD s/p stenting on ASA\par \par # colon cancer screening - average risk. Normocytic anemia stable, will not pursue EGD at this time after discussion with patient. \par - schedule colonoscopy with golytely prep\par - patient to see medicine 11/3, will discuss insulin dosing for procedure with them\par - RTC after colonoscopy\par \par Rene Olivarez, PGY4

## 2020-10-27 NOTE — HISTORY OF PRESENT ILLNESS
[de-identified] : 60 yo F w/ PMhx DM, HTN, HLD, CAD s/p stenting, asthma presenting for colon cancer screening. \par \par She states that she had colonoscopy 10+ years ago at O[facility], report normal. Had FIT a few years ago negative. No FHx of GI malignancy. Had intentional 8 lb weight loss over past several months. Having 2-3 formed bowel movements per day, no abd pain. No n/v, no early satiety. \par \par On aspirin, no other anticoagulation. \par \par The father has diabetes.  There is no other family history of colon cancer, colon polyp, peptic ulcer disease, female genitourinary disease, liver disease, cholelithiasis, pancreatic disease or cancer, inflammatory bowel disease or celiac disease.

## 2020-10-27 NOTE — END OF VISIT
[Time Spent: ___ minutes] : I have spent [unfilled] minutes of time on the encounter. [>50% of the face to face encounter time was spent on counseling and/or coordination of care for ___] : Greater than 50% of the face to face encounter time was spent on counseling and/or coordination of care for [unfilled] [] : Fellow [FreeTextEntry3] : As modified and discussed with patient\par MD MISSAEL Ashford F[facility]Well[facility]\par Associate Professor of Medicine\par [facility] [employer]\par

## 2020-10-27 NOTE — PHYSICAL EXAM
[General Appearance - Alert] : alert [General Appearance - In No Acute Distress] : in no acute distress [Sclera] : the sclera and conjunctiva were normal [Outer Ear] : the ears and nose were normal in appearance [Neck Appearance] : the appearance of the neck was normal [] : no respiratory distress [Apical Impulse] : the apical impulse was normal [Edema] : there was no peripheral edema [FreeTextEntry1] : soft, nontender, no r/g

## 2020-11-03 ENCOUNTER — APPOINTMENT (OUTPATIENT)
Dept: INTERNAL MEDICINE | Facility: CLINIC | Age: 60
End: 2020-11-03
Payer: COMMERCIAL

## 2020-11-03 ENCOUNTER — NON-APPOINTMENT (OUTPATIENT)
Age: 60
End: 2020-11-03

## 2020-11-03 ENCOUNTER — OUTPATIENT (OUTPATIENT)
Dept: OUTPATIENT SERVICES | Facility: HOSPITAL | Age: 60
LOS: 1 days | End: 2020-11-03
Payer: MEDICAID

## 2020-11-03 VITALS
BODY MASS INDEX: 31.71 KG/M2 | DIASTOLIC BLOOD PRESSURE: 60 MMHG | HEIGHT: 63 IN | WEIGHT: 179 LBS | SYSTOLIC BLOOD PRESSURE: 114 MMHG

## 2020-11-03 DIAGNOSIS — I10 ESSENTIAL (PRIMARY) HYPERTENSION: ICD-10-CM

## 2020-11-03 DIAGNOSIS — Z98.89 OTHER SPECIFIED POSTPROCEDURAL STATES: Chronic | ICD-10-CM

## 2020-11-03 PROCEDURE — G0463: CPT

## 2020-11-03 PROCEDURE — 99213 OFFICE O/P EST LOW 20 MIN: CPT | Mod: GE

## 2020-11-03 RX ORDER — INSULIN LISPRO 100 [IU]/ML
100 INJECTION, SOLUTION INTRAVENOUS; SUBCUTANEOUS
Qty: 5 | Refills: 3 | Status: DISCONTINUED | COMMUNITY
Start: 2020-07-07 | End: 2020-11-03

## 2020-11-11 DIAGNOSIS — E11.9 TYPE 2 DIABETES MELLITUS WITHOUT COMPLICATIONS: ICD-10-CM

## 2020-11-11 DIAGNOSIS — J45.909 UNSPECIFIED ASTHMA, UNCOMPLICATED: ICD-10-CM

## 2020-11-11 DIAGNOSIS — I25.10 ATHEROSCLEROTIC HEART DISEASE OF NATIVE CORONARY ARTERY WITHOUT ANGINA PECTORIS: ICD-10-CM

## 2020-11-11 NOTE — COUNSELING
[Benefits of weight loss discussed] : Benefits of weight loss discussed [Encouraged to increase physical activity] : Encouraged to increase physical activity [FreeTextEntry2] : Pt notes she eats "tea, coffee, soup, and fruits". Minimal red meat. counseled on decreasing simple sugars and animal fats in diet.

## 2020-11-11 NOTE — ASSESSMENT
[FreeTextEntry1] : 59 y F PMHx Type 2 DM, HTN, HLD, CAD s/p RCA stent, asthma p/w f/u, med renewals.

## 2020-11-11 NOTE — PLAN
[FreeTextEntry1] : 1. Type 2 DM: A1c 9.1 today\par - c/w insulin (basaglar 25u AM, 20 u PM and admelog 15u AC)\par - c/w metformin\par - pt advised to check BS as frequently and consistently as possible.\par - Tasked Malka Block about freestyle ewelina coverage for this pt\par \par 2. HTN:\par - normotensive today\par - c/w lisinopril\par - c/w metoprolol\par \par 3. CAD s/p RCA PCI\par - c/w ASA\par \par 4. HLD:\par - c/w atorvastatin\par \par RTC in 10-15 weeks\par Case discussed with Dr. Pires\par Topher Avendano, PGY-1\par \par 5. asthma:\par - c/w albuterol prn

## 2020-11-11 NOTE — HISTORY OF PRESENT ILLNESS
[FreeTextEntry1] : f/u, med renewals [de-identified] : 59 y F PMHx Type 2 DM, HTN, HLD, CAD s/p RCA stent, asthma p/w f/u, med renewals. Does not have any active medical complaints. Notes she saw her ophthalmologist in March and podiatrist in October. However, she has not been checking her blood sugars recently due to "less time int he morning". 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 \par \par She otherwise denies HA, n/v, fevers, cp, SOB, abd pain, diarrhea, constipation, dysuria, hematuria.

## 2020-11-12 ENCOUNTER — NON-APPOINTMENT (OUTPATIENT)
Age: 60
End: 2020-11-12

## 2020-12-06 ENCOUNTER — APPOINTMENT (OUTPATIENT)
Dept: DISASTER EMERGENCY | Facility: CLINIC | Age: 60
End: 2020-12-06

## 2020-12-06 LAB — SARS-COV-2 N GENE NPH QL NAA+PROBE: NOT DETECTED

## 2020-12-08 RX ORDER — SODIUM CHLORIDE 9 MG/ML
1000 INJECTION INTRAMUSCULAR; INTRAVENOUS; SUBCUTANEOUS
Refills: 0 | Status: DISCONTINUED | OUTPATIENT
Start: 2020-12-09 | End: 2020-12-23

## 2020-12-08 NOTE — PRE PROCEDURE NOTE - PRE PROCEDURE EVALUATION
Pre-Endoscopy Evaluation    Referring Physician: [facility]                                    Indication for Procedure: Colorectal cancer screening     Sedation by Anesthesia [X ]    PAST MEDICAL & SURGICAL HISTORY:  Asthma    Sarcoidosis    Diabetes mellitus    HLD (Hyperlipidemia)    Benign Essential Hypertension    History of     Latex allergy: [ ] yes [X] no    Smoking: [ ] yes  [X] no    AICD/PPM: [ ] yes   [X] no    Physical Examination:  Vital Signs Last 24 Hrs    Constitutional: NAD    HEENT: PERRLA, EOMI,       Neck:  No JVD    Respiratory: CTAB/L    Cardiovascular: S1 and S2    Gastrointestinal: BS+, soft, NT/ND    Extremities: No peripheral edema    Neurological: A/O x 3, no focal deficits    Psychiatric: Normal mood, normal affect    : No Matta    Skin: No rashes    Comments:    ASA Class: I [ ]  II [ ]  III [X]  IV [ ]    The patient is a suitable candidate for the planned procedure unless box checked [ ]  No, explain:   Pre-Endoscopy Evaluation    Referring Physician: [facility]                                    Indication for Procedure: Colorectal cancer screening     Sedation by Anesthesia [X ]    PAST MEDICAL & SURGICAL HISTORY:  Asthma    Sarcoidosis    Diabetes mellitus    HLD (Hyperlipidemia)    Benign Essential Hypertension    History of     Latex allergy: [ ] yes [X] no    Smoking: [ ] yes  [X] no    AICD/PPM: [ ] yes   [X] no    Physical Examination:  Vital Signs:  Vital Signs Last 24 Hrs  T(C): 36.6 (09 Dec 2020 10:05), Max: 36.6 (09 Dec 2020 09:25)  T(F): 97.9 (09 Dec 2020 09:25), Max: 97.9 (09 Dec 2020 09:25)  HR: 65 (09 Dec 2020 10:05) (65 - 65)  BP: 143/68 (09 Dec 2020 10:05) (143/68 - 143/68)  BP(mean): --  RR: 16 (09 Dec 2020 10:05) (16 - 16)  SpO2: 100% (09 Dec 2020 10:05) (100% - 100%)    Constitutional: NAD    HEENT: PERRLA, EOMI,       Neck:  No JVD    Respiratory: CTAB/L    Cardiovascular: S1 and S2    Gastrointestinal: BS+, soft, NT/ND    Extremities: No peripheral edema    Neurological: A/O x 3, no focal deficits    Psychiatric: Normal mood, normal affect    : No Matta    Skin: No rashes    Comments:    ASA Class: I [ ]  II [ ]  III [X]  IV [ ]    The patient is a suitable candidate for the planned procedure unless box checked [ ]  No, explain:

## 2020-12-09 ENCOUNTER — RESULT REVIEW (OUTPATIENT)
Age: 60
End: 2020-12-09

## 2020-12-09 ENCOUNTER — OUTPATIENT (OUTPATIENT)
Dept: OUTPATIENT SERVICES | Facility: HOSPITAL | Age: 60
LOS: 1 days | End: 2020-12-09
Payer: MEDICAID

## 2020-12-09 VITALS
DIASTOLIC BLOOD PRESSURE: 68 MMHG | RESPIRATION RATE: 16 BRPM | HEART RATE: 65 BPM | TEMPERATURE: 98 F | SYSTOLIC BLOOD PRESSURE: 143 MMHG | OXYGEN SATURATION: 100 %

## 2020-12-09 VITALS
DIASTOLIC BLOOD PRESSURE: 62 MMHG | SYSTOLIC BLOOD PRESSURE: 122 MMHG | RESPIRATION RATE: 16 BRPM | OXYGEN SATURATION: 100 % | HEART RATE: 61 BPM

## 2020-12-09 DIAGNOSIS — Z98.89 OTHER SPECIFIED POSTPROCEDURAL STATES: Chronic | ICD-10-CM

## 2020-12-09 DIAGNOSIS — Z12.11 ENCOUNTER FOR SCREENING FOR MALIGNANT NEOPLASM OF COLON: ICD-10-CM

## 2020-12-09 PROCEDURE — 45385 COLONOSCOPY W/LESION REMOVAL: CPT | Mod: GC

## 2020-12-09 PROCEDURE — 88305 TISSUE EXAM BY PATHOLOGIST: CPT

## 2020-12-09 PROCEDURE — 45380 COLONOSCOPY AND BIOPSY: CPT | Mod: 59,GC

## 2020-12-09 PROCEDURE — 88305 TISSUE EXAM BY PATHOLOGIST: CPT | Mod: 26

## 2020-12-09 PROCEDURE — 45380 COLONOSCOPY AND BIOPSY: CPT | Mod: XS,PT

## 2020-12-09 PROCEDURE — 45385 COLONOSCOPY W/LESION REMOVAL: CPT | Mod: PT

## 2020-12-09 RX ORDER — INSULIN ASPART 100 [IU]/ML
15 INJECTION, SOLUTION SUBCUTANEOUS
Qty: 0 | Refills: 0 | DISCHARGE

## 2020-12-09 RX ORDER — LISINOPRIL 2.5 MG/1
1 TABLET ORAL
Qty: 0 | Refills: 0 | DISCHARGE

## 2020-12-09 NOTE — ASU PATIENT PROFILE, ADULT - PMH
Asthma    Benign Essential Hypertension    Diabetes mellitus    HLD (Hyperlipidemia)    Sarcoidosis

## 2020-12-09 NOTE — PRE-ANESTHESIA EVALUATION ADULT - NSANTHOSAYNRD_GEN_A_CORE
No. KAVYA screening performed.  STOP BANG Legend: 0-2 = LOW Risk; 3-4 = INTERMEDIATE Risk; 5-8 = HIGH Risk

## 2020-12-10 LAB — SURGICAL PATHOLOGY STUDY: SIGNIFICANT CHANGE UP

## 2020-12-14 ENCOUNTER — NON-APPOINTMENT (OUTPATIENT)
Age: 60
End: 2020-12-14

## 2021-01-04 ENCOUNTER — APPOINTMENT (OUTPATIENT)
Dept: PODIATRY | Facility: HOSPITAL | Age: 61
End: 2021-01-04

## 2021-01-12 ENCOUNTER — OUTPATIENT (OUTPATIENT)
Dept: OUTPATIENT SERVICES | Facility: HOSPITAL | Age: 61
LOS: 1 days | End: 2021-01-12
Payer: MEDICAID

## 2021-01-12 ENCOUNTER — NON-APPOINTMENT (OUTPATIENT)
Age: 61
End: 2021-01-12

## 2021-01-12 ENCOUNTER — APPOINTMENT (OUTPATIENT)
Dept: INTERNAL MEDICINE | Facility: CLINIC | Age: 61
End: 2021-01-12
Payer: COMMERCIAL

## 2021-01-12 VITALS
WEIGHT: 178 LBS | OXYGEN SATURATION: 99 % | BODY MASS INDEX: 31.54 KG/M2 | DIASTOLIC BLOOD PRESSURE: 62 MMHG | SYSTOLIC BLOOD PRESSURE: 106 MMHG | HEIGHT: 63 IN | HEART RATE: 84 BPM

## 2021-01-12 DIAGNOSIS — Z98.89 OTHER SPECIFIED POSTPROCEDURAL STATES: Chronic | ICD-10-CM

## 2021-01-12 DIAGNOSIS — I10 ESSENTIAL (PRIMARY) HYPERTENSION: ICD-10-CM

## 2021-01-12 PROCEDURE — 99213 OFFICE O/P EST LOW 20 MIN: CPT | Mod: GE

## 2021-01-12 PROCEDURE — G0463: CPT

## 2021-01-13 NOTE — PLAN
[FreeTextEntry1] : 1. Type 2 DM: A1c 9.1 11/20\par - c/w insulin (basaglar 25u AM, 20 u PM and admelog 15u TID AC)\par - c/w metformin 1000 mg BID\par - pt advised to check BS as frequently and consistently as possible- advised to use Freestyle [place]- pt noted she will download the vielka tonight\par \par 2. HTN:\par - normotensive today\par - c/w lisinopril 10 mg qd\par - c/w metoprolol succinate 25 mg qd\par \par 3. CAD s/p RCA PCI\par - c/w ASA 81 mg qd\par \par 4. HLD:\par - c/w atorvastatin 40 mg qd\par \par 5. asthma:\par - c/w albuterol prn \par \par RTC in 5 weeks for BG f/u to titrate current insulin regimen\par Case discussed with Dr. Corado\par Topher Avendano, PGY-1\par

## 2021-01-13 NOTE — ASSESSMENT
[FreeTextEntry1] : 60 y F PMHx Type 2 DM (A1c 9.1 11/20), HTN, HLD, CAD s/p RCA stent, asthma p/w f/u. Does not have any active medical complaints.

## 2021-01-13 NOTE — HISTORY OF PRESENT ILLNESS
[FreeTextEntry1] : diabetic f/u [de-identified] : 60 y F PMHx Type 2 DM (A1c 9.1 11/20), HTN, HLD, CAD s/p RCA stent, asthma p/w f/u. Does not have any active medical complaints. Notes she saw her ophthalmologist in August 2020 and podiatrist in October 2020 with another appt 1/27. Pt notes she has been checking her blood sugars "more" since her last visit but notes she forgot to bring in her diary again this time. She notes she eats bfast at 11 am so does not take premeal insulin before lunch as that is usually "just soup and tea". She notes she just got her FreeStyle Sharita yesterday but has no idea how to set it up. Provider advised she open the box and follow the instructions inside to download the vielka. Of note, she got her flu shot "months ago". Pt also notified by her company [employer] that she will get the COVID vaccine in March

## 2021-01-15 DIAGNOSIS — E11.9 TYPE 2 DIABETES MELLITUS WITHOUT COMPLICATIONS: ICD-10-CM

## 2021-01-27 ENCOUNTER — APPOINTMENT (OUTPATIENT)
Dept: PODIATRY | Facility: HOSPITAL | Age: 61
End: 2021-01-27
Payer: COMMERCIAL

## 2021-01-27 ENCOUNTER — OUTPATIENT (OUTPATIENT)
Dept: OUTPATIENT SERVICES | Facility: HOSPITAL | Age: 61
LOS: 1 days | End: 2021-01-27
Payer: MEDICAID

## 2021-01-27 VITALS
SYSTOLIC BLOOD PRESSURE: 118 MMHG | HEART RATE: 82 BPM | WEIGHT: 175 LBS | BODY MASS INDEX: 31.01 KG/M2 | DIASTOLIC BLOOD PRESSURE: 75 MMHG | TEMPERATURE: 96.5 F | HEIGHT: 63 IN

## 2021-01-27 DIAGNOSIS — Z98.89 OTHER SPECIFIED POSTPROCEDURAL STATES: Chronic | ICD-10-CM

## 2021-01-27 DIAGNOSIS — M79.609 PAIN IN UNSPECIFIED LIMB: ICD-10-CM

## 2021-01-27 DIAGNOSIS — D36.13 BENIGN NEOPLASM OF PERIPHERAL NERVES AND AUTONOMIC NERVOUS SYSTEM OF LOWER LIMB, INCLUDING HIP: ICD-10-CM

## 2021-01-27 PROCEDURE — G0463: CPT

## 2021-01-27 PROCEDURE — 99212 OFFICE O/P EST SF 10 MIN: CPT

## 2021-01-27 NOTE — ASSESSMENT
[FreeTextEntry1] : - pt seen and evaluated\par - pain on lateral compression to b/l feet- 4th and 3rd interspaces (click felt on R foot)\par - discussed w/ pt conservative and surgical, but patient does not want any surgical treatments at the moment and only wants new metatarsal pads \par - dispensed 2 removable metatarsal pads for pt\par - rtc 1 month

## 2021-01-27 NOTE — HISTORY OF PRESENT ILLNESS
[FreeTextEntry1] : 61 yo F presents to clinic complaining of b/l forefoot pain-especially 4th and 3rd interspaces. notices pain especially when she's been on her feet for too long and states it feels like she's walking on a pebble. Was given metatarsal pads from a previous podiatrist and stated that it helped immensely. Has been wearing wider shoes to prevent pain. Denies any other pedal complaints

## 2021-01-27 NOTE — PHYSICAL EXAM
[Skin Color & Pigmentation] : normal skin color and pigmentation [Skin Turgor] : normal skin turgor [] : no rash [Skin Lesions] : no skin lesions [Foot Ulcer] : no foot ulcer [Skin Induration] : no skin induration [FreeTextEntry1] : pain upon lateral compression of b/l feet - interspaces 4 and 3

## 2021-02-12 ENCOUNTER — NON-APPOINTMENT (OUTPATIENT)
Age: 61
End: 2021-02-12

## 2021-02-16 ENCOUNTER — APPOINTMENT (OUTPATIENT)
Dept: INTERNAL MEDICINE | Facility: CLINIC | Age: 61
End: 2021-02-16
Payer: COMMERCIAL

## 2021-02-16 ENCOUNTER — OUTPATIENT (OUTPATIENT)
Dept: OUTPATIENT SERVICES | Facility: HOSPITAL | Age: 61
LOS: 1 days | End: 2021-02-16
Payer: MEDICAID

## 2021-02-16 VITALS
BODY MASS INDEX: 31.18 KG/M2 | WEIGHT: 176 LBS | DIASTOLIC BLOOD PRESSURE: 70 MMHG | OXYGEN SATURATION: 99 % | HEART RATE: 80 BPM | SYSTOLIC BLOOD PRESSURE: 118 MMHG | HEIGHT: 63 IN

## 2021-02-16 DIAGNOSIS — Z98.89 OTHER SPECIFIED POSTPROCEDURAL STATES: Chronic | ICD-10-CM

## 2021-02-16 DIAGNOSIS — I10 ESSENTIAL (PRIMARY) HYPERTENSION: ICD-10-CM

## 2021-02-16 PROCEDURE — 99214 OFFICE O/P EST MOD 30 MIN: CPT | Mod: GC

## 2021-02-16 PROCEDURE — G0463: CPT

## 2021-02-17 NOTE — HISTORY OF PRESENT ILLNESS
[de-identified] : 60 y F PMHx Type 2 DM (A1c 9.1 11/20), HTN, HLD, CAD s/p RCA stent, asthma p/w f/u. Does not have any active medical complaints. Notes she saw her ophthalmologist in August 2020 and podiatrist in January 2021. Pt is using her Freestyle Sharita and checking her BG multiple times daily and keeping track on a notebook as well which she brought in. Notes her morning fasting BG ranges between 45 (once) and 100 but usually around 70-80 and is a/w sweating and HA. When she sees this she eats a snack and then takes her basaglar 25u and admelog 15u at the same time with bfast. BG in the late morning are usually 120-170s and in the mid-late afternoon can be as high as 250 but the pt notes she does not eat lunch and refuses to take lunchtime admelog as she has been hypoglycemic in the past with this. She notes she takes her admelog 15u and basaglar 20u at the same time around dinner. She sometimes wakes up at 2-3 am feeling sweaty and isn't sure if this is hypoglycemia or hot flashes but since using the Freestyle she has checked her BG during these times and they have been "low in the 70s". \par \par Notes she got her first COVID vaccine dose a few weeks ago and is due for her second one this week.

## 2021-02-17 NOTE — PHYSICAL EXAM
[No Acute Distress] : no acute distress [Well-Appearing] : well-appearing [Normal Sclera/Conjunctiva] : normal sclera/conjunctiva [EOMI] : extraocular movements intact [Normal Outer Ear/Nose] : the outer ears and nose were normal in appearance [No JVD] : no jugular venous distention [Supple] : supple [No Respiratory Distress] : no respiratory distress  [Clear to Auscultation] : lungs were clear to auscultation bilaterally [Normal S1, S2] : normal S1 and S2 [Normal Rate] : normal rate  [Soft] : abdomen soft [Non Tender] : non-tender [Normal] : no CVA or spinal tenderness [Grossly Normal Strength/Tone] : grossly normal strength/tone [No Rash] : no rash [No Skin Lesions] : no skin lesions [Coordination Grossly Intact] : coordination grossly intact [No Focal Deficits] : no focal deficits [Speech Grossly Normal] : speech grossly normal [Alert and Oriented x3] : oriented to person, place, and time [Normal Mood] : the mood was normal

## 2021-02-17 NOTE — PLAN
[FreeTextEntry1] : 1. Type 2 DM: A1c 9.1 11/20\par - increase am basaglar to 28u and check BG- if still high (>200s) increase to 30u.\par - decrease night basaglar to 15u to avoid hypoglycemia o/n, upon waking in am\par - c/w admelog 15u before bfast and dinner \par - c/w metformin 1000 mg BID\par \par 2. HTN:\par - normotensive today\par - c/w lisinopril 10 mg qd\par - c/w metoprolol succinate 25 mg qd\par \par 3. CAD s/p RCA PCI\par - c/w ASA 81 mg qd\par \par 4. HLD:\par - c/w atorvastatin 40 mg qd\par \par 5. asthma:\par - c/w albuterol prn \par \par Return to clinic in 10-15 weeks for BG f/u\par Case discussed with Dr. Hathaway\par Topher Avendano, PGY-1

## 2021-02-18 DIAGNOSIS — I25.10 ATHEROSCLEROTIC HEART DISEASE OF NATIVE CORONARY ARTERY WITHOUT ANGINA PECTORIS: ICD-10-CM

## 2021-02-18 DIAGNOSIS — E11.9 TYPE 2 DIABETES MELLITUS WITHOUT COMPLICATIONS: ICD-10-CM

## 2021-02-24 ENCOUNTER — APPOINTMENT (OUTPATIENT)
Dept: PODIATRY | Facility: HOSPITAL | Age: 61
End: 2021-02-24

## 2021-03-02 NOTE — DIETITIAN INITIAL EVALUATION ADULT. - NS AS NUTRI DX KNOWLEDGE BELIEFS
Writer called pt to discuss ED visit from yesterday 3/1/21. Pt states she is still not feeling well, but has prescriptions that she needs to  today. Writer asked if she has any appts scheduled with her PCP. She stated that she is going to call today and will be able to see her tomorrow. Writer reminded her about her cardiology appt tomorrow. Writer provided pt with the information again. Writer asked if she needed any other assistance today, pt said no. Pt was encouraged to give writer a call if needed.  
Food - and nutrition - related knowledge deficit/Self - monitoring deficit

## 2021-03-11 ENCOUNTER — NON-APPOINTMENT (OUTPATIENT)
Age: 61
End: 2021-03-11

## 2021-03-11 ENCOUNTER — APPOINTMENT (OUTPATIENT)
Dept: OPHTHALMOLOGY | Facility: CLINIC | Age: 61
End: 2021-03-11
Payer: COMMERCIAL

## 2021-03-11 PROCEDURE — 92014 COMPRE OPH EXAM EST PT 1/>: CPT

## 2021-03-11 PROCEDURE — 92134 CPTRZ OPH DX IMG PST SGM RTA: CPT

## 2021-03-11 PROCEDURE — 99072 ADDL SUPL MATRL&STAF TM PHE: CPT

## 2021-03-15 ENCOUNTER — APPOINTMENT (OUTPATIENT)
Dept: CARDIOLOGY | Facility: CLINIC | Age: 61
End: 2021-03-15
Payer: COMMERCIAL

## 2021-03-15 ENCOUNTER — NON-APPOINTMENT (OUTPATIENT)
Age: 61
End: 2021-03-15

## 2021-03-15 VITALS
SYSTOLIC BLOOD PRESSURE: 107 MMHG | HEIGHT: 63 IN | HEART RATE: 68 BPM | BODY MASS INDEX: 31.54 KG/M2 | WEIGHT: 178 LBS | OXYGEN SATURATION: 99 % | DIASTOLIC BLOOD PRESSURE: 70 MMHG

## 2021-03-15 PROCEDURE — 99214 OFFICE O/P EST MOD 30 MIN: CPT

## 2021-03-15 PROCEDURE — 99072 ADDL SUPL MATRL&STAF TM PHE: CPT

## 2021-03-15 PROCEDURE — 93000 ELECTROCARDIOGRAM COMPLETE: CPT

## 2021-03-15 RX ORDER — POLYETHYLENE GLYCOL-3350 AND ELECTROLYTES 236; 6.74; 5.86; 2.97; 22.74 G/274.31G; G/274.31G; G/274.31G; G/274.31G; G/274.31G
236 POWDER, FOR SOLUTION ORAL
Qty: 1 | Refills: 0 | Status: DISCONTINUED | COMMUNITY
Start: 2020-11-16 | End: 2021-03-15

## 2021-03-15 RX ORDER — POLYETHYLENE GLYCOL 3350 17 G/17G
17 POWDER, FOR SOLUTION ORAL
Qty: 14 | Refills: 0 | Status: DISCONTINUED | COMMUNITY
Start: 2020-11-17 | End: 2021-03-15

## 2021-03-15 RX ORDER — POLYETHYLENE GLYCOL 3350 AND ELECTROLYTES WITH LEMON FLAVOR 236; 22.74; 6.74; 5.86; 2.97 G/4L; G/4L; G/4L; G/4L; G/4L
236 POWDER, FOR SOLUTION ORAL
Qty: 1 | Refills: 0 | Status: DISCONTINUED | COMMUNITY
Start: 2020-10-27 | End: 2021-03-15

## 2021-03-17 ENCOUNTER — OUTPATIENT (OUTPATIENT)
Dept: OUTPATIENT SERVICES | Facility: HOSPITAL | Age: 61
LOS: 1 days | End: 2021-03-17
Payer: MEDICAID

## 2021-03-17 ENCOUNTER — APPOINTMENT (OUTPATIENT)
Dept: PODIATRY | Facility: HOSPITAL | Age: 61
End: 2021-03-17
Payer: COMMERCIAL

## 2021-03-17 VITALS
HEART RATE: 74 BPM | HEIGHT: 63 IN | SYSTOLIC BLOOD PRESSURE: 137 MMHG | TEMPERATURE: 96 F | DIASTOLIC BLOOD PRESSURE: 71 MMHG | WEIGHT: 180 LBS | BODY MASS INDEX: 31.89 KG/M2

## 2021-03-17 DIAGNOSIS — E11.9 TYPE 2 DIABETES MELLITUS WITHOUT COMPLICATIONS: ICD-10-CM

## 2021-03-17 DIAGNOSIS — M21.41 FLAT FOOT [PES PLANUS] (ACQUIRED), RIGHT FOOT: ICD-10-CM

## 2021-03-17 DIAGNOSIS — D36.13 BENIGN NEOPLASM OF PERIPHERAL NERVES AND AUTONOMIC NERVOUS SYSTEM OF LOWER LIMB, INCLUDING HIP: ICD-10-CM

## 2021-03-17 DIAGNOSIS — M72.2 PLANTAR FASCIAL FIBROMATOSIS: ICD-10-CM

## 2021-03-17 DIAGNOSIS — Z98.89 OTHER SPECIFIED POSTPROCEDURAL STATES: Chronic | ICD-10-CM

## 2021-03-17 DIAGNOSIS — M79.609 PAIN IN UNSPECIFIED LIMB: ICD-10-CM

## 2021-03-17 PROCEDURE — G0463: CPT

## 2021-03-17 PROCEDURE — 99212 OFFICE O/P EST SF 10 MIN: CPT

## 2021-03-17 NOTE — PHYSICAL EXAM
[2+] : left foot dorsalis pedis 2+ [Skin Color & Pigmentation] : normal skin color and pigmentation [Skin Turgor] : normal skin turgor [] : no rash [Skin Lesions] : no skin lesions [Foot Ulcer] : no foot ulcer [Skin Induration] : no skin induration [FreeTextEntry1] : pain upon lateral compression of b/l feet - interspaces 4 and 3

## 2021-03-17 NOTE — ASSESSMENT
[FreeTextEntry1] : 61 yo f with b/l metatarsalgia\par - pt seen and evaluated\par - pain on lateral compression to b/l feet- 4th and 3rd interspaces (click felt on R foot)\par - discussed w/ pt conservative and surgical, but patient does not want any surgical treatments at the moment and only wants new metatarsal pads \par - dispensed 2 removable metatarsal pads for pt\par - RTC 1 month

## 2021-03-28 NOTE — HISTORY OF PRESENT ILLNESS
[FreeTextEntry1] : 60 year old female with HTN controlled on meds, HLD stable on statins s/p episode of UA and cath with stent of RCA.  Pt feeling well since stent without CP, SOB or H/A

## 2021-04-14 ENCOUNTER — APPOINTMENT (OUTPATIENT)
Dept: PODIATRY | Facility: HOSPITAL | Age: 61
End: 2021-04-14

## 2021-04-28 ENCOUNTER — RESULT REVIEW (OUTPATIENT)
Age: 61
End: 2021-04-28

## 2021-04-28 ENCOUNTER — APPOINTMENT (OUTPATIENT)
Dept: PODIATRY | Facility: HOSPITAL | Age: 61
End: 2021-04-28
Payer: COMMERCIAL

## 2021-04-28 ENCOUNTER — OUTPATIENT (OUTPATIENT)
Dept: OUTPATIENT SERVICES | Facility: HOSPITAL | Age: 61
LOS: 1 days | End: 2021-04-28
Payer: MEDICAID

## 2021-04-28 VITALS
DIASTOLIC BLOOD PRESSURE: 75 MMHG | HEIGHT: 63 IN | TEMPERATURE: 96 F | HEART RATE: 80 BPM | WEIGHT: 174 LBS | SYSTOLIC BLOOD PRESSURE: 144 MMHG | BODY MASS INDEX: 30.83 KG/M2

## 2021-04-28 DIAGNOSIS — E11.9 TYPE 2 DIABETES MELLITUS WITHOUT COMPLICATIONS: ICD-10-CM

## 2021-04-28 DIAGNOSIS — M72.2 PLANTAR FASCIAL FIBROMATOSIS: ICD-10-CM

## 2021-04-28 DIAGNOSIS — Z98.89 OTHER SPECIFIED POSTPROCEDURAL STATES: Chronic | ICD-10-CM

## 2021-04-28 DIAGNOSIS — M21.41 FLAT FOOT [PES PLANUS] (ACQUIRED), RIGHT FOOT: ICD-10-CM

## 2021-04-28 DIAGNOSIS — M76.829 POSTERIOR TIBIAL TENDINITIS, UNSPECIFIED LEG: ICD-10-CM

## 2021-04-28 DIAGNOSIS — M77.41 METATARSALGIA, RIGHT FOOT: ICD-10-CM

## 2021-04-28 PROCEDURE — 73630 X-RAY EXAM OF FOOT: CPT | Mod: 26,50

## 2021-04-28 PROCEDURE — 99212 OFFICE O/P EST SF 10 MIN: CPT

## 2021-04-28 NOTE — ASSESSMENT
[FreeTextEntry1] : 59 yo f with b/l metatarsalgia, PlantarFasciitis, PTTD\par - pt seen and evaluated\par - continue wearing met pads and OTC insert powder step \par - referred to orthotist for customized orthotics \par - Rx'd bl foot Xrays \par - RTO 1 month

## 2021-04-28 NOTE — PHYSICAL EXAM
[2+] : left foot dorsalis pedis 2+ [Skin Color & Pigmentation] : normal skin color and pigmentation [] : no rash [Skin Turgor] : normal skin turgor [Skin Lesions] : no skin lesions [Foot Ulcer] : no foot ulcer [Skin Induration] : no skin induration [FreeTextEntry1] : pain upon lateral compression of b/l feet - interspaces 4 and 3

## 2021-04-28 NOTE — HISTORY OF PRESENT ILLNESS
[FreeTextEntry1] : 59 yo F presents to clinic complaining of b/l forefoot pain to forefoot, arch and PT tendons. She had met pads which helped her \par but she did not wear it daily because they won't fit into her shoes sometimes.

## 2021-05-19 ENCOUNTER — RESULT CHARGE (OUTPATIENT)
Age: 61
End: 2021-05-19

## 2021-05-19 ENCOUNTER — APPOINTMENT (OUTPATIENT)
Dept: INTERNAL MEDICINE | Facility: CLINIC | Age: 61
End: 2021-05-19

## 2021-05-19 VITALS
WEIGHT: 177 LBS | SYSTOLIC BLOOD PRESSURE: 112 MMHG | OXYGEN SATURATION: 98 % | HEART RATE: 78 BPM | BODY MASS INDEX: 31.35 KG/M2 | DIASTOLIC BLOOD PRESSURE: 70 MMHG

## 2021-05-19 LAB
A1C WITH ESTIMATED AVERAGE GLUCOSE RESULT: 8.4 % — HIGH (ref 4–5.6)
ALBUMIN SERPL ELPH-MCNC: 4.7 G/DL — SIGNIFICANT CHANGE UP (ref 3.3–5)
ALP SERPL-CCNC: 72 U/L — SIGNIFICANT CHANGE UP (ref 40–120)
ALT FLD-CCNC: 20 U/L — SIGNIFICANT CHANGE UP (ref 10–45)
ANION GAP SERPL CALC-SCNC: 13 MMOL/L — SIGNIFICANT CHANGE UP (ref 5–17)
AST SERPL-CCNC: 21 U/L — SIGNIFICANT CHANGE UP (ref 10–40)
BASOPHILS # BLD AUTO: 0.06 K/UL — SIGNIFICANT CHANGE UP (ref 0–0.2)
BASOPHILS NFR BLD AUTO: 0.9 % — SIGNIFICANT CHANGE UP (ref 0–2)
BILIRUB SERPL-MCNC: 0.3 MG/DL — SIGNIFICANT CHANGE UP (ref 0.2–1.2)
BUN SERPL-MCNC: 12 MG/DL — SIGNIFICANT CHANGE UP (ref 7–23)
CALCIUM SERPL-MCNC: 10.2 MG/DL — SIGNIFICANT CHANGE UP (ref 8.4–10.5)
CHLORIDE SERPL-SCNC: 99 MMOL/L — SIGNIFICANT CHANGE UP (ref 96–108)
CHOLEST SERPL-MCNC: 143 MG/DL — SIGNIFICANT CHANGE UP
CO2 SERPL-SCNC: 22 MMOL/L — SIGNIFICANT CHANGE UP (ref 22–31)
CREAT SERPL-MCNC: 0.73 MG/DL — SIGNIFICANT CHANGE UP (ref 0.5–1.3)
EOSINOPHIL # BLD AUTO: 0.08 K/UL — SIGNIFICANT CHANGE UP (ref 0–0.5)
EOSINOPHIL NFR BLD AUTO: 1.1 % — SIGNIFICANT CHANGE UP (ref 0–6)
ESTIMATED AVERAGE GLUCOSE: 194 MG/DL — HIGH (ref 68–114)
GLUCOSE SERPL-MCNC: 233 MG/DL — HIGH (ref 70–99)
HBA1C MFR BLD HPLC: 8.3
HCT VFR BLD CALC: 39.7 % — SIGNIFICANT CHANGE UP (ref 34.5–45)
HDLC SERPL-MCNC: 69 MG/DL — SIGNIFICANT CHANGE UP
HGB BLD-MCNC: 12.2 G/DL — SIGNIFICANT CHANGE UP (ref 11.5–15.5)
IMM GRANULOCYTES NFR BLD AUTO: 0.3 % — SIGNIFICANT CHANGE UP (ref 0–1.5)
LIPID PNL WITH DIRECT LDL SERPL: 47 MG/DL — SIGNIFICANT CHANGE UP
LYMPHOCYTES # BLD AUTO: 2.74 K/UL — SIGNIFICANT CHANGE UP (ref 1–3.3)
LYMPHOCYTES # BLD AUTO: 39.3 % — SIGNIFICANT CHANGE UP (ref 13–44)
MCHC RBC-ENTMCNC: 28.1 PG — SIGNIFICANT CHANGE UP (ref 27–34)
MCHC RBC-ENTMCNC: 30.7 GM/DL — LOW (ref 32–36)
MCV RBC AUTO: 91.5 FL — SIGNIFICANT CHANGE UP (ref 80–100)
MONOCYTES # BLD AUTO: 0.56 K/UL — SIGNIFICANT CHANGE UP (ref 0–0.9)
MONOCYTES NFR BLD AUTO: 8 % — SIGNIFICANT CHANGE UP (ref 2–14)
NEUTROPHILS # BLD AUTO: 3.51 K/UL — SIGNIFICANT CHANGE UP (ref 1.8–7.4)
NEUTROPHILS NFR BLD AUTO: 50.4 % — SIGNIFICANT CHANGE UP (ref 43–77)
NON HDL CHOLESTEROL: 73 MG/DL — SIGNIFICANT CHANGE UP
PLATELET # BLD AUTO: 253 K/UL — SIGNIFICANT CHANGE UP (ref 150–400)
POTASSIUM SERPL-MCNC: 5.4 MMOL/L — HIGH (ref 3.5–5.3)
POTASSIUM SERPL-SCNC: 5.4 MMOL/L — HIGH (ref 3.5–5.3)
PROT SERPL-MCNC: 7.1 G/DL — SIGNIFICANT CHANGE UP (ref 6–8.3)
RBC # BLD: 4.34 M/UL — SIGNIFICANT CHANGE UP (ref 3.8–5.2)
RBC # FLD: 12.5 % — SIGNIFICANT CHANGE UP (ref 10.3–14.5)
SODIUM SERPL-SCNC: 134 MMOL/L — LOW (ref 135–145)
TRIGL SERPL-MCNC: 131 MG/DL — SIGNIFICANT CHANGE UP
WBC # BLD: 6.97 K/UL — SIGNIFICANT CHANGE UP (ref 3.8–10.5)
WBC # FLD AUTO: 6.97 K/UL — SIGNIFICANT CHANGE UP (ref 3.8–10.5)

## 2021-05-19 PROCEDURE — 73630 X-RAY EXAM OF FOOT: CPT

## 2021-05-19 PROCEDURE — 80061 LIPID PANEL: CPT

## 2021-05-19 PROCEDURE — 80053 COMPREHEN METABOLIC PANEL: CPT

## 2021-05-19 PROCEDURE — 83036 HEMOGLOBIN GLYCOSYLATED A1C: CPT

## 2021-05-19 PROCEDURE — 85025 COMPLETE CBC W/AUTO DIFF WBC: CPT

## 2021-05-19 PROCEDURE — G0463: CPT

## 2021-05-22 NOTE — PLAN
[FreeTextEntry1] : \par #T2DM\par - POC A1C today 8.3% (previously 9.5%)\par - A1C, lipid profile, CMP\par - Follows with ophthalmologist - plans to follow up this year\par - Follows with podiatrist\par - Educated regarding regular meals and proper diet and management of hypoglycemia\par - Nutrition and Pharmacy assistance for meal planning and insulin management education\par \par #CAD\par - Continue ASA, statin\par \par #HTN\par - Continue toprol, lisinopril\par \par #Asthma\par Ventolin PRN\par \par #HCM\par Colonoscopy due in 2023\par - Repeat mammogram this year\par - Script for shingrix sent to [facility]\par - Patient completed COVID vaccine series and received 23 valent pneumonia vaccine\par \par Patient seen and discussed with Dr. Weber (Attending)\par \par Radha Gallegos MD\par PGY1 Medicine

## 2021-05-22 NOTE — ASSESSMENT
[FreeTextEntry1] : 60 y F PMHx Type 2 DM , HTN, HLD, CAD s/p RCA stent, asthma presenting for CPE and noted to have variable blood sugar readings at home with inconsistent meal schedule

## 2021-05-22 NOTE — HISTORY OF PRESENT ILLNESS
[FreeTextEntry1] : CPE [de-identified] : Patient is a 60 year old woman with PMH T2DM, HTN, HLD, CAD s/p RCA stent, asthma presenting for CPE. Patient reports having the COVID vaccine. Patient reports not having a consistent diet with long history of being a picky eater. Patient states she usually picks the food from her work.  Eats a lot of fruit, pineapple. Not much protein/meat or carbohydrates, but a lot of salads. \par Patient reports checking her blood sugar in AM. Takes 28 basaglar in the morning and 20 in the evening and is supposed to take 15 admelog premeal but not consistent as does not have a consistent meal schedule. States has had issues with low BG readings for which she takes juice. Often during day does not take insulin due to not having regular meals. Reports being a picky eater, usually does not eat breakfast unless her blood sugar is low.\par \par Rarely uses albuterol inhaler for history of asthma.\par \par Patient originally from [place]. Completed COVID vaccines Pfizer 2/2 series.

## 2021-05-25 ENCOUNTER — OUTPATIENT (OUTPATIENT)
Dept: OUTPATIENT SERVICES | Facility: HOSPITAL | Age: 61
LOS: 1 days | End: 2021-05-25
Payer: MEDICAID

## 2021-05-25 ENCOUNTER — APPOINTMENT (OUTPATIENT)
Dept: INTERNAL MEDICINE | Facility: CLINIC | Age: 61
End: 2021-05-25

## 2021-05-25 DIAGNOSIS — E11.9 TYPE 2 DIABETES MELLITUS WITHOUT COMPLICATIONS: ICD-10-CM

## 2021-05-25 DIAGNOSIS — Z98.89 OTHER SPECIFIED POSTPROCEDURAL STATES: Chronic | ICD-10-CM

## 2021-05-25 PROCEDURE — 97802 MEDICAL NUTRITION INDIV IN: CPT

## 2021-05-26 ENCOUNTER — APPOINTMENT (OUTPATIENT)
Dept: PODIATRY | Facility: HOSPITAL | Age: 61
End: 2021-05-26

## 2021-05-26 ENCOUNTER — NON-APPOINTMENT (OUTPATIENT)
Age: 61
End: 2021-05-26

## 2021-06-02 ENCOUNTER — APPOINTMENT (OUTPATIENT)
Dept: INTERNAL MEDICINE | Facility: CLINIC | Age: 61
End: 2021-06-02

## 2021-06-09 ENCOUNTER — NON-APPOINTMENT (OUTPATIENT)
Age: 61
End: 2021-06-09

## 2021-06-18 DIAGNOSIS — I10 ESSENTIAL (PRIMARY) HYPERTENSION: ICD-10-CM

## 2021-08-10 ENCOUNTER — NON-APPOINTMENT (OUTPATIENT)
Age: 61
End: 2021-08-10

## 2021-08-11 ENCOUNTER — OUTPATIENT (OUTPATIENT)
Dept: OUTPATIENT SERVICES | Facility: HOSPITAL | Age: 61
LOS: 1 days | End: 2021-08-11
Payer: MEDICAID

## 2021-08-11 ENCOUNTER — APPOINTMENT (OUTPATIENT)
Dept: INTERNAL MEDICINE | Facility: CLINIC | Age: 61
End: 2021-08-11
Payer: MEDICAID

## 2021-08-11 VITALS
SYSTOLIC BLOOD PRESSURE: 112 MMHG | BODY MASS INDEX: 31.18 KG/M2 | WEIGHT: 176 LBS | OXYGEN SATURATION: 98 % | DIASTOLIC BLOOD PRESSURE: 60 MMHG | HEART RATE: 77 BPM | HEIGHT: 63 IN

## 2021-08-11 DIAGNOSIS — E11.9 TYPE 2 DIABETES MELLITUS WITHOUT COMPLICATIONS: ICD-10-CM

## 2021-08-11 DIAGNOSIS — I10 ESSENTIAL (PRIMARY) HYPERTENSION: ICD-10-CM

## 2021-08-11 DIAGNOSIS — Z98.89 OTHER SPECIFIED POSTPROCEDURAL STATES: Chronic | ICD-10-CM

## 2021-08-11 PROCEDURE — G0463: CPT

## 2021-08-11 PROCEDURE — 99213 OFFICE O/P EST LOW 20 MIN: CPT | Mod: GE

## 2021-08-11 NOTE — HISTORY OF PRESENT ILLNESS
[FreeTextEntry1] : f/u [de-identified] : 60 y F PMHx Type 2 DM (A1c 7.7 8/21), HTN, HLD, CAD s/p RCA stent, asthma p/w f/u. Does not have any active medical complaints. Pt notes she used her Freestyle Sharita in March of this year but since then has been unable to get a new one due to cost (>$100). She has been checking her BG once daily in the morning as this is "all I get time for" and brought in her diary with BG and BP. BP largely 120s/60-80s. BG largely 70-150s with few BG as high as low 200s. She notes she needs refills for her insulin and all the supplies.

## 2021-08-11 NOTE — ASSESSMENT
[FreeTextEntry1] : 60 y F PMHx Type 2 DM (A1c 7.7 8/21), HTN, HLD, CAD s/p RCA stent, asthma p/w f/u.

## 2021-08-11 NOTE — PLAN
[FreeTextEntry1] : 1. Type 2 DM: \par - A1c improving (now 7.7)\par - c/w insulin regimen- refills sent\par - Continue to track BG- tasked Dr. Altman regarding possible discounts on Freestyle Sharita\par \par 2. HTN:\par - normotensive today\par - c/w lisinopril 10 mg qd\par - c/w metoprolol succinate 25 mg qd\par \par 3. CAD s/p RCA PCI\par - c/w ASA 81 mg qd\par \par 4. HLD:\par - c/w atorvastatin 40 mg qd\par \par 5. asthma:\par - c/w albuterol prn \par \par Return to clinic in 15 weeks for A1c f/u\par Case discussed with Dr. Baltazar\par Topher Avendano, PGY-2\par

## 2021-08-12 ENCOUNTER — NON-APPOINTMENT (OUTPATIENT)
Age: 61
End: 2021-08-12

## 2021-08-12 ENCOUNTER — APPOINTMENT (OUTPATIENT)
Dept: OPHTHALMOLOGY | Facility: CLINIC | Age: 61
End: 2021-08-12
Payer: MEDICAID

## 2021-08-12 PROCEDURE — 92014 COMPRE OPH EXAM EST PT 1/>: CPT

## 2021-08-12 PROCEDURE — 92250 FUNDUS PHOTOGRAPHY W/I&R: CPT

## 2021-09-20 ENCOUNTER — NON-APPOINTMENT (OUTPATIENT)
Age: 61
End: 2021-09-20

## 2021-09-20 ENCOUNTER — APPOINTMENT (OUTPATIENT)
Dept: CARDIOLOGY | Facility: CLINIC | Age: 61
End: 2021-09-20
Payer: MEDICAID

## 2021-09-20 VITALS
HEIGHT: 63 IN | HEART RATE: 74 BPM | DIASTOLIC BLOOD PRESSURE: 72 MMHG | OXYGEN SATURATION: 98 % | SYSTOLIC BLOOD PRESSURE: 107 MMHG | WEIGHT: 176 LBS | BODY MASS INDEX: 31.18 KG/M2

## 2021-09-20 PROCEDURE — 93000 ELECTROCARDIOGRAM COMPLETE: CPT

## 2021-09-20 PROCEDURE — 99214 OFFICE O/P EST MOD 30 MIN: CPT

## 2021-09-20 RX ORDER — ASPIRIN ENTERIC COATED TABLETS 81 MG 81 MG/1
81 TABLET, DELAYED RELEASE ORAL
Qty: 90 | Refills: 2 | Status: DISCONTINUED | COMMUNITY
Start: 2021-05-20 | End: 2021-09-20

## 2021-09-27 ENCOUNTER — RX RENEWAL (OUTPATIENT)
Age: 61
End: 2021-09-27

## 2021-10-06 NOTE — PHYSICAL EXAM
[General Appearance - Well Developed] : well developed [Normal Appearance] : normal appearance [Well Groomed] : well groomed [General Appearance - Well Nourished] : well nourished [No Deformities] : no deformities [General Appearance - In No Acute Distress] : no acute distress [Normal Conjunctiva] : the conjunctiva exhibited no abnormalities [Eyelids - No Xanthelasma] : the eyelids demonstrated no xanthelasmas [Normal Oral Mucosa] : normal oral mucosa [No Oral Pallor] : no oral pallor [No Oral Cyanosis] : no oral cyanosis [Normal Jugular Venous A Waves Present] : normal jugular venous A waves present [Normal Jugular Venous V Waves Present] : normal jugular venous V waves present [No Jugular Venous Early A Waves] : no jugular venous early A waves [Exaggerated Use Of Accessory Muscles For Inspiration] : no accessory muscle use [Respiration, Rhythm And Depth] : normal respiratory rhythm and effort [Auscultation Breath Sounds / Voice Sounds] : lungs were clear to auscultation bilaterally [Heart Rate And Rhythm] : heart rate and rhythm were normal [Heart Sounds] : normal S1 and S2 [Murmurs] : no murmurs present [Abdomen Soft] : soft [Abdomen Tenderness] : non-tender [Abdomen Mass (___ Cm)] : no abdominal mass palpated [Abnormal Walk] : normal gait [Gait - Sufficient For Exercise Testing] : the gait was sufficient for exercise testing [Nail Clubbing] : no clubbing of the fingernails [Cyanosis, Localized] : no localized cyanosis [Petechial Hemorrhages (___cm)] : no petechial hemorrhages [Skin Color & Pigmentation] : normal skin color and pigmentation [] : no rash [No Venous Stasis] : no venous stasis [Skin Lesions] : no skin lesions [No Skin Ulcers] : no skin ulcer [No Xanthoma] : no  xanthoma was observed [Oriented To Time, Place, And Person] : oriented to person, place, and time [Affect] : the affect was normal [Mood] : the mood was normal [No Anxiety] : not feeling anxious

## 2021-10-08 ENCOUNTER — NON-APPOINTMENT (OUTPATIENT)
Age: 61
End: 2021-10-08

## 2021-10-12 ENCOUNTER — NON-APPOINTMENT (OUTPATIENT)
Age: 61
End: 2021-10-12

## 2021-10-12 ENCOUNTER — RX RENEWAL (OUTPATIENT)
Age: 61
End: 2021-10-12

## 2021-10-13 ENCOUNTER — RX RENEWAL (OUTPATIENT)
Age: 61
End: 2021-10-13

## 2021-10-13 RX ORDER — FLASH GLUCOSE SENSOR
KIT MISCELLANEOUS
Qty: 2 | Refills: 0 | Status: ACTIVE | COMMUNITY
Start: 2020-11-04 | End: 1900-01-01

## 2021-10-22 ENCOUNTER — NON-APPOINTMENT (OUTPATIENT)
Age: 61
End: 2021-10-22

## 2021-11-10 ENCOUNTER — RESULT REVIEW (OUTPATIENT)
Age: 61
End: 2021-11-10

## 2021-11-10 ENCOUNTER — APPOINTMENT (OUTPATIENT)
Dept: ULTRASOUND IMAGING | Facility: IMAGING CENTER | Age: 61
End: 2021-11-10
Payer: MEDICAID

## 2021-11-10 ENCOUNTER — APPOINTMENT (OUTPATIENT)
Dept: MAMMOGRAPHY | Facility: IMAGING CENTER | Age: 61
End: 2021-11-10
Payer: MEDICAID

## 2021-11-10 ENCOUNTER — OUTPATIENT (OUTPATIENT)
Dept: OUTPATIENT SERVICES | Facility: HOSPITAL | Age: 61
LOS: 1 days | End: 2021-11-10
Payer: MEDICAID

## 2021-11-10 DIAGNOSIS — Z00.00 ENCOUNTER FOR GENERAL ADULT MEDICAL EXAMINATION WITHOUT ABNORMAL FINDINGS: ICD-10-CM

## 2021-11-10 DIAGNOSIS — Z98.89 OTHER SPECIFIED POSTPROCEDURAL STATES: Chronic | ICD-10-CM

## 2021-11-10 PROCEDURE — 77067 SCR MAMMO BI INCL CAD: CPT

## 2021-11-10 PROCEDURE — 77067 SCR MAMMO BI INCL CAD: CPT | Mod: 26

## 2021-11-10 PROCEDURE — 76641 ULTRASOUND BREAST COMPLETE: CPT

## 2021-11-10 PROCEDURE — 76641 ULTRASOUND BREAST COMPLETE: CPT | Mod: 26,50

## 2021-11-10 PROCEDURE — 77063 BREAST TOMOSYNTHESIS BI: CPT | Mod: 26

## 2021-11-10 PROCEDURE — 77063 BREAST TOMOSYNTHESIS BI: CPT

## 2021-11-22 ENCOUNTER — APPOINTMENT (OUTPATIENT)
Dept: INTERNAL MEDICINE | Facility: CLINIC | Age: 61
End: 2021-11-22
Payer: MEDICAID

## 2021-11-22 ENCOUNTER — OUTPATIENT (OUTPATIENT)
Dept: OUTPATIENT SERVICES | Facility: HOSPITAL | Age: 61
LOS: 1 days | End: 2021-11-22
Payer: MEDICAID

## 2021-11-22 VITALS
DIASTOLIC BLOOD PRESSURE: 78 MMHG | OXYGEN SATURATION: 100 % | WEIGHT: 177 LBS | SYSTOLIC BLOOD PRESSURE: 128 MMHG | BODY MASS INDEX: 31.36 KG/M2 | HEIGHT: 63 IN | HEART RATE: 82 BPM

## 2021-11-22 DIAGNOSIS — Z98.89 OTHER SPECIFIED POSTPROCEDURAL STATES: Chronic | ICD-10-CM

## 2021-11-22 DIAGNOSIS — H40.039 ANATOMICAL NARROW ANGLE, UNSPECIFIED EYE: ICD-10-CM

## 2021-11-22 DIAGNOSIS — H43.813 VITREOUS DEGENERATION, BILATERAL: ICD-10-CM

## 2021-11-22 DIAGNOSIS — R23.2 FLUSHING: ICD-10-CM

## 2021-11-22 DIAGNOSIS — N95.1 MENOPAUSAL AND FEMALE CLIMACTERIC STATES: ICD-10-CM

## 2021-11-22 DIAGNOSIS — N63.0 UNSPECIFIED LUMP IN UNSPECIFIED BREAST: ICD-10-CM

## 2021-11-22 DIAGNOSIS — Z87.898 PERSONAL HISTORY OF OTHER SPECIFIED CONDITIONS: ICD-10-CM

## 2021-11-22 DIAGNOSIS — Z86.39 PERSONAL HISTORY OF OTHER ENDOCRINE, NUTRITIONAL AND METABOLIC DISEASE: ICD-10-CM

## 2021-11-22 DIAGNOSIS — H81.11 BENIGN PAROXYSMAL VERTIGO, RIGHT EAR: ICD-10-CM

## 2021-11-22 DIAGNOSIS — H25.13 AGE-RELATED NUCLEAR CATARACT, BILATERAL: ICD-10-CM

## 2021-11-22 DIAGNOSIS — Z01.818 ENCOUNTER FOR OTHER PREPROCEDURAL EXAMINATION: ICD-10-CM

## 2021-11-22 DIAGNOSIS — I10 ESSENTIAL (PRIMARY) HYPERTENSION: ICD-10-CM

## 2021-11-22 DIAGNOSIS — H04.129 DRY EYE SYNDROME OF UNSPECIFIED LACRIMAL GLAND: ICD-10-CM

## 2021-11-22 DIAGNOSIS — Z12.11 ENCOUNTER FOR SCREENING FOR MALIGNANT NEOPLASM OF COLON: ICD-10-CM

## 2021-11-22 PROCEDURE — G0463: CPT | Mod: 25

## 2021-11-22 PROCEDURE — 83036 HEMOGLOBIN GLYCOSYLATED A1C: CPT

## 2021-11-22 PROCEDURE — 99214 OFFICE O/P EST MOD 30 MIN: CPT | Mod: GC

## 2021-11-22 RX ORDER — ZOSTER VACCINE RECOMBINANT, ADJUVANTED 50 MCG/0.5
50 KIT INTRAMUSCULAR
Qty: 1 | Refills: 1 | Status: DISCONTINUED | COMMUNITY
Start: 2021-05-19 | End: 2021-11-22

## 2021-11-22 RX ORDER — BLOOD SUGAR DIAGNOSTIC
STRIP MISCELLANEOUS 3 TIMES DAILY
Qty: 2 | Refills: 1 | Status: DISCONTINUED | COMMUNITY
Start: 2019-02-22 | End: 2021-11-22

## 2021-11-22 NOTE — HISTORY OF PRESENT ILLNESS
[de-identified] : 60F w/ DM, HTN, HLD, CAD s/p RCA stent 2017, asthma and sarcoidosis, presented for follow up for DM. \par Since last visit, clinic pharmacist has been trying to help patient get coverage for Freestyle Ginny for glucose monitoring. \par FS lo-100's fasting and 120-160's post-prandial per report\par Diet: Been eating more fruits including banana, pineapple, and drinking orange juice sometimes.\par Meds: Taking metformin 1000 mg BID; Basaglar 20-25 U BID, premeal 15U before breakfast and dinner (skipping lunch dose due to not eating much for lunch during work)\par Symptoms: None\par No interval change in health or medications otherwise.\par \par Patient has been seeing podiatry for b/l feet pain, and was advised to change shoes for comfort. It helped mildly, and patient realized she walks with toes in and legs internally rotated. The padding for feet helped a little too. 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 No AM stiffness, erythema, but has more swelling in all fingers. Pain is more so around the knuckles. Doesn't want to try any medications at this time. \par ROS otherwise negative.

## 2021-11-22 NOTE — PHYSICAL EXAM
[No Acute Distress] : no acute distress [Well Nourished] : well nourished [Well Developed] : well developed [Well-Appearing] : well-appearing [Normal Sclera/Conjunctiva] : normal sclera/conjunctiva [PERRL] : pupils equal round and reactive to light [EOMI] : extraocular movements intact [Normal Outer Ear/Nose] : the outer ears and nose were normal in appearance [Normal Oropharynx] : the oropharynx was normal [No JVD] : no jugular venous distention [No Lymphadenopathy] : no lymphadenopathy [Supple] : supple [Thyroid Normal, No Nodules] : the thyroid was normal and there were no nodules present [No Respiratory Distress] : no respiratory distress  [No Accessory Muscle Use] : no accessory muscle use [Clear to Auscultation] : lungs were clear to auscultation bilaterally [Normal Rate] : normal rate  [Regular Rhythm] : with a regular rhythm [Normal S1, S2] : normal S1 and S2 [No Murmur] : no murmur heard [No Carotid Bruits] : no carotid bruits [No Abdominal Bruit] : a ~M bruit was not heard ~T in the abdomen [No Varicosities] : no varicosities [Pedal Pulses Present] : the pedal pulses are present [No Edema] : there was no peripheral edema [No Palpable Aorta] : no palpable aorta [No Extremity Clubbing/Cyanosis] : no extremity clubbing/cyanosis [Soft] : abdomen soft [Non Tender] : non-tender [Non-distended] : non-distended [No Masses] : no abdominal mass palpated [No HSM] : no HSM [Normal Bowel Sounds] : normal bowel sounds [Normal Posterior Cervical Nodes] : no posterior cervical lymphadenopathy [Normal Anterior Cervical Nodes] : no anterior cervical lymphadenopathy [No CVA Tenderness] : no CVA  tenderness [No Spinal Tenderness] : no spinal tenderness [No Joint Swelling] : no joint swelling [Grossly Normal Strength/Tone] : grossly normal strength/tone [No Rash] : no rash [Coordination Grossly Intact] : coordination grossly intact [No Focal Deficits] : no focal deficits [Normal Gait] : normal gait [Deep Tendon Reflexes (DTR)] : deep tendon reflexes were 2+ and symmetric [Normal Insight/Judgement] : insight and judgment were intact [Normal Affect] : the affect was normal [] : both feet [de-identified] : (+) swelling on b/l fingers, no erythema, or limitation in ROM. Intact strength.  [de-identified] : (+) hardening of skin under the L 4th metatarsal head, no ulcers, wounds.  [TWNoteComboBox3] : +2

## 2021-11-29 DIAGNOSIS — H40.039 ANATOMICAL NARROW ANGLE, UNSPECIFIED EYE: ICD-10-CM

## 2021-11-29 DIAGNOSIS — E11.9 TYPE 2 DIABETES MELLITUS WITHOUT COMPLICATIONS: ICD-10-CM

## 2021-11-29 DIAGNOSIS — M77.41 METATARSALGIA, RIGHT FOOT: ICD-10-CM

## 2021-11-29 DIAGNOSIS — H25.13 AGE-RELATED NUCLEAR CATARACT, BILATERAL: ICD-10-CM

## 2022-01-31 ENCOUNTER — APPOINTMENT (OUTPATIENT)
Dept: INTERNAL MEDICINE | Facility: CLINIC | Age: 62
End: 2022-01-31

## 2022-04-04 ENCOUNTER — NON-APPOINTMENT (OUTPATIENT)
Age: 62
End: 2022-04-04

## 2022-04-04 ENCOUNTER — APPOINTMENT (OUTPATIENT)
Dept: CARDIOLOGY | Facility: CLINIC | Age: 62
End: 2022-04-04
Payer: MEDICAID

## 2022-04-04 VITALS
HEART RATE: 63 BPM | WEIGHT: 173 LBS | SYSTOLIC BLOOD PRESSURE: 106 MMHG | DIASTOLIC BLOOD PRESSURE: 65 MMHG | HEIGHT: 63 IN | OXYGEN SATURATION: 98 % | BODY MASS INDEX: 30.65 KG/M2

## 2022-04-04 PROCEDURE — 99214 OFFICE O/P EST MOD 30 MIN: CPT

## 2022-04-04 PROCEDURE — 93000 ELECTROCARDIOGRAM COMPLETE: CPT

## 2022-04-04 RX ORDER — SYRINGE AND NEEDLE,INSULIN,1ML 31 GX5/16"
31G X 5/16" SYRINGE, EMPTY DISPOSABLE MISCELLANEOUS
Qty: 100 | Refills: 0 | Status: DISCONTINUED | COMMUNITY
Start: 2021-08-11

## 2022-04-04 RX ORDER — CYCLOBENZAPRINE HYDROCHLORIDE 10 MG/1
10 TABLET, FILM COATED ORAL
Qty: 28 | Refills: 0 | Status: DISCONTINUED | COMMUNITY
Start: 2022-03-07

## 2022-04-17 NOTE — HISTORY OF PRESENT ILLNESS
[FreeTextEntry1] : 61 year old female with HTN controlled on meds, HLD stable on statins s/p episode of UA and cath with stent of RCA.  Pt feeling well since stent without CP, SOB or H/A

## 2022-04-17 NOTE — PHYSICAL EXAM
[Normal Appearance] : normal appearance [General Appearance - Well Developed] : well developed [Well Groomed] : well groomed [General Appearance - Well Nourished] : well nourished [General Appearance - In No Acute Distress] : no acute distress [No Deformities] : no deformities [Normal Conjunctiva] : the conjunctiva exhibited no abnormalities [Eyelids - No Xanthelasma] : the eyelids demonstrated no xanthelasmas [Normal Oral Mucosa] : normal oral mucosa [No Oral Pallor] : no oral pallor [No Oral Cyanosis] : no oral cyanosis [Normal Jugular Venous A Waves Present] : normal jugular venous A waves present [Normal Jugular Venous V Waves Present] : normal jugular venous V waves present [No Jugular Venous Early A Waves] : no jugular venous early A waves [Respiration, Rhythm And Depth] : normal respiratory rhythm and effort [Exaggerated Use Of Accessory Muscles For Inspiration] : no accessory muscle use [Heart Rate And Rhythm] : heart rate and rhythm were normal [Auscultation Breath Sounds / Voice Sounds] : lungs were clear to auscultation bilaterally [Heart Sounds] : normal S1 and S2 [Murmurs] : no murmurs present [Abdomen Soft] : soft [Abdomen Tenderness] : non-tender [Abdomen Mass (___ Cm)] : no abdominal mass palpated [Abnormal Walk] : normal gait [Gait - Sufficient For Exercise Testing] : the gait was sufficient for exercise testing [Nail Clubbing] : no clubbing of the fingernails [Cyanosis, Localized] : no localized cyanosis [Petechial Hemorrhages (___cm)] : no petechial hemorrhages [Skin Color & Pigmentation] : normal skin color and pigmentation [] : no rash [Skin Lesions] : no skin lesions [No Venous Stasis] : no venous stasis [No Skin Ulcers] : no skin ulcer [No Xanthoma] : no  xanthoma was observed [Oriented To Time, Place, And Person] : oriented to person, place, and time [Mood] : the mood was normal [Affect] : the affect was normal [No Anxiety] : not feeling anxious

## 2022-04-17 NOTE — DISCUSSION/SUMMARY
[FreeTextEntry1] : CAD- stable s/p RCA stent\par \par HTN- controlled\par \par HLD- stable\par \par Followup in 6 mths\par \par Time spent reviewing history, exam, pt discussion, medicine notes and note writing

## 2022-06-08 ENCOUNTER — APPOINTMENT (OUTPATIENT)
Dept: INTERNAL MEDICINE | Facility: CLINIC | Age: 62
End: 2022-06-08
Payer: MEDICAID

## 2022-06-08 ENCOUNTER — RESULT REVIEW (OUTPATIENT)
Age: 62
End: 2022-06-08

## 2022-06-08 ENCOUNTER — OUTPATIENT (OUTPATIENT)
Dept: OUTPATIENT SERVICES | Facility: HOSPITAL | Age: 62
LOS: 1 days | End: 2022-06-08
Payer: MEDICAID

## 2022-06-08 VITALS
WEIGHT: 173 LBS | DIASTOLIC BLOOD PRESSURE: 78 MMHG | OXYGEN SATURATION: 99 % | BODY MASS INDEX: 30.65 KG/M2 | HEART RATE: 79 BPM | SYSTOLIC BLOOD PRESSURE: 122 MMHG | HEIGHT: 63 IN

## 2022-06-08 DIAGNOSIS — Z98.89 OTHER SPECIFIED POSTPROCEDURAL STATES: Chronic | ICD-10-CM

## 2022-06-08 DIAGNOSIS — E87.5 HYPERKALEMIA: ICD-10-CM

## 2022-06-08 DIAGNOSIS — E11.3299 TYPE 2 DIABETES MELLITUS WITH MILD NONPROLIFERATIVE DIABETIC RETINOPATHY WITHOUT MACULAR EDEMA, UNSPECIFIED EYE: ICD-10-CM

## 2022-06-08 DIAGNOSIS — I10 ESSENTIAL (PRIMARY) HYPERTENSION: ICD-10-CM

## 2022-06-08 PROCEDURE — 99396 PREV VISIT EST AGE 40-64: CPT | Mod: GC

## 2022-06-08 PROCEDURE — 84443 ASSAY THYROID STIM HORMONE: CPT

## 2022-06-08 PROCEDURE — 87389 HIV-1 AG W/HIV-1&-2 AB AG IA: CPT

## 2022-06-08 PROCEDURE — 80061 LIPID PANEL: CPT

## 2022-06-08 PROCEDURE — 83036 HEMOGLOBIN GLYCOSYLATED A1C: CPT

## 2022-06-08 PROCEDURE — 85025 COMPLETE CBC W/AUTO DIFF WBC: CPT

## 2022-06-08 PROCEDURE — 80053 COMPREHEN METABOLIC PANEL: CPT

## 2022-06-08 PROCEDURE — G0463: CPT | Mod: 25

## 2022-06-08 RX ORDER — FLASH GLUCOSE SENSOR
KIT MISCELLANEOUS
Qty: 1 | Refills: 0 | Status: ACTIVE | COMMUNITY
Start: 2021-11-29

## 2022-06-08 RX ORDER — SYRINGE-NEEDLE,INSULIN,0.5 ML 31 GX5/16"
31G X 5/16" SYRINGE, EMPTY DISPOSABLE MISCELLANEOUS
Qty: 3 | Refills: 1 | Status: COMPLETED | COMMUNITY
Start: 2020-06-12 | End: 2022-06-08

## 2022-06-08 RX ORDER — BLOOD-GLUCOSE METER
W/DEVICE KIT MISCELLANEOUS
Qty: 1 | Refills: 0 | Status: COMPLETED | COMMUNITY
Start: 2018-01-31 | End: 2022-06-08

## 2022-06-08 RX ORDER — INSULIN LISPRO 100 U/ML
100 INJECTION, SOLUTION INTRAVENOUS; SUBCUTANEOUS
Qty: 5 | Refills: 2 | Status: COMPLETED | COMMUNITY
Start: 2020-11-03 | End: 2022-06-08

## 2022-06-09 NOTE — PHYSICAL EXAM
[Normal Sclera/Conjunctiva] : normal sclera/conjunctiva [PERRL] : pupils equal round and reactive to light [EOMI] : extraocular movements intact [Normal Outer Ear/Nose] : the outer ears and nose were normal in appearance [Normal Oropharynx] : the oropharynx was normal [Normal TMs] : both tympanic membranes were normal [Normal Nasal Mucosa] : the nasal mucosa was normal [No JVD] : no jugular venous distention [No Lymphadenopathy] : no lymphadenopathy [Supple] : supple [No Respiratory Distress] : no respiratory distress  [No Accessory Muscle Use] : no accessory muscle use [Clear to Auscultation] : lungs were clear to auscultation bilaterally [Normal Rate] : normal rate  [Regular Rhythm] : with a regular rhythm [Normal S1, S2] : normal S1 and S2 [No Murmur] : no murmur heard [No Carotid Bruits] : no carotid bruits [No Varicosities] : no varicosities [Pedal Pulses Present] : the pedal pulses are present [No Edema] : there was no peripheral edema [No Extremity Clubbing/Cyanosis] : no extremity clubbing/cyanosis [Normal Appearance] : normal in appearance [No Nipple Discharge] : no nipple discharge [No Axillary Lymphadenopathy] : no axillary lymphadenopathy [Soft] : abdomen soft [Non Tender] : non-tender [Non-distended] : non-distended [No Masses] : no abdominal mass palpated [Normal Bowel Sounds] : normal bowel sounds [Normal Posterior Cervical Nodes] : no posterior cervical lymphadenopathy [Normal Anterior Cervical Nodes] : no anterior cervical lymphadenopathy [No CVA Tenderness] : no CVA  tenderness [No Spinal Tenderness] : no spinal tenderness [No Rash] : no rash [Normal] : affect was normal and insight and judgment were intact

## 2022-06-09 NOTE — COUNSELING
[Adequate lighting] : Adequate lighting [No throw rugs] : No throw rugs [Use proper foot wear] : Use proper foot wear [Behavioral health counseling provided] : Behavioral health counseling provided [Sleep ___ hours/day] : Sleep [unfilled] hours/day [Engage in a relaxing activity] : Engage in a relaxing activity [Potential consequences of obesity discussed] : Potential consequences of obesity discussed [Benefits of weight loss discussed] : Benefits of weight loss discussed [Encouraged to maintain food diary] : Encouraged to maintain food diary [Encouraged to increase physical activity] : Encouraged to increase physical activity [Encouraged to use exercise tracking device] : Encouraged to use exercise tracking device [Decrease Portions] : decrease portions [____ min/wk Activity] : [unfilled] min/wk activity [Keep Food Diary] : keep food diary [None] : None

## 2022-06-10 PROBLEM — E87.5 HYPERKALEMIA: Status: ACTIVE | Noted: 2022-06-10

## 2022-06-10 LAB
ALBUMIN SERPL ELPH-MCNC: 5 G/DL
ALP BLD-CCNC: 68 U/L
ALT SERPL-CCNC: 20 U/L
ANION GAP SERPL CALC-SCNC: 12 MMOL/L
AST SERPL-CCNC: 25 U/L
BASOPHILS # BLD AUTO: 0.06 K/UL
BASOPHILS NFR BLD AUTO: 0.8 %
BILIRUB SERPL-MCNC: 0.3 MG/DL
BUN SERPL-MCNC: 15 MG/DL
CALCIUM SERPL-MCNC: 10.4 MG/DL
CHLORIDE SERPL-SCNC: 98 MMOL/L
CHOLEST SERPL-MCNC: 145 MG/DL
CO2 SERPL-SCNC: 27 MMOL/L
CREAT SERPL-MCNC: 0.78 MG/DL
EGFR: 86 ML/MIN/1.73M2
EOSINOPHIL # BLD AUTO: 0.11 K/UL
EOSINOPHIL NFR BLD AUTO: 1.4 %
ESTIMATED AVERAGE GLUCOSE: 189 MG/DL
GLUCOSE SERPL-MCNC: 174 MG/DL
HBA1C MFR BLD HPLC: 7.8
HBA1C MFR BLD HPLC: 8.2 %
HCT VFR BLD CALC: 39.9 %
HDLC SERPL-MCNC: 71 MG/DL
HGB BLD-MCNC: 12.5 G/DL
HIV1+2 AB SPEC QL IA.RAPID: NONREACTIVE
IMM GRANULOCYTES NFR BLD AUTO: 0.8 %
LDLC SERPL CALC-MCNC: 56 MG/DL
LYMPHOCYTES # BLD AUTO: 2.95 K/UL
LYMPHOCYTES NFR BLD AUTO: 38.8 %
MAN DIFF?: NORMAL
MCHC RBC-ENTMCNC: 28.3 PG
MCHC RBC-ENTMCNC: 31.3 GM/DL
MCV RBC AUTO: 90.3 FL
MONOCYTES # BLD AUTO: 0.59 K/UL
MONOCYTES NFR BLD AUTO: 7.8 %
NEUTROPHILS # BLD AUTO: 3.84 K/UL
NEUTROPHILS NFR BLD AUTO: 50.4 %
NONHDLC SERPL-MCNC: 74 MG/DL
PLATELET # BLD AUTO: 263 K/UL
POTASSIUM SERPL-SCNC: 5.7 MMOL/L
PROT SERPL-MCNC: 7.4 G/DL
RBC # BLD: 4.42 M/UL
RBC # FLD: 12.2 %
SODIUM SERPL-SCNC: 136 MMOL/L
TRIGL SERPL-MCNC: 88 MG/DL
TSH SERPL-ACNC: 0.78 UIU/ML
WBC # FLD AUTO: 7.61 K/UL

## 2022-06-12 ENCOUNTER — NON-APPOINTMENT (OUTPATIENT)
Age: 62
End: 2022-06-12

## 2022-06-12 NOTE — HEALTH RISK ASSESSMENT
[Good] : ~his/her~  mood as  good [Never] : Never [0-4] : 0-4 [Never (0 pts)] : Never (0 points) [No] : In the past 12 months have you used drugs other than those required for medical reasons? No [No falls in past year] : Patient reported no falls in the past year [0] : 2) Feeling down, depressed, or hopeless: Not at all (0) [PHQ-2 Negative - No further assessment needed] : PHQ-2 Negative - No further assessment needed [No Retinopathy] : No retinopathy [HIV Test offered] : HIV Test offered [None] : None [] :  [Feels Safe at Home] : Feels safe at home [Fully functional (bathing, dressing, toileting, transferring, walking, feeding)] : Fully functional (bathing, dressing, toileting, transferring, walking, feeding) [Fully functional (using the telephone, shopping, preparing meals, housekeeping, doing laundry, using] : Fully functional and needs no help or supervision to perform IADLs (using the telephone, shopping, preparing meals, housekeeping, doing laundry, using transportation, managing medications and managing finances) [Reports normal functional visual acuity (ie: able to read med bottle)] : Reports normal functional visual acuity [Seat Belt] :  uses seat belt [Sunscreen] : uses sunscreen [1 or 2 (0 pts)] : 1 or 2 (0 points) [Patient reported mammogram was normal] : Patient reported mammogram was normal [Patient reported colonoscopy was abnormal] : Patient reported colonoscopy was abnormal [Hepatitis C test offered] : Hepatitis C test offered [With Significant Other] : lives with significant other [# of Members in Household ___] :  household currently consist of [unfilled] member(s) [Employed] : employed [Audit-CScore] : 0 [de-identified] : 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) [de-identified] : fruits and veggies, minimal meat  [[id number]] : 0 [EyeExamDate] : 03/22 [Change in mental status noted] : No change in mental status noted [Sexually Active] : not sexually active [High Risk Behavior] : no high risk behavior [Reports changes in hearing] : Reports no changes in hearing [Reports changes in vision] : Reports no changes in vision [Reports changes in dental health] : Reports no changes in dental health [Guns at Home] : no guns at home [MammogramDate] : 11/21 [MammogramComments] : BIRADS 2, next due Nov 2022 [BoneDensityComments] : f [ColonoscopyDate] : 12/20 [ColonoscopyComments] : internal hem, diverticulosis, and polyps, due 3-5 y [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

## 2022-06-12 NOTE — REVIEW OF SYSTEMS
[Negative] : Integumentary [Fever] : no fever [Chills] : no chills [Fatigue] : no fatigue [Discharge] : no discharge [Pain] : no pain [Headache] : no headache [Dizziness] : no dizziness [Fainting] : no fainting [FreeTextEntry9] : BL ball of the foot pain

## 2022-06-12 NOTE — ASSESSMENT
[FreeTextEntry1] : 61y F PMHx Type 2 DM , HTN, HLD, CAD s/p RCA stent, asthma presenting for CPE \par \par - *Pt will f/u via telehealth visit in on 6/20 to review her diary with BGL and daily meals. Review if the change in ademlog units is good (10-15-15) or if it needs to be changed again.

## 2022-06-12 NOTE — HISTORY OF PRESENT ILLNESS
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 She is on her feet throughout the day. 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 The pt has a history of being a picky eater and has either had a small breakfast and skipped lunch and pre-meal before lunch. For example, yesterday for breakfast the patient had boil egg, banana and orange juice. She does not like the smell of the meat and state she is a \par "finicky eater." Her sugars range from 65 and 75 up to 250. She tries to be regular with her glucose checks, but states it is difficult with her job. \par \par The patient also has a hx of BL foot pain and has changed her shoes for comfort over the past few years. She also had a customized cushion for the ball of her feet, but now its worn out and requesting to see the podiatrist for another customized shoe cushion. The foot pain is described as an achy feeling at the ball of her foot worsened with standing and ambulation. Improved with rest and ice. \par \par Otherwise, pt is smiling and talkative in the room. States there is stress, but denies any SI/HI. No CP, SOB, abdominal pain, N/V/D, fever or chills.

## 2022-06-12 NOTE — PLAN
[FreeTextEntry1] : \par #T2DM\par - POC A1C today 7.8%  and serum A1C 8.4% (previously 8.3%)\par - Pt with BGL 65/75 - 250s, irregular breakfast/lunch meals and skips lunch premeal admelog\par - Changed admelog from 15-15-15 to 10-15-15 and instructions to skip lunch premeal if pt did not eat any lunch\par - Follows with ophthalmologist - UTD on eye exam March 2022\par - Follows with podiatrist - sent referral \par - Educated regarding regular meals and proper diet and management of hypoglycemia\par - Nutrition and Pharmacy assistance for meal planning and insulin management education\par - f/u with diabetes education, tasked Dr. Bone \par - *Pt will f/u via telehealth visit in on 6/20 to review her diary with BGL and daily meals. Review if the change in ademlog units is good (10-15-15) or if it needs to be changed again. \par \par #Metatarsalgia/BL foot pain \par - hx of metatarsalgia , prior PT and podiatry eval.\par - pt requesting podiatry referral for customized padding in shoes\par - pt c/o pain despite no relief with changing shoes or stretching PT stretches \par - referred to podiatry for foot pain and hx of DM \par \par #HTN:\par - normotensive today 122/78\par - c/w lisinopril 10 mg qd\par - c/w metoprolol succinate 25 mg qd\par \par #CAD s/p RCA PCI\par - c/w ASA 81 mg qd\par \par #HLD:\par - f/u lipid panel \par - c/w atorvastatin 40 mg qd\par \par #asthma:\par - c/w albuterol prn \par \par \par #Foot pain \par - hx of metatarsalagia \par - f/u podiatry referral \par \par #HCM \par - f/u routine labs: CBC,CMP, A1C, HIV, lipid, TSH,\par - Mammogram BIRADS 2 (11/2021) due for November 2022, order ready \par - Colonoscopy (12/2020) w/hemorrhoids, polyps, and diverticulosis, repeat in 3-5y\par - vaccinations UTD \par \par Patient seen and discussed with Dr. Cannon\par \par *Pt will f/u via telehealth visit in on 6/20 to review her diary with BGL and daily meals. Review if the change in ademlog units is good (10-15-15) or if it needs to be changed again.

## 2022-06-17 DIAGNOSIS — I25.10 ATHEROSCLEROTIC HEART DISEASE OF NATIVE CORONARY ARTERY WITHOUT ANGINA PECTORIS: ICD-10-CM

## 2022-06-17 DIAGNOSIS — Z00.00 ENCOUNTER FOR GENERAL ADULT MEDICAL EXAMINATION WITHOUT ABNORMAL FINDINGS: ICD-10-CM

## 2022-06-17 DIAGNOSIS — E78.5 HYPERLIPIDEMIA, UNSPECIFIED: ICD-10-CM

## 2022-06-17 DIAGNOSIS — M77.41 METATARSALGIA, RIGHT FOOT: ICD-10-CM

## 2022-06-20 ENCOUNTER — NON-APPOINTMENT (OUTPATIENT)
Age: 62
End: 2022-06-20

## 2022-06-20 ENCOUNTER — APPOINTMENT (OUTPATIENT)
Dept: INTERNAL MEDICINE | Facility: CLINIC | Age: 62
End: 2022-06-20

## 2022-06-22 ENCOUNTER — APPOINTMENT (OUTPATIENT)
Dept: PODIATRY | Facility: HOSPITAL | Age: 62
End: 2022-06-22
Payer: MEDICAID

## 2022-06-22 ENCOUNTER — RESULT REVIEW (OUTPATIENT)
Age: 62
End: 2022-06-22

## 2022-06-22 ENCOUNTER — OUTPATIENT (OUTPATIENT)
Dept: OUTPATIENT SERVICES | Facility: HOSPITAL | Age: 62
LOS: 1 days | End: 2022-06-22
Payer: MEDICAID

## 2022-06-22 VITALS
RESPIRATION RATE: 16 BRPM | TEMPERATURE: 98 F | DIASTOLIC BLOOD PRESSURE: 77 MMHG | WEIGHT: 173 LBS | BODY MASS INDEX: 30.65 KG/M2 | SYSTOLIC BLOOD PRESSURE: 130 MMHG | HEART RATE: 80 BPM | HEIGHT: 63 IN

## 2022-06-22 DIAGNOSIS — M79.609 PAIN IN UNSPECIFIED LIMB: ICD-10-CM

## 2022-06-22 DIAGNOSIS — M24.573 CONTRACTURE, UNSPECIFIED ANKLE: ICD-10-CM

## 2022-06-22 DIAGNOSIS — Q66.89 OTHER SPECIFIED CONGENITAL DEFORMITIES OF FEET: ICD-10-CM

## 2022-06-22 DIAGNOSIS — Z98.89 OTHER SPECIFIED POSTPROCEDURAL STATES: Chronic | ICD-10-CM

## 2022-06-22 DIAGNOSIS — M77.41 METATARSALGIA, RIGHT FOOT: ICD-10-CM

## 2022-06-22 PROCEDURE — 73630 X-RAY EXAM OF FOOT: CPT

## 2022-06-22 PROCEDURE — 73630 X-RAY EXAM OF FOOT: CPT | Mod: 26,50

## 2022-06-22 PROCEDURE — 99212 OFFICE O/P EST SF 10 MIN: CPT

## 2022-06-22 PROCEDURE — G0463: CPT

## 2022-06-22 NOTE — ASSESSMENT
[FreeTextEntry1] : 62 y/o F w/ bilateral foot pain\par - submetatarsal 4 hyperkeratosis b/l, ankle equinus, fat pad atrophy b/l, forefoot varus b/l\par - Debridement of hyperkeratotic lesions b/l at sub met 4 to level of dermis, pt tolerated well without complications\par - Rec'd pinnacle powerstep OTC insoles for offloading of forefoot, decrease pain\par - Gave paperwork for diabetic shoes to be completed by PCP\par - Rx'd b/l foot XR to assess biomechanical etiology of foot pain\par - Pt to rtc 2 weeks with completed xrays and forms completed by PCP

## 2022-06-22 NOTE — PHYSICAL EXAM
[General Appearance - Alert] : alert [General Appearance - In No Acute Distress] : in no acute distress [2+] : left foot dorsalis pedis 2+ [No Joint Swelling] : no joint swelling [Skin Color & Pigmentation] : normal skin color and pigmentation [Skin Turgor] : normal skin turgor [Skin Lesions] : no skin lesions [Ankle Swelling (On Exam)] : not present [Varicose Veins Of Lower Extremities] : not present [] : not present [Delayed in the Right Toes] : capillary refills normal in right toes [Delayed in the Left Toes] : capillary refills normal in the left toes [Fede's Test For Radial Artery Patency Bilaterally] : negative bilateral [de-identified] : Ankle ROM: -5 degrees ankle dorsiflexion b/l\par STJ ROM: increased inversion 3:1 inversion/eversion b/l\par MTJ ROM: WNL\par 1st ray ROM: WNL R, increased dorsiflexion L 1cm [Foot Ulcer] : no foot ulcer [Skin Induration] : no skin induration [FreeTextEntry1] : Sub metatarsal 4 hyperkeratosis b/l

## 2022-06-22 NOTE — HISTORY OF PRESENT ILLNESS
[Stiff Soled Shoes] : stiff soled shoes [FreeTextEntry1] : 60 y/o F with PMHx of DM referred to podiatry clinic by her PCP. Pt reports chronic bilateral plantar foot pain that has been mildly improved with the use of metatarsal pads made by a previous podiatrist years ago. Pt relates that the right foot pain has been increasing for over the last month, no history of trauma, no history of wounds. Pt wishes to discuss potentially getting diabetic shoes. Pt denies N/V/F/D/SOB\par \par 6/5/22 A1c: 8.6%

## 2022-06-23 ENCOUNTER — NON-APPOINTMENT (OUTPATIENT)
Age: 62
End: 2022-06-23

## 2022-06-23 LAB
ANION GAP SERPL CALC-SCNC: 15 MMOL/L
BUN SERPL-MCNC: 19 MG/DL
CALCIUM SERPL-MCNC: 10 MG/DL
CHLORIDE SERPL-SCNC: 101 MMOL/L
CO2 SERPL-SCNC: 24 MMOL/L
CREAT SERPL-MCNC: 0.79 MG/DL
EGFR: 85 ML/MIN/1.73M2
GLUCOSE SERPL-MCNC: 159 MG/DL
POTASSIUM SERPL-SCNC: 5.4 MMOL/L
SODIUM SERPL-SCNC: 140 MMOL/L

## 2022-06-24 NOTE — ED ADULT NURSE NOTE - CHIEF COMPLAINT
Called patient to let him know Dr Little Swift' stated his EKG look fine. The patient is a 56y Female complaining of shortness of breath.

## 2022-07-05 ENCOUNTER — RX RENEWAL (OUTPATIENT)
Age: 62
End: 2022-07-05

## 2022-07-06 ENCOUNTER — OUTPATIENT (OUTPATIENT)
Dept: OUTPATIENT SERVICES | Facility: HOSPITAL | Age: 62
LOS: 1 days | End: 2022-07-06
Payer: MEDICAID

## 2022-07-06 ENCOUNTER — APPOINTMENT (OUTPATIENT)
Dept: PODIATRY | Facility: HOSPITAL | Age: 62
End: 2022-07-06

## 2022-07-06 VITALS
WEIGHT: 174 LBS | SYSTOLIC BLOOD PRESSURE: 124 MMHG | DIASTOLIC BLOOD PRESSURE: 83 MMHG | TEMPERATURE: 96.1 F | HEART RATE: 96 BPM | HEIGHT: 63 IN | BODY MASS INDEX: 30.83 KG/M2

## 2022-07-06 DIAGNOSIS — Q66.89 OTHER SPECIFIED CONGENITAL DEFORMITIES OF FEET: ICD-10-CM

## 2022-07-06 DIAGNOSIS — M77.41 METATARSALGIA, RIGHT FOOT: ICD-10-CM

## 2022-07-06 DIAGNOSIS — M24.573 CONTRACTURE, UNSPECIFIED ANKLE: ICD-10-CM

## 2022-07-06 DIAGNOSIS — Z98.89 OTHER SPECIFIED POSTPROCEDURAL STATES: Chronic | ICD-10-CM

## 2022-07-06 DIAGNOSIS — M79.609 PAIN IN UNSPECIFIED LIMB: ICD-10-CM

## 2022-07-06 PROCEDURE — G0463: CPT

## 2022-07-06 PROCEDURE — 99212 OFFICE O/P EST SF 10 MIN: CPT

## 2022-07-06 NOTE — ASSESSMENT
[FreeTextEntry1] : 62 y/o F w/ bilateral foot pain\par - pt presents to dropoff medical forms, not medically evaluated today\par - Gave paperwork for diabetic shoes to be completed by PCP\par Rx: diabetic depth inlay shoes with multidensity orthotics\par - Pt to rtc 2 weeks

## 2022-07-06 NOTE — PHYSICAL EXAM
[General Appearance - Alert] : alert [General Appearance - In No Acute Distress] : in no acute distress [2+] : left foot dorsalis pedis 2+ [No Joint Swelling] : no joint swelling [Skin Color & Pigmentation] : normal skin color and pigmentation [Skin Turgor] : normal skin turgor [Skin Lesions] : no skin lesions [Ankle Swelling (On Exam)] : not present [Varicose Veins Of Lower Extremities] : not present [] : not present [Delayed in the Right Toes] : capillary refills normal in right toes [Delayed in the Left Toes] : capillary refills normal in the left toes [Fede's Test For Radial Artery Patency Bilaterally] : negative bilateral [de-identified] : Ankle ROM: -5 degrees ankle dorsiflexion b/l\par STJ ROM: increased inversion 3:1 inversion/eversion b/l\par MTJ ROM: WNL\par 1st ray ROM: WNL R, increased dorsiflexion L 1cm [Foot Ulcer] : no foot ulcer [Skin Induration] : no skin induration [FreeTextEntry1] : Sub metatarsal 4 hyperkeratosis b/l

## 2022-07-11 ENCOUNTER — APPOINTMENT (OUTPATIENT)
Dept: INTERNAL MEDICINE | Facility: CLINIC | Age: 62
End: 2022-07-11

## 2022-07-11 ENCOUNTER — NON-APPOINTMENT (OUTPATIENT)
Age: 62
End: 2022-07-11

## 2022-07-13 RX ORDER — FLASH GLUCOSE SCANNING READER
EACH MISCELLANEOUS
Qty: 1 | Refills: 0 | Status: ACTIVE | COMMUNITY
Start: 2020-11-04 | End: 1900-01-01

## 2022-07-14 ENCOUNTER — NON-APPOINTMENT (OUTPATIENT)
Age: 62
End: 2022-07-14

## 2022-07-18 ENCOUNTER — NON-APPOINTMENT (OUTPATIENT)
Age: 62
End: 2022-07-18

## 2022-07-20 RX ORDER — FLASH GLUCOSE SENSOR
KIT MISCELLANEOUS
Qty: 1 | Refills: 0 | Status: ACTIVE | COMMUNITY
Start: 2021-11-29 | End: 1900-01-01

## 2022-08-01 ENCOUNTER — NON-APPOINTMENT (OUTPATIENT)
Age: 62
End: 2022-08-01

## 2022-09-14 RX ORDER — MULTIVITAMIN
TABLET ORAL DAILY
Qty: 30 | Refills: 4 | Status: ACTIVE | COMMUNITY
Start: 1900-01-01 | End: 1900-01-01

## 2022-09-28 ENCOUNTER — APPOINTMENT (OUTPATIENT)
Dept: INTERNAL MEDICINE | Facility: CLINIC | Age: 62
End: 2022-09-28

## 2022-09-28 ENCOUNTER — OUTPATIENT (OUTPATIENT)
Dept: OUTPATIENT SERVICES | Facility: HOSPITAL | Age: 62
LOS: 1 days | End: 2022-09-28
Payer: MEDICAID

## 2022-09-28 ENCOUNTER — RESULT CHARGE (OUTPATIENT)
Age: 62
End: 2022-09-28

## 2022-09-28 VITALS
SYSTOLIC BLOOD PRESSURE: 124 MMHG | WEIGHT: 171 LBS | OXYGEN SATURATION: 99 % | HEIGHT: 63 IN | HEART RATE: 96 BPM | BODY MASS INDEX: 30.3 KG/M2 | DIASTOLIC BLOOD PRESSURE: 80 MMHG

## 2022-09-28 DIAGNOSIS — Z98.89 OTHER SPECIFIED POSTPROCEDURAL STATES: Chronic | ICD-10-CM

## 2022-09-28 DIAGNOSIS — I10 ESSENTIAL (PRIMARY) HYPERTENSION: ICD-10-CM

## 2022-09-28 LAB — HBA1C MFR BLD HPLC: 8.7

## 2022-09-28 PROCEDURE — 90688 IIV4 VACCINE SPLT 0.5 ML IM: CPT

## 2022-09-28 PROCEDURE — G0008: CPT

## 2022-09-28 PROCEDURE — G0463: CPT | Mod: 25

## 2022-09-28 PROCEDURE — 83036 HEMOGLOBIN GLYCOSYLATED A1C: CPT

## 2022-09-28 PROCEDURE — 99213 OFFICE O/P EST LOW 20 MIN: CPT | Mod: GE

## 2022-09-28 NOTE — HISTORY OF PRESENT ILLNESS
[FreeTextEntry1] : f/u of T2DM [de-identified] : 61y F PMHx Type 2 DM , HTN, HLD, CAD s/p RCA stent, and asthma presenting for diabetes f/u. Patient states she feels well overall. Per discussion with Dr. Altman, patient would first like to see endocrinology before starting Ozempic. Currently on Basaglar 22u am and 22u pm, Admelog 16u am and 16pm. Patient had not decreased to  10u in the am. Patient has small, varied meals. Pt does not have log book; does not check everyday/. Checks 1-2x/ week and states typically 155- 165. Pt would like a sherry at this time; awaiting endocrinology. Diet consist of- brunch -coffee, banana, egg; dinner - pasta, salad. Occasionally reports shaking/. tremors - but has not experienced over the past month.

## 2022-09-28 NOTE — ED PROVIDER NOTE - TEMPLATE, MLM
Respiratory Electrodesiccation Text: The wound bed was treated with electrodesiccation after the biopsy was performed.

## 2022-09-28 NOTE — PLAN
[FreeTextEntry1] : 61y F PMHx Type 2 DM , HTN, HLD, CAD s/p RCA stent, and asthma presenting for diabetes f/u.\par \par # T2DM\par - instructed patient to adjust admelog and Basaglar given previous concern for hypoglycemia. Pt had not adjusted doses as requested previously. Changed Admelog to 15u with large meals and 8u with small meals. Basaglar changed to 30u evening \par - pending endocrinology appt next month for [facility] ([facility] unable to Rx to pt d/t insurance issues)\par - continue to monitor glucose levels and log - f/u with clinic (telehealth visit) in 2 weeks. May need to readjust insulin\par -ozempic would be good choice given obesity but pt wanting to wait for endocrinology visit to start.

## 2022-09-28 NOTE — PHYSICAL EXAM
[Normal Sclera/Conjunctiva] : normal sclera/conjunctiva [Normal Outer Ear/Nose] : the outer ears and nose were normal in appearance [Supple] : supple [No Respiratory Distress] : no respiratory distress  [No Accessory Muscle Use] : no accessory muscle use [Clear to Auscultation] : lungs were clear to auscultation bilaterally [Normal Rate] : normal rate  [Regular Rhythm] : with a regular rhythm [Soft] : abdomen soft [Non Tender] : non-tender [Non-distended] : non-distended [Grossly Normal Strength/Tone] : grossly normal strength/tone [No Focal Deficits] : no focal deficits [Normal Insight/Judgement] : insight and judgment were intact

## 2022-10-03 DIAGNOSIS — Z11.52 ENCOUNTER FOR SCREENING FOR COVID-19: ICD-10-CM

## 2022-10-04 DIAGNOSIS — E11.9 TYPE 2 DIABETES MELLITUS WITHOUT COMPLICATIONS: ICD-10-CM

## 2022-10-04 DIAGNOSIS — Z23 ENCOUNTER FOR IMMUNIZATION: ICD-10-CM

## 2022-10-10 ENCOUNTER — APPOINTMENT (OUTPATIENT)
Dept: CARDIOLOGY | Facility: CLINIC | Age: 62
End: 2022-10-10

## 2022-10-12 ENCOUNTER — APPOINTMENT (OUTPATIENT)
Dept: INTERNAL MEDICINE | Facility: CLINIC | Age: 62
End: 2022-10-12

## 2022-10-12 ENCOUNTER — OUTPATIENT (OUTPATIENT)
Dept: OUTPATIENT SERVICES | Facility: HOSPITAL | Age: 62
LOS: 1 days | End: 2022-10-12
Payer: MEDICAID

## 2022-10-12 ENCOUNTER — NON-APPOINTMENT (OUTPATIENT)
Age: 62
End: 2022-10-12

## 2022-10-12 DIAGNOSIS — Z98.89 OTHER SPECIFIED POSTPROCEDURAL STATES: Chronic | ICD-10-CM

## 2022-10-12 DIAGNOSIS — E11.9 TYPE 2 DIABETES MELLITUS WITHOUT COMPLICATIONS: ICD-10-CM

## 2022-10-12 DIAGNOSIS — E66.9 OBESITY, UNSPECIFIED: ICD-10-CM

## 2022-10-12 PROCEDURE — ZZZZZ: CPT

## 2022-10-12 PROCEDURE — G0463: CPT

## 2022-10-12 NOTE — ASSESSMENT
[FreeTextEntry1] : Pt not checking FS during work so limited amount of data. also did not make changes to insulin. Prefers to f/u with endocrinology. Suggested ozempic, again wants to wait for endocrinology. would benefit from CGM given cannot check FS at work.

## 2022-10-12 NOTE — HISTORY OF PRESENT ILLNESS
[FreeTextEntry1] : Diabetes follow up  [de-identified] : Note: originally scheduled for V_TEB; however, patient had difficulty with the technology and requested to speak over the phone. Appt therefore, only audio.\par \par 61y F PMHx Type 2 DM , HTN, HLD, CAD s/p RCA stent, and asthma presenting for diabetes f/u. At last visit with Dr. Galvez, she was recommended to change her regimen to Basaglar 30U qhs and Admelog to 15u with large meals and 8u with small meals. \par \par She states that she has been scared to make the changes that we recommended and wants to defer to endocrine for further adjustments. She is currently on the old regimen 22U basaglar twice a day and 16U ademelog before lunch and dinner. 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 She provided me the log for the past week. \par \par \par 10/02: 268, 87, 298\par 10/03: 115,   \par 10/05: 222,\par \par 10/8:  180,  \par 10/9:  88 ,  , 124\par 10/10: 148\par 10/11: 4AM (76)-> after eating-> 8:00 (211)\par 10/12: 76 \par \par Patient reports sometimes, she feels mild tremors, shaking. When that happens, she eats something very quickly as she is always around food. When her AM FS is low, she skips her insulins entirely. She has an appt with endocrine on 09/25 and again expressed that she does not want to make any changes until then.

## 2022-10-13 DIAGNOSIS — I10 ESSENTIAL (PRIMARY) HYPERTENSION: ICD-10-CM

## 2022-10-24 ENCOUNTER — NON-APPOINTMENT (OUTPATIENT)
Age: 62
End: 2022-10-24

## 2022-10-24 ENCOUNTER — APPOINTMENT (OUTPATIENT)
Dept: CARDIOLOGY | Facility: CLINIC | Age: 62
End: 2022-10-24

## 2022-10-24 VITALS
DIASTOLIC BLOOD PRESSURE: 76 MMHG | HEART RATE: 73 BPM | HEIGHT: 63 IN | BODY MASS INDEX: 30.83 KG/M2 | OXYGEN SATURATION: 100 % | SYSTOLIC BLOOD PRESSURE: 115 MMHG | WEIGHT: 174 LBS

## 2022-10-24 PROCEDURE — 99214 OFFICE O/P EST MOD 30 MIN: CPT | Mod: 25

## 2022-10-24 PROCEDURE — 93000 ELECTROCARDIOGRAM COMPLETE: CPT

## 2022-10-24 RX ORDER — FLASH GLUCOSE SCANNING READER
EACH MISCELLANEOUS
Qty: 1 | Refills: 0 | Status: DISCONTINUED | COMMUNITY
Start: 2022-07-20 | End: 2022-10-24

## 2022-10-24 RX ORDER — FLASH GLUCOSE SENSOR
KIT MISCELLANEOUS
Qty: 1 | Refills: 0 | Status: DISCONTINUED | COMMUNITY
Start: 2022-07-18 | End: 2022-10-24

## 2022-10-26 ENCOUNTER — APPOINTMENT (OUTPATIENT)
Dept: PODIATRY | Facility: HOSPITAL | Age: 62
End: 2022-10-26

## 2022-10-26 ENCOUNTER — APPOINTMENT (OUTPATIENT)
Dept: ENDOCRINOLOGY | Facility: CLINIC | Age: 62
End: 2022-10-26

## 2022-10-26 PROCEDURE — G0108 DIAB MANAGE TRN  PER INDIV: CPT

## 2022-10-27 VITALS — BODY MASS INDEX: 31.18 KG/M2 | HEIGHT: 63 IN | WEIGHT: 176 LBS

## 2022-11-03 ENCOUNTER — APPOINTMENT (OUTPATIENT)
Dept: ENDOCRINOLOGY | Facility: HOSPITAL | Age: 62
End: 2022-11-03

## 2022-11-09 RX ORDER — LANCETS 28 GAUGE
EACH MISCELLANEOUS
Qty: 2 | Refills: 2 | Status: ACTIVE | COMMUNITY
Start: 2022-11-09 | End: 1900-01-01

## 2022-11-09 RX ORDER — BLOOD-GLUCOSE METER
KIT MISCELLANEOUS
Qty: 1 | Refills: 0 | Status: ACTIVE | COMMUNITY
Start: 2022-11-09 | End: 1900-01-01

## 2022-11-16 ENCOUNTER — RESULT REVIEW (OUTPATIENT)
Age: 62
End: 2022-11-16

## 2022-11-16 ENCOUNTER — NON-APPOINTMENT (OUTPATIENT)
Age: 62
End: 2022-11-16

## 2022-11-16 ENCOUNTER — APPOINTMENT (OUTPATIENT)
Dept: MAMMOGRAPHY | Facility: IMAGING CENTER | Age: 62
End: 2022-11-16

## 2022-11-16 ENCOUNTER — OUTPATIENT (OUTPATIENT)
Dept: OUTPATIENT SERVICES | Facility: HOSPITAL | Age: 62
LOS: 1 days | End: 2022-11-16
Payer: MEDICAID

## 2022-11-16 DIAGNOSIS — Z98.89 OTHER SPECIFIED POSTPROCEDURAL STATES: Chronic | ICD-10-CM

## 2022-11-16 DIAGNOSIS — Z00.8 ENCOUNTER FOR OTHER GENERAL EXAMINATION: ICD-10-CM

## 2022-11-16 PROCEDURE — 77067 SCR MAMMO BI INCL CAD: CPT | Mod: 26

## 2022-11-16 PROCEDURE — 77063 BREAST TOMOSYNTHESIS BI: CPT | Mod: 26

## 2022-11-16 PROCEDURE — 77067 SCR MAMMO BI INCL CAD: CPT

## 2022-11-16 PROCEDURE — 77063 BREAST TOMOSYNTHESIS BI: CPT

## 2022-11-20 NOTE — HISTORY OF PRESENT ILLNESS
[FreeTextEntry1] : 62 year old female with HTN controlled on meds, HLD stable on statins s/p episode of UA and cath with stent of RCA.  Pt feeling well since stent without CP, SOB or H/A

## 2022-11-20 NOTE — DISCUSSION/SUMMARY
[FreeTextEntry1] : CAD- stable s/p RCA stent\par \par HTN- controlled\par \par HLD- stable\par \par Followup in 6 mths\par \par Time spent reviewing history, exam, pt discussion, medicine notes and note writing [EKG obtained to assist in diagnosis and management of assessed problem(s)] : EKG obtained to assist in diagnosis and management of assessed problem(s)

## 2022-11-21 RX ORDER — FLASH GLUCOSE SENSOR
KIT MISCELLANEOUS
Qty: 6 | Refills: 3 | Status: ACTIVE | COMMUNITY
Start: 2022-11-21 | End: 1900-01-01

## 2022-11-21 RX ORDER — FLASH GLUCOSE SCANNING READER
EACH MISCELLANEOUS
Qty: 1 | Refills: 0 | Status: ACTIVE | COMMUNITY
Start: 2022-11-21 | End: 1900-01-01

## 2022-11-28 LAB — SARS-COV-2 N GENE NPH QL NAA+PROBE: NOT DETECTED

## 2022-11-30 ENCOUNTER — APPOINTMENT (OUTPATIENT)
Dept: PULMONOLOGY | Facility: CLINIC | Age: 62
End: 2022-11-30

## 2022-11-30 VITALS
WEIGHT: 172 LBS | DIASTOLIC BLOOD PRESSURE: 70 MMHG | HEART RATE: 78 BPM | HEIGHT: 63 IN | BODY MASS INDEX: 30.48 KG/M2 | SYSTOLIC BLOOD PRESSURE: 106 MMHG | TEMPERATURE: 97.7 F

## 2022-11-30 PROCEDURE — 94729 DIFFUSING CAPACITY: CPT

## 2022-11-30 PROCEDURE — 94060 EVALUATION OF WHEEZING: CPT

## 2022-11-30 PROCEDURE — 99213 OFFICE O/P EST LOW 20 MIN: CPT | Mod: 25

## 2022-11-30 PROCEDURE — 94726 PLETHYSMOGRAPHY LUNG VOLUMES: CPT

## 2022-11-30 PROCEDURE — ZZZZZ: CPT

## 2022-11-30 NOTE — HISTORY OF PRESENT ILLNESS
[TextBox_4] : pt here for follow up\par h/o sensitivities to certain enviromental factors - perfumes, cleaning products...\par when she smells them, feels sick, headaches, cough. \par improves with albuterol. generally tries to avoid the triggers\par only needs albuterol 1-2 x per month\par no asthma on PFT today or previous PFT\par \par T2DM, CAD with recent stents, HTN

## 2022-11-30 NOTE — ASSESSMENT
[FreeTextEntry1] : Repeat PFT 11/30/2022 mild restriction preserved DLCO. Similar to previous. \par \par Sensitivity to environmental triggers such as perfumes, sanitizers, cleaning, flowers\par Continue avoidance when able\par Does well with albuterol prn. Has used inhaler only 1-2 per month.\par Refilled albuterol\par

## 2022-11-30 NOTE — REVIEW OF SYSTEMS
[Cough] : cough [Fever] : no fever [Nasal Congestion] : no nasal congestion [Edema] : no edema [Nasal Discharge] : no nasal discharge

## 2022-12-07 ENCOUNTER — OUTPATIENT (OUTPATIENT)
Dept: OUTPATIENT SERVICES | Facility: HOSPITAL | Age: 62
LOS: 1 days | End: 2022-12-07
Payer: MEDICAID

## 2022-12-07 ENCOUNTER — APPOINTMENT (OUTPATIENT)
Dept: PODIATRY | Facility: HOSPITAL | Age: 62
End: 2022-12-07

## 2022-12-07 VITALS
WEIGHT: 178 LBS | SYSTOLIC BLOOD PRESSURE: 129 MMHG | HEIGHT: 63 IN | DIASTOLIC BLOOD PRESSURE: 76 MMHG | TEMPERATURE: 97 F | HEART RATE: 84 BPM | RESPIRATION RATE: 16 BRPM | BODY MASS INDEX: 31.54 KG/M2

## 2022-12-07 DIAGNOSIS — M79.609 PAIN IN UNSPECIFIED LIMB: ICD-10-CM

## 2022-12-07 DIAGNOSIS — M77.41 METATARSALGIA, RIGHT FOOT: ICD-10-CM

## 2022-12-07 DIAGNOSIS — M21.41 FLAT FOOT [PES PLANUS] (ACQUIRED), RIGHT FOOT: ICD-10-CM

## 2022-12-07 DIAGNOSIS — M76.829 POSTERIOR TIBIAL TENDINITIS, UNSPECIFIED LEG: ICD-10-CM

## 2022-12-07 DIAGNOSIS — M77.42 METATARSALGIA, RIGHT FOOT: ICD-10-CM

## 2022-12-07 DIAGNOSIS — Z98.89 OTHER SPECIFIED POSTPROCEDURAL STATES: Chronic | ICD-10-CM

## 2022-12-07 DIAGNOSIS — M24.573 CONTRACTURE, UNSPECIFIED ANKLE: ICD-10-CM

## 2022-12-07 PROCEDURE — G0463: CPT

## 2022-12-07 PROCEDURE — 99212 OFFICE O/P EST SF 10 MIN: CPT

## 2022-12-09 NOTE — PHYSICAL EXAM
[General Appearance - Alert] : alert [General Appearance - In No Acute Distress] : in no acute distress [2+] : left foot dorsalis pedis 2+ [No Joint Swelling] : no joint swelling [Skin Color & Pigmentation] : normal skin color and pigmentation [Skin Turgor] : normal skin turgor [Skin Lesions] : no skin lesions [Ankle Swelling (On Exam)] : not present [Varicose Veins Of Lower Extremities] : not present [] : not present [Delayed in the Right Toes] : capillary refills normal in right toes [Delayed in the Left Toes] : capillary refills normal in the left toes [Fede's Test For Radial Artery Patency Bilaterally] : negative bilateral [de-identified] : Hip ROM: 40 degrees external rotation, 30 degrees internal rotation b/l. No limb length discrepancy, patella midline b/l, no genu recurvatum/valgum/varum, no tibial varum, valgus. Ankle joint rectus b/l. Ankle joint ROM: -5 degrees with knee extended and knee flexed b/l. STJ ROM: 20 degrees inversion 10 degrees eversion R, 30 degrees inversion, 10 degrees eversion L. MTJ ROM: 15 degrees inversion/eversion R. 20 degrees inversion, 15 degrees eversion L. 1st ray ROM: 1.5 cm dorsiflexion, 5mm plantarflexion b/l. hallux dorsiflexion 25 degrees b/l, 50 degrees plantarflexion R, 30 degrees plantarflexion L. Early heel off b/l during gait, negative double and single heel raise test. [Foot Ulcer] : no foot ulcer [Skin Induration] : no skin induration [FreeTextEntry1] : Sub metatarsal 4 hyperkeratosis b/l

## 2022-12-09 NOTE — HISTORY OF PRESENT ILLNESS
[Stiff Soled Shoes] : stiff soled shoes [FreeTextEntry1] : 62 y/o F with PMHx of DM follow up appointment for diabetic foot evaluation. Since last visit in July, pt reports relief of bilateral forefoot pain. Was unable to obtain CFOS d/t insurance issue. Pt is no longer persuing CFOs as her symptoms have resolved d/t new job that requires less walking. Pt denies numbness or tingling in bilateral feet. Denies pain with ambulation. \par \par 9/5/22 A1c: 8.1%

## 2022-12-09 NOTE — ASSESSMENT
[FreeTextEntry1] : 62 y/o F w/ bilateral foot pain, diabetic foot evaluation\par - Bilateral gastroc-soleus ankle equinus, plantar forefoot fat pad atrophy\par - No open lesions, no clinical signs of infection, sensation intact\par - Continue ambulation in shoe gear with adequate heel lift and robust, accommodating outer sole\par - Pt to rtc 1 year

## 2022-12-15 ENCOUNTER — APPOINTMENT (OUTPATIENT)
Dept: ENDOCRINOLOGY | Facility: CLINIC | Age: 62
End: 2022-12-15

## 2022-12-15 VITALS
OXYGEN SATURATION: 99 % | HEIGHT: 63 IN | SYSTOLIC BLOOD PRESSURE: 138 MMHG | BODY MASS INDEX: 31.71 KG/M2 | HEART RATE: 78 BPM | WEIGHT: 179 LBS | DIASTOLIC BLOOD PRESSURE: 70 MMHG

## 2022-12-15 DIAGNOSIS — Z83.3 FAMILY HISTORY OF DIABETES MELLITUS: ICD-10-CM

## 2022-12-15 LAB — HBA1C MFR BLD HPLC: 9

## 2022-12-15 PROCEDURE — 99205 OFFICE O/P NEW HI 60 MIN: CPT | Mod: 25

## 2022-12-15 PROCEDURE — 83036 HEMOGLOBIN GLYCOSYLATED A1C: CPT | Mod: QW

## 2022-12-15 NOTE — ASSESSMENT
[Carbohydrate Consistent Diet] : carbohydrate consistent diet [Action and use of Insulin] : action and use of short and long-acting insulin [Self Monitoring of Blood Glucose] : self monitoring of blood glucose [FreeTextEntry1] : Patient is a 62 F with history of T2D, HTN, HLD CAD s/p RCA stent, here to establish care for T2DM management. \par \par # T2D  \par - HgB A1C: 9.0 12/15/2022 \par - Complications: retinopathy, CAD\par - Aspirin: yes\par - Most recent urine microalbumin   in 2020, repeat today   . ACE-I/ARB: yes\par - Most recent LDL:  57  . On statin: yes\par - Opthalmology up to date: yes,  advised for yearly check up\par - Podiatry up to date: no advised for yearly check up\par - Patient to call for persistent glucose < 70 or > 300\par - Patient counseled on the importance of consistent carbohydrate diet and regular physical activity \par - Advised patient to continue checking FSG and to bring meter to all visits \par - Symptoms of hypoglycemia discussed\par - Medications changes: \par - We discussed the importance of monitoring glucose. We agreed to check at least twice per day, fasting goal  and before her dinner time goal <200. Patient will send me her glucose log in 2 weeks and we will adjust her insulin regimen based on that. She has no  idea why she is on BID basaglar dosing. Will keep insulin regimen the same for now as we have no data. Basaglar 22 units BID and Admelog 16 units BID. \par - Continue with Metformin 1000 mg BID\par - Start Ozempic 0.25 mg weekly (she has the pens at home). For GLP1A, I discussed potential benefits for patients with clinical ASCVD as well as reduced the risk of cardiovascular mortality.  I discussed side effects of the GLP-1 agonist including pancreatitis, nausea or vomiting, injection site reactions and benefits including potential for weight loss and glycemic benefits. Patient confirmed that they have no history of pancreatitis and no family history of  thyroid cancer. \par    \par # HTN\par - /70\par - Lisinopril 10 mg daily and Metoprolol 25 ER mg daily  \par \par # HLD\par - Continue with atorvastatin 40 mg daily \par \par # Class 1 obesity \par - BMI 31.71\par - Start Ozempic as above. Increase to 0.5 mg weekly if tolerating well. \par \par FOLLOW UP: I recommend that next visit for diabetes care be in 3 month, sooner with CDE. \par \par To do at next clinic visit\par -Titrate insulin based on glucose \par - Titrate ozempic \par \par Tana Vargas MD\par Endocrinology, Diabetes and Metabolism\par [address]\[place] [address]\par Tel [phone]\par Fax [phone]

## 2022-12-15 NOTE — ASSESSMENT
[Carbohydrate Consistent Diet] : carbohydrate consistent diet [Action and use of Insulin] : action and use of short and long-acting insulin [Self Monitoring of Blood Glucose] : self monitoring of blood glucose [FreeTextEntry1] : Patient is a 62 F with history of T2D, HTN, HLD CAD s/p RCA stent, here to establish care for T2DM management. \par \par # T2D  \par - HgB A1C: 9.0 12/15/2022 \par - Complications: retinopathy, CAD\par - Aspirin: yes\par - Most recent urine microalbumin   in 2020, repeat today   . ACE-I/ARB: yes\par - Most recent LDL:  57  . On statin: yes\par - Opthalmology up to date: yes,  advised for yearly check up\par - Podiatry up to date: no advised for yearly check up\par - Patient to call for persistent glucose < 70 or > 300\par - Patient counseled on the importance of consistent carbohydrate diet and regular physical activity \par - Advised patient to continue checking FSG and to bring meter to all visits \par - Symptoms of hypoglycemia discussed\par - Medications changes: \par - We discussed the importance of monitoring glucose. We agreed to check at least twice per day, fasting goal  and before her dinner time goal <200. Patient will send me her glucose log in 2 weeks and we will adjust her insulin regimen based on that. She has no  idea why she is on BID basaglar dosing. Will keep insulin regimen the same for now as we have no data. Basaglar 22 units BID and Admelog 16 units BID. \par - Continue with Metformin 1000 mg BID\par - Start Ozempic 0.25 mg weekly (she has the pens at home). For GLP1A, I discussed potential benefits for patients with clinical ASCVD as well as reduced the risk of cardiovascular mortality.  I discussed side effects of the GLP-1 agonist including pancreatitis, nausea or vomiting, injection site reactions and benefits including potential for weight loss and glycemic benefits. Patient confirmed that they have no history of pancreatitis and no family history of  thyroid cancer. \par    \par # HTN\par - /70\par - Lisinopril 10 mg daily and Metoprolol 25 ER mg daily  \par \par # HLD\par - Continue with atorvastatin 40 mg daily \par \par # Class 1 obesity \par - BMI 31.71\par - Start Ozempic as above. Increase to 0.5 mg weekly if tolerating well. \par \par FOLLOW UP: I recommend that next visit for diabetes care be in 3 month, sooner with CDE. \par \par To do at next clinic visit\par -Titrate insulin based on glucose \par - Titrate ozempic \par \par Tana Vargas MD\par Endocrinology, Diabetes and Metabolism\par [address]\[place] [address]\par Tel [phone]\par Fax [phone]

## 2022-12-15 NOTE — HISTORY OF PRESENT ILLNESS
[FreeTextEntry1] : Patient is a 62 F with history of T2D, HTN, HLD CAD s/p RCA stent, here to establish care for T2DM management. \par \par FH: father and sister, brother have diabetes. Son and daughter have diabetes \par SH:denies smoking or alcohol use. . Working constantly on her feet \par \par #  T2DM:\par Diabetes history:\par Diagnosed  for about 39 years, when she was 23 years old . \par \par Most recent A1C 9.0 12/15/2022\par \par Diabetes complications:\par Neuropathy: numbness in her fingers \par Retinopathy: retinopathy and follows up with retina specialist (last eye exam  a few weeks ago)\par Nephropathy: (most recent Cr 0.79, GFR 85)\par CAD/MI/CVA: CAD with stent placement \par DKA: no\par \par Current DM medications: \par - Basaglar 22 units BID\par - Admelog 16 units BID \par - Metformin 1000 mg BID  \par \par Past DM medications: \par - none \par \par Finger sticks: \par Fastin, 188, 250, 229, 230\par \par Diet: \par Lunch  11am: snacks all day at work doesn't inject insulin at that time \par Dinner 4 pm: salad with cranberries and nuts and small piece of chicken or fish Or chicken with noodles and beets or Roti with tomato and onions \par Snacks:  fruits\par Drinks:coffee\par Exercise: none other than being active at job\par \par # HTN\par - /70\par - Lisinopril 10 mg daily and Metoprolol 25 ER mg daily  \par \par # HLD\par - Continue with atorvastatin 40 mg daily \par \par 12/15 visit events: she is having a lot of stress in her life right now, she is taking care of her ex but she feels like she is not being appreciated. Previously sent for CGM, but not approved because patent is not checking her finger stick. She is barely checking once per day. \par \par

## 2022-12-16 LAB
CREAT SPEC-SCNC: 128 MG/DL
MICROALBUMIN 24H UR DL<=1MG/L-MCNC: 1.2 MG/DL
MICROALBUMIN/CREAT 24H UR-RTO: NORMAL MG/G

## 2023-01-04 ENCOUNTER — APPOINTMENT (OUTPATIENT)
Dept: PODIATRY | Facility: HOSPITAL | Age: 63
End: 2023-01-04

## 2023-02-01 ENCOUNTER — APPOINTMENT (OUTPATIENT)
Dept: INTERNAL MEDICINE | Facility: CLINIC | Age: 63
End: 2023-02-01

## 2023-02-01 ENCOUNTER — APPOINTMENT (OUTPATIENT)
Dept: ENDOCRINOLOGY | Facility: CLINIC | Age: 63
End: 2023-02-01
Payer: MEDICAID

## 2023-02-01 VITALS — BODY MASS INDEX: 30.3 KG/M2 | WEIGHT: 171 LBS | HEIGHT: 63 IN

## 2023-02-01 PROCEDURE — G0108 DIAB MANAGE TRN  PER INDIV: CPT

## 2023-03-23 ENCOUNTER — RX RENEWAL (OUTPATIENT)
Age: 63
End: 2023-03-23

## 2023-03-27 ENCOUNTER — RX RENEWAL (OUTPATIENT)
Age: 63
End: 2023-03-27

## 2023-03-30 ENCOUNTER — APPOINTMENT (OUTPATIENT)
Dept: ENDOCRINOLOGY | Facility: CLINIC | Age: 63
End: 2023-03-30
Payer: MEDICAID

## 2023-03-30 VITALS
SYSTOLIC BLOOD PRESSURE: 110 MMHG | BODY MASS INDEX: 29.23 KG/M2 | WEIGHT: 165 LBS | OXYGEN SATURATION: 99 % | DIASTOLIC BLOOD PRESSURE: 70 MMHG | HEART RATE: 75 BPM | HEIGHT: 63 IN | RESPIRATION RATE: 14 BRPM

## 2023-03-30 DIAGNOSIS — I25.10 ATHEROSCLEROTIC HEART DISEASE OF NATIVE CORONARY ARTERY W/OUT ANGINA PECTORIS: ICD-10-CM

## 2023-03-30 LAB — HBA1C MFR BLD HPLC: 7.4

## 2023-03-30 PROCEDURE — 99214 OFFICE O/P EST MOD 30 MIN: CPT | Mod: 25

## 2023-03-30 PROCEDURE — 83036 HEMOGLOBIN GLYCOSYLATED A1C: CPT | Mod: QW

## 2023-03-30 NOTE — HISTORY OF PRESENT ILLNESS
[FreeTextEntry1] : Patient is a 62 F with history of T2D, HTN, HLD CAD s/p RCA stent, here for follow up for T2DM management. Last visit2022. \par \par FH: father and sister, brother have diabetes. Son and daughter have diabetes \par SH:denies smoking or alcohol use. . Working constantly on her feet \par Works \par \par #  T2DM:\par Diabetes history:\par Diagnosed  for about 39 years, when she was 23 years old . \par \par Most recent A1C 9.0 12/15/2022--> 7.4 2023\par \par Diabetes complications:\par Neuropathy: numbness in her fingers \par Retinopathy: retinopathy and follows up with retina specialist (last eye exam  a few weeks ago)\par Nephropathy: (most recent Cr 0.79, GFR 85)\par CAD/MI/CVA: CAD with stent placement \par DKA: no\par \par Current DM medications: \par - Basaglar 16 units BID\par - Admelog 12 units BID \par - Metformin 1000 mg BID  \par - Ozempic 0.5 mg weekly \par \par Past DM medications: \par - none \par \par Finger sticks: \par Fastin, 88, 95, 96\par \par Diet: \par Lunch  11am: snacks all day at work doesn't inject insulin at that time \par Dinner 4 pm: salad with cranberries and nuts and small piece of chicken or fish Or chicken with noodles and beets or Roti with tomato and onions \par Snacks:  fruits\par Drinks:coffee\par Exercise: none other than being active at job\par \par # HTN\par - /70\par - Lisinopril 10 mg daily and Metoprolol 25 ER mg daily  \par \par # HLD\par - LDL 56 2022\par - Continue with atorvastatin 40 mg daily \par \par 12/15 visit events: she is having a lot of stress in her life right now, she is taking care of her ex but she feels like she is not being appreciated. Previously sent for CGM, but not approved because patent is not checking her finger stick. She is barely checking once per day. \par \par 3/30/2023 visit events: feeling great. appetite has reduced. Lost some weight. \par

## 2023-03-30 NOTE — ASSESSMENT
[FreeTextEntry1] : Patient is a 62 F with history of T2D, HTN, HLD CAD s/p RCA stent, here for follow up for T2DM management. \par \par # T2D  \par - HgB A1C: 9.0 12/15/2022 -->7.4 today\par - Complications: retinopathy, CAD\par - Aspirin: yes\par - Most recent urine microalbumin  ACR negative . ACE-I/ARB: yes\par - Most recent LDL:  57  . On statin: yes\par - Opthalmology up to date: yes,  advised for yearly check up\par - Podiatry up to date: no advised for yearly check up\par - Patient to call for persistent glucose < 70 or > 300\par - Patient counseled on the importance of consistent carbohydrate diet and regular physical activity \par - Advised patient to continue checking FSG and to bring meter to all visits \par - Symptoms of hypoglycemia discussed\par - Medications changes: \par - We discussed the importance of monitoring glucose\par - Continue with Metformin 1000 mg BID\par - Increase Ozempic to 0.5 mg weekly \par - Decrease Lantus to 12 units QHS and Admelog to 8-10 units TID with meals \par    \par # HTN\par - /70\par - Continue with Lisinopril 10 mg daily and Metoprolol 25 ER mg daily  \par \par # HLD\par - LDL 56 at goal \par - Continue with atorvastatin 40 mg daily \par \par # Class 1 obesity \par - BMI 31.71--> 29.23\par - Lost about 14 pounds since last visit\par - Titrate Ozempic as above. Increase to 1.0 mg weekly if tolerating well. \par \par FOLLOW UP: I recommend that next visit for diabetes care be in 6 month  \par \par To do at next clinic visit\par -Titrate insulin based on glucose \par - Titrate ozempic up \par - Repeat blood work at next visit \par \par Tana Vargas MD\par Endocrinology, Diabetes and Metabolism\par [address]\[place] [address]\par Tel [phone]\par Fax [phone]

## 2023-04-19 ENCOUNTER — OUTPATIENT (OUTPATIENT)
Dept: OUTPATIENT SERVICES | Facility: HOSPITAL | Age: 63
LOS: 1 days | End: 2023-04-19
Payer: MEDICAID

## 2023-04-19 ENCOUNTER — APPOINTMENT (OUTPATIENT)
Dept: INTERNAL MEDICINE | Facility: CLINIC | Age: 63
End: 2023-04-19
Payer: MEDICAID

## 2023-04-19 VITALS
HEART RATE: 83 BPM | BODY MASS INDEX: 28.53 KG/M2 | DIASTOLIC BLOOD PRESSURE: 70 MMHG | WEIGHT: 161 LBS | SYSTOLIC BLOOD PRESSURE: 114 MMHG | OXYGEN SATURATION: 99 % | HEIGHT: 63 IN

## 2023-04-19 DIAGNOSIS — K63.5 POLYP OF COLON: ICD-10-CM

## 2023-04-19 DIAGNOSIS — E11.65 TYPE 2 DIABETES MELLITUS WITH HYPERGLYCEMIA: ICD-10-CM

## 2023-04-19 DIAGNOSIS — E78.5 HYPERLIPIDEMIA, UNSPECIFIED: ICD-10-CM

## 2023-04-19 DIAGNOSIS — I10 ESSENTIAL (PRIMARY) HYPERTENSION: ICD-10-CM

## 2023-04-19 DIAGNOSIS — Z00.00 ENCOUNTER FOR GENERAL ADULT MEDICAL EXAMINATION W/OUT ABNORMAL FINDINGS: ICD-10-CM

## 2023-04-19 DIAGNOSIS — Z23 ENCOUNTER FOR IMMUNIZATION: ICD-10-CM

## 2023-04-19 DIAGNOSIS — Z98.89 OTHER SPECIFIED POSTPROCEDURAL STATES: Chronic | ICD-10-CM

## 2023-04-19 PROCEDURE — 99213 OFFICE O/P EST LOW 20 MIN: CPT | Mod: GE,25

## 2023-04-19 RX ORDER — PEN NEEDLE, DIABETIC 29 G X1/2"
32G X 4 MM NEEDLE, DISPOSABLE MISCELLANEOUS
Qty: 1 | Refills: 1 | Status: ACTIVE | COMMUNITY
Start: 2023-02-01 | End: 1900-01-01

## 2023-04-19 RX ORDER — ALBUTEROL SULFATE 90 UG/1
108 (90 BASE) AEROSOL, METERED RESPIRATORY (INHALATION)
Qty: 1 | Refills: 5 | Status: ACTIVE | COMMUNITY
Start: 2019-09-03 | End: 1900-01-01

## 2023-04-19 NOTE — HEALTH RISK ASSESSMENT
[No] : No [0] : 2) Feeling down, depressed, or hopeless: Not at all (0) [PHQ-2 Negative - No further assessment needed] : PHQ-2 Negative - No further assessment needed [Never] : Never [[id number]] : 0

## 2023-04-19 NOTE — HISTORY OF PRESENT ILLNESS
[FreeTextEntry1] : General checkup [de-identified] : 63 yo F hx type 2 DM , HTN, HLD, CAD s/p RCA stent, and asthma presenting for checkup. Follows with endo for DM2, had repeat A1C in March improved to 7.4 from 9.0.  Pt has been losing weight with the help of ozempic and thinks it is working well. Eating smaller portions. Sometimes feels a bit nauseous after taking the ozempic one day later, but then it goes away predictably.No vomiting and normal bowel movements. Otherwise feeling well.

## 2023-04-19 NOTE — PLAN
[FreeTextEntry1] : DM2\par -C/W insulin, metformin, Ozempic\par -F/U with endocrinology\par -Repeat A1C in 2-3 months at next visit\par -C/W lisinopril, atorvastatin\par -Podiatry referral\par -C/W regular ophthalmology visits. \par \par HCM\par -Gyn referral for cervical ca screening\par -GI referral for CC screening\par -PHQ-2 negative\par -First dose shingrex given. RTC in 2-6 months for second dose\par -CBC, CMP, lipid profile.

## 2023-04-19 NOTE — ASSESSMENT
[FreeTextEntry1] : 63 yo F hx type 2 DM , HTN, HLD, CAD s/p RCA stent, and asthma presenting for checkup. Overall in good health.

## 2023-04-19 NOTE — REVIEW OF SYSTEMS
[Negative] : Heme/Lymph [Nausea] : nausea [Abdominal Pain] : no abdominal pain [Constipation] : no constipation [Diarrhea] : diarrhea [Vomiting] : no vomiting [Heartburn] : no heartburn [Melena] : no melena

## 2023-04-20 PROCEDURE — 90471 IMMUNIZATION ADMIN: CPT

## 2023-04-20 PROCEDURE — 80061 LIPID PANEL: CPT

## 2023-04-20 PROCEDURE — 85025 COMPLETE CBC W/AUTO DIFF WBC: CPT

## 2023-04-20 PROCEDURE — G0463: CPT | Mod: 25

## 2023-04-20 PROCEDURE — 80053 COMPREHEN METABOLIC PANEL: CPT

## 2023-04-20 PROCEDURE — 90750 HZV VACC RECOMBINANT IM: CPT

## 2023-04-21 LAB
ALBUMIN SERPL ELPH-MCNC: 4.5 G/DL
ALP BLD-CCNC: 66 U/L
ALT SERPL-CCNC: 13 U/L
ANION GAP SERPL CALC-SCNC: 15 MMOL/L
AST SERPL-CCNC: 16 U/L
BILIRUB SERPL-MCNC: 0.5 MG/DL
BUN SERPL-MCNC: 12 MG/DL
CALCIUM SERPL-MCNC: 10 MG/DL
CHLORIDE SERPL-SCNC: 101 MMOL/L
CHOLEST SERPL-MCNC: 145 MG/DL
CO2 SERPL-SCNC: 23 MMOL/L
CREAT SERPL-MCNC: 0.74 MG/DL
EGFR: 91 ML/MIN/1.73M2
GLUCOSE SERPL-MCNC: 114 MG/DL
HDLC SERPL-MCNC: 62 MG/DL
LDLC SERPL CALC-MCNC: 56 MG/DL
NONHDLC SERPL-MCNC: 83 MG/DL
POTASSIUM SERPL-SCNC: 4.4 MMOL/L
PROT SERPL-MCNC: 7.2 G/DL
SODIUM SERPL-SCNC: 139 MMOL/L
TRIGL SERPL-MCNC: 134 MG/DL

## 2023-04-25 ENCOUNTER — APPOINTMENT (OUTPATIENT)
Dept: OPHTHALMOLOGY | Facility: CLINIC | Age: 63
End: 2023-04-25
Payer: MEDICAID

## 2023-04-25 ENCOUNTER — NON-APPOINTMENT (OUTPATIENT)
Age: 63
End: 2023-04-25

## 2023-04-25 PROCEDURE — 92014 COMPRE OPH EXAM EST PT 1/>: CPT

## 2023-04-25 PROCEDURE — 92134 CPTRZ OPH DX IMG PST SGM RTA: CPT

## 2023-05-03 ENCOUNTER — NON-APPOINTMENT (OUTPATIENT)
Age: 63
End: 2023-05-03

## 2023-05-03 ENCOUNTER — APPOINTMENT (OUTPATIENT)
Dept: CARDIOLOGY | Facility: CLINIC | Age: 63
End: 2023-05-03
Payer: MEDICAID

## 2023-05-03 VITALS
SYSTOLIC BLOOD PRESSURE: 113 MMHG | WEIGHT: 161 LBS | DIASTOLIC BLOOD PRESSURE: 69 MMHG | HEART RATE: 74 BPM | OXYGEN SATURATION: 98 % | HEIGHT: 63 IN | BODY MASS INDEX: 28.53 KG/M2

## 2023-05-03 PROCEDURE — 93000 ELECTROCARDIOGRAM COMPLETE: CPT

## 2023-05-03 PROCEDURE — 99214 OFFICE O/P EST MOD 30 MIN: CPT | Mod: 25

## 2023-05-03 RX ORDER — BLOOD-GLUCOSE METER
EACH MISCELLANEOUS
Qty: 180 | Refills: 2 | Status: DISCONTINUED | COMMUNITY
Start: 2018-11-19 | End: 2023-05-03

## 2023-05-17 ENCOUNTER — APPOINTMENT (OUTPATIENT)
Dept: OBGYN | Facility: CLINIC | Age: 63
End: 2023-05-17
Payer: MEDICAID

## 2023-05-17 ENCOUNTER — OUTPATIENT (OUTPATIENT)
Dept: OUTPATIENT SERVICES | Facility: HOSPITAL | Age: 63
LOS: 1 days | End: 2023-05-17
Payer: MEDICAID

## 2023-05-17 ENCOUNTER — LABORATORY RESULT (OUTPATIENT)
Age: 63
End: 2023-05-17

## 2023-05-17 VITALS — SYSTOLIC BLOOD PRESSURE: 112 MMHG | WEIGHT: 157 LBS | DIASTOLIC BLOOD PRESSURE: 74 MMHG | BODY MASS INDEX: 27.81 KG/M2

## 2023-05-17 DIAGNOSIS — N76.0 ACUTE VAGINITIS: ICD-10-CM

## 2023-05-17 DIAGNOSIS — Z01.419 ENCOUNTER FOR GYNECOLOGICAL EXAMINATION (GENERAL) (ROUTINE) W/OUT ABNORMAL FINDINGS: ICD-10-CM

## 2023-05-17 PROCEDURE — 87624 HPV HI-RISK TYP POOLED RSLT: CPT

## 2023-05-17 PROCEDURE — 99396 PREV VISIT EST AGE 40-64: CPT | Mod: GE

## 2023-05-17 PROCEDURE — G0463: CPT

## 2023-05-18 LAB — HPV HIGH+LOW RISK DNA PNL CVX: SIGNIFICANT CHANGE UP

## 2023-05-18 NOTE — HISTORY OF PRESENT ILLNESS
[FreeTextEntry1] : Patient is a 63yo postmenopausal P3 with PMH CAD s/p stent, HLD, HTN, T2DM, asthma presenting for routine annual appointment.\par \par The patient has no complaints or concerns today. She denies any postmenopausal bleeding, abnormal discharge, incontinence, dysuria, chest pain, shortness of breath, abdominal pain, pelvic pain, weight gain or weight loss, early satiety. She is not currently sexually active and has no plans to be.\par \par OBHx: C/S x3\par Gyn: no hx fibroids, abnormal paps, STIs. Does have a hx calcified ovarian cyst\par PMH: T2DM, HTN, HLD, CAD s/p stent, cataracts, asthma\par PSH: C/S x3, stent placement\par Meds: Ozempic, Lisinopril, Metoprolol, ASA, Metformin, Basaglar, Admelog\par All: PCN, chlorhexidine

## 2023-05-18 NOTE — PHYSICAL EXAM
[Appropriately responsive] : appropriately responsive [Alert] : alert [No Acute Distress] : no acute distress [No Lymphadenopathy] : no lymphadenopathy [Soft] : soft [Non-tender] : non-tender [Non-distended] : non-distended [No Mass] : no mass [Oriented x3] : oriented x3 [Examination Of The Breasts] : a normal appearance [No Masses] : no breast masses were palpable [Vulvar Atrophy] : vulvar atrophy [Labia Majora] : normal [Labia Minora] : normal [Atrophy] : atrophy [Dry Mucosa] : dry mucosa [Normal] : normal [Uterine Adnexae] : non-palpable [FreeTextEntry6] : No masses, lesions, dimpling or discharged noted

## 2023-05-18 NOTE — PLAN
[FreeTextEntry1] : Patient is a 61yo postmenopausal P3 with PMH CAD s/p stent, HLD, HTN, T2DM, asthma presenting for routine annual appointment with no specific complaints.\par \par #Well-woman care\par - Repeat pap with HPV done today, patient declined STI testing\par - Mammo referral will be given through PCP\par - Patient scheduled for colonoscopy in June\par - RTC 1 year for repeat annual appointment\par \par L Panella PGY1\par d/w Dr. Ruff

## 2023-05-18 NOTE — REVIEW OF SYSTEMS
[Fever] : no fever [Poor Appetite] : appetite not poor [Recent Weight Gain (___ Lbs)] : no recent weight gain [Recent Weight Loss (___ Lbs)] : no recent weight loss [Dyspnea] : no dyspnea [Chest Pain] : no chest pain [Abdominal Pain] : no abdominal pain [Constipation] : no constipation [Bloating] : no bloating [Nausea] : no nausea [Urgency] : no urgency [Frequency] : no frequency [Incontinence] : no incontinence [Abn Vaginal bleeding] : no abnormal vaginal bleeding [Breast Pain] : no breast pain

## 2023-05-18 NOTE — PROCEDURE
[Cervical Pap Smear] : cervical Pap smear [Liquid Base] : liquid base [Tolerated Well] : the patient tolerated the procedure well [No Complications] : there were no complications [de-identified] : some slight bleeding noted after cervical brush

## 2023-05-20 LAB — CYTOLOGY SPEC DOC CYTO: SIGNIFICANT CHANGE UP

## 2023-05-21 NOTE — PHYSICAL EXAM
[General Appearance - Well Developed] : well developed [Normal Appearance] : normal appearance [Well Groomed] : well groomed [General Appearance - Well Nourished] : well nourished [No Deformities] : no deformities [General Appearance - In No Acute Distress] : no acute distress [Normal Conjunctiva] : the conjunctiva exhibited no abnormalities [Eyelids - No Xanthelasma] : the eyelids demonstrated no xanthelasmas [Normal Oral Mucosa] : normal oral mucosa [No Oral Pallor] : no oral pallor [No Oral Cyanosis] : no oral cyanosis [Normal Jugular Venous A Waves Present] : normal jugular venous A waves present [No Jugular Venous Early A Waves] : no jugular venous early A waves [Normal Jugular Venous V Waves Present] : normal jugular venous V waves present [Respiration, Rhythm And Depth] : normal respiratory rhythm and effort [Exaggerated Use Of Accessory Muscles For Inspiration] : no accessory muscle use [Heart Rate And Rhythm] : heart rate and rhythm were normal [Auscultation Breath Sounds / Voice Sounds] : lungs were clear to auscultation bilaterally [Heart Sounds] : normal S1 and S2 [Murmurs] : no murmurs present [Abdomen Soft] : soft [Abdomen Tenderness] : non-tender [Abnormal Walk] : normal gait [Abdomen Mass (___ Cm)] : no abdominal mass palpated [Gait - Sufficient For Exercise Testing] : the gait was sufficient for exercise testing [Nail Clubbing] : no clubbing of the fingernails [Petechial Hemorrhages (___cm)] : no petechial hemorrhages [Cyanosis, Localized] : no localized cyanosis [] : no rash [Skin Color & Pigmentation] : normal skin color and pigmentation [No Venous Stasis] : no venous stasis [Skin Lesions] : no skin lesions [No Xanthoma] : no  xanthoma was observed [No Skin Ulcers] : no skin ulcer [Oriented To Time, Place, And Person] : oriented to person, place, and time [Affect] : the affect was normal [No Anxiety] : not feeling anxious [Mood] : the mood was normal

## 2023-05-26 DIAGNOSIS — Z01.419 ENCOUNTER FOR GYNECOLOGICAL EXAMINATION (GENERAL) (ROUTINE) WITHOUT ABNORMAL FINDINGS: ICD-10-CM

## 2023-05-30 ENCOUNTER — RX RENEWAL (OUTPATIENT)
Age: 63
End: 2023-05-30

## 2023-06-08 ENCOUNTER — APPOINTMENT (OUTPATIENT)
Dept: GASTROENTEROLOGY | Facility: CLINIC | Age: 63
End: 2023-06-08

## 2023-06-14 ENCOUNTER — APPOINTMENT (OUTPATIENT)
Dept: INTERNAL MEDICINE | Facility: CLINIC | Age: 63
End: 2023-06-14

## 2023-06-19 ENCOUNTER — APPOINTMENT (OUTPATIENT)
Dept: INTERNAL MEDICINE | Facility: CLINIC | Age: 63
End: 2023-06-19
Payer: MEDICAID

## 2023-06-19 ENCOUNTER — OUTPATIENT (OUTPATIENT)
Dept: OUTPATIENT SERVICES | Facility: HOSPITAL | Age: 63
LOS: 1 days | End: 2023-06-19
Payer: MEDICAID

## 2023-06-19 VITALS
WEIGHT: 150 LBS | HEIGHT: 63 IN | OXYGEN SATURATION: 98 % | DIASTOLIC BLOOD PRESSURE: 70 MMHG | SYSTOLIC BLOOD PRESSURE: 132 MMHG | HEART RATE: 75 BPM | BODY MASS INDEX: 26.58 KG/M2

## 2023-06-19 DIAGNOSIS — I10 ESSENTIAL (PRIMARY) HYPERTENSION: ICD-10-CM

## 2023-06-19 LAB — HBA1C MFR BLD HPLC: 8.8

## 2023-06-19 PROCEDURE — 90471 IMMUNIZATION ADMIN: CPT

## 2023-06-19 PROCEDURE — G0463: CPT | Mod: 25

## 2023-06-19 PROCEDURE — 36415 COLL VENOUS BLD VENIPUNCTURE: CPT

## 2023-06-19 PROCEDURE — 83036 HEMOGLOBIN GLYCOSYLATED A1C: CPT

## 2023-06-19 PROCEDURE — 90750 HZV VACC RECOMBINANT IM: CPT

## 2023-06-19 PROCEDURE — 99213 OFFICE O/P EST LOW 20 MIN: CPT | Mod: GE

## 2023-06-21 DIAGNOSIS — E11.65 TYPE 2 DIABETES MELLITUS WITH HYPERGLYCEMIA: ICD-10-CM

## 2023-06-21 DIAGNOSIS — Z23 ENCOUNTER FOR IMMUNIZATION: ICD-10-CM

## 2023-06-21 LAB
ESTIMATED AVERAGE GLUCOSE: 212 MG/DL
HBA1C MFR BLD HPLC: 9 %

## 2023-06-21 NOTE — ASSESSMENT
[FreeTextEntry1] : 63 yo F hx type 2 DM, HTN, HLD, CAD s/p RCA stent, and asthma presents for follow up re T2DM and shingrix\par \par #T2DM\par - reports continued weight loss on current regimen: ozempic, metformin, and insulin basal/bolus\par - A1C 7.4 in 3/2023 from 9.0 previously\par - POC A1C in office today 8.8; however machine has reportedly been overestimating due to poor calibration, will order confirmatory lab test; can inc ozempic dose if needed thereafter\par \par #HCM\par - shingrix 2nd dose today\par - gyn and GI referrals for cancer screening reinforced\par - labs reviewed from 4/2023

## 2023-06-21 NOTE — HISTORY OF PRESENT ILLNESS
[FreeTextEntry1] : Follow up for T2DM and Shingrix [de-identified] : 63 yo F hx type 2 DM, HTN, HLD, CAD s/p RCA stent, and asthma presenting for follow up regarding T2DM and for second dose of shingrix vaccine. The patient had a recent reduction in her A1C from 9 to 7.4 in 3/2023. The patient is on ozempic, metformin, and insulin and has been tolerating the medications well. She endorses gradual weight loss since starting ozempic and hast lost 7 lbs over the past month since the last visit. The patient endorses some abdominal pain and bloating when she overeats and so she has cut down on her portions gradually. The patient otherwise feels well and is in her usual state of health.

## 2023-07-26 ENCOUNTER — OUTPATIENT (OUTPATIENT)
Dept: OUTPATIENT SERVICES | Facility: HOSPITAL | Age: 63
LOS: 1 days | End: 2023-07-26
Payer: MEDICAID

## 2023-07-26 ENCOUNTER — APPOINTMENT (OUTPATIENT)
Dept: PODIATRY | Facility: HOSPITAL | Age: 63
End: 2023-07-26
Payer: MEDICAID

## 2023-07-26 VITALS
DIASTOLIC BLOOD PRESSURE: 77 MMHG | BODY MASS INDEX: 26.22 KG/M2 | WEIGHT: 148 LBS | HEART RATE: 77 BPM | HEIGHT: 63 IN | SYSTOLIC BLOOD PRESSURE: 119 MMHG | TEMPERATURE: 96.8 F

## 2023-07-26 DIAGNOSIS — M77.41 METATARSALGIA, RIGHT FOOT: ICD-10-CM

## 2023-07-26 DIAGNOSIS — M24.573 CONTRACTURE, UNSPECIFIED ANKLE: ICD-10-CM

## 2023-07-26 DIAGNOSIS — Q66.89 OTHER SPECIFIED CONGENITAL DEFORMITIES OF FEET: ICD-10-CM

## 2023-07-26 DIAGNOSIS — M77.42 METATARSALGIA, RIGHT FOOT: ICD-10-CM

## 2023-07-26 DIAGNOSIS — M21.41 FLAT FOOT [PES PLANUS] (ACQUIRED), RIGHT FOOT: ICD-10-CM

## 2023-07-26 DIAGNOSIS — Z98.89 OTHER SPECIFIED POSTPROCEDURAL STATES: Chronic | ICD-10-CM

## 2023-07-26 DIAGNOSIS — M79.609 PAIN IN UNSPECIFIED LIMB: ICD-10-CM

## 2023-07-26 DIAGNOSIS — M21.42 FLAT FOOT [PES PLANUS] (ACQUIRED), RIGHT FOOT: ICD-10-CM

## 2023-07-26 PROCEDURE — G0463: CPT

## 2023-07-26 PROCEDURE — XXXXX: CPT | Mod: 1L

## 2023-07-26 NOTE — ASSESSMENT
[FreeTextEntry1] : 62 y/o F w/ bilateral foot pain, diabetic foot evaluation\par - Bilateral gastroc-soleus ankle equinus, plantar forefoot fat pad atrophy\par - No open lesions, no clinical signs of infection, sensation intact\par - Fabricated removable metatarsal offloading pads\par - Continue ambulation in shoe gear with adequate heel lift and robust, accommodating outer sole\par - Pt to rtc 1 year

## 2023-07-26 NOTE — HISTORY OF PRESENT ILLNESS
[Sneakers] : denia [FreeTextEntry1] : 62 y/o F with PMHx of DM follow up appointment for diabetic foot evaluation. Pt last seen in clinic in December. Relates that she no longer feels pain in either foot. States that she wears metatarsal offloading pads at all times with relief felt. Denies numbness or tingling in either foot, no new pain, trauma or lesions. \par

## 2023-08-07 ENCOUNTER — RX RENEWAL (OUTPATIENT)
Age: 63
End: 2023-08-07

## 2023-08-30 ENCOUNTER — OUTPATIENT (OUTPATIENT)
Dept: OUTPATIENT SERVICES | Facility: HOSPITAL | Age: 63
LOS: 1 days | End: 2023-08-30
Payer: MEDICAID

## 2023-08-30 ENCOUNTER — APPOINTMENT (OUTPATIENT)
Dept: INTERNAL MEDICINE | Facility: CLINIC | Age: 63
End: 2023-08-30
Payer: MEDICAID

## 2023-08-30 VITALS
HEART RATE: 75 BPM | HEIGHT: 63 IN | DIASTOLIC BLOOD PRESSURE: 78 MMHG | WEIGHT: 140 LBS | BODY MASS INDEX: 24.8 KG/M2 | OXYGEN SATURATION: 98 % | SYSTOLIC BLOOD PRESSURE: 122 MMHG

## 2023-08-30 DIAGNOSIS — I10 ESSENTIAL (PRIMARY) HYPERTENSION: ICD-10-CM

## 2023-08-30 DIAGNOSIS — R63.4 ABNORMAL WEIGHT LOSS: ICD-10-CM

## 2023-08-30 DIAGNOSIS — Z98.89 OTHER SPECIFIED POSTPROCEDURAL STATES: Chronic | ICD-10-CM

## 2023-08-30 LAB — HBA1C MFR BLD HPLC: 7.3

## 2023-08-30 PROCEDURE — 99213 OFFICE O/P EST LOW 20 MIN: CPT | Mod: 25

## 2023-08-30 PROCEDURE — G0463: CPT | Mod: 25

## 2023-08-30 PROCEDURE — 83036 HEMOGLOBIN GLYCOSYLATED A1C: CPT

## 2023-08-31 PROBLEM — R63.4 WEIGHT LOSS: Status: ACTIVE | Noted: 2023-08-31

## 2023-08-31 NOTE — HISTORY OF PRESENT ILLNESS
[FreeTextEntry1] : Follow up [de-identified] : 61 yo F hx type 2 DM, HTN, HLD, CAD s/p RCA stent, and asthma presenting for follow up regarding T2DM   Patient seen 6/2023 and was found to have a rise in her A1C from 7.4 to 9 despite subjectively feeling like her diabetes was well controlled. Ozempic inc to 2 mg/week. We discussed inc basal/bolus but patient had stopped checking her FS at home.  Today she reports continued weight loss, down to 140 lb from 170+ earlier in the year. She attributes it to the ozempic but has some concerns regarding a lack of appetite and early satiety. She reports she feels well with the weight loss, feesl more active, and her mood has been better as a result.  She currently takes 2 mg ozempic weekly, metformin 1000 mg BID, and 10-12U lantus, 10U premeal. Her FS readings at home have been at inconsistent times and range from . Reports that she knows not to take her insulin when her FS is low. AM insulin ranges from  with 10U insulin.

## 2023-08-31 NOTE — END OF VISIT
[] : Resident [FreeTextEntry3] : f/u re weight loss. pursue up to date screening and labs [Time Spent: ___ minutes] : I have spent [unfilled] minutes of time on the encounter.

## 2023-08-31 NOTE — ASSESSMENT
[FreeTextEntry1] : 63 yo F hx type 2 DM, HTN, HLD, CAD s/p RCA stent, and asthma presents for follow up re T2DM  #T2DM - A1C inc from 7.4 in 3/2023 to 9.0 in 6/2023 --> ozempic inc to 2 mg / week - currently on 10/10 basal/bolus, metformin 1g BID, ozempic 2/wk - continue with current regimen: FS log inconsistent with lability, patient knows how to titrate insulin to goal - A1C back down to 7.3 today, continue  - has endo apt 9/28/2023  #weight loss - patient with continued weight loss in the setting of ozempic use and dietary changes - subjectively feels well because of the weight loss: feels more active, less fatigued, inc mood - degree of weight loss is concerning (approx 40 lbs), however without any alarm signs - has GI apt for colonoscopy in 1 week - has had GYN follow up 5/2023 - will check TSH during CPE in 10/2023, otherwise   #HCM - gyn and colon cancer screening given previously, importance reinforced, has GI apt 9/7 - labs from 4/2023 reviewed, WNL - RTC for CPE: scheduled in 10/2023

## 2023-09-07 ENCOUNTER — APPOINTMENT (OUTPATIENT)
Dept: GASTROENTEROLOGY | Facility: CLINIC | Age: 63
End: 2023-09-07
Payer: MEDICAID

## 2023-09-07 VITALS
HEIGHT: 63 IN | SYSTOLIC BLOOD PRESSURE: 107 MMHG | WEIGHT: 140 LBS | OXYGEN SATURATION: 99 % | HEART RATE: 80 BPM | DIASTOLIC BLOOD PRESSURE: 72 MMHG | BODY MASS INDEX: 24.8 KG/M2

## 2023-09-07 DIAGNOSIS — D12.6 BENIGN NEOPLASM OF COLON, UNSPECIFIED: ICD-10-CM

## 2023-09-07 PROCEDURE — 99204 OFFICE O/P NEW MOD 45 MIN: CPT

## 2023-09-07 NOTE — REVIEW OF SYSTEMS
[Feeling Poorly] : not feeling poorly [Feeling Tired] : not feeling tired [Red Eyes] : eyes not red [Scleral Icterus (Yellow Eyes)] : no scleral icterus [Sore Throat] : no sore throat [Hoarseness] : no hoarseness [Chest Pain] : no chest pain [Palpitations] : no palpitations [Lower Ext Edema (lower leg swelling)] : no lower extremity edema [Shortness Of Breath] : no shortness of breath [As Noted in HPI] : as noted in HPI [Arthralgias (joint pain)] : no arthralgias [Joint Stiffness] : no joint stiffness [Itching] : no itching [Jaundice (yellowing of skin)] : no jaundice [Dizziness] : no dizziness [Fainting] : no fainting [Anxiety] : no anxiety [Depression] : no depression [Easy Bleeding] : no tendency for easy bleeding [Easy Bruising] : no tendency for easy bruising

## 2023-09-07 NOTE — ASSESSMENT
[FreeTextEntry1] : Impression: # Colon Polyp: Next colonoscopy due 5 years after previous (2025)  Plan: Due for repeat colonoscopy in 2025 RTC PRN.

## 2023-09-07 NOTE — HISTORY OF PRESENT ILLNESS
[FreeTextEntry1] : 62 year old woman with hx of DM2, HTN, HLD, CAD s/p RCA stent, and asthma presenting for follow up for colon cancer screening.  Patient denies any complaints at this time and feels well overall. Patient denies fevers, chills, chest pain, SOB, nausea, vomiting, diarrhea, melena, hematochezia, hematemesis, dysphagia, odynophagia, headache, dizziness, abdominal pain and recent travel. Reports recent weight loss in the setting of Ozempic use. Last colonoscopy in 2020 with removal of 3 small polyps (2 tubular adenomas and 1 hyperplastic prep) - Good prep.

## 2023-09-07 NOTE — PHYSICAL EXAM
[Alert] : alert [Normal Voice/Communication] : normal voice/communication [Healthy Appearing] : healthy appearing [Sclera] : the sclera and conjunctiva were normal [Hearing Threshold Finger Rub Not [place]] : hearing was normal [Normal Lips/Gums] : the lips and gums were normal [Normal Appearance] : the appearance of the neck was normal [No Neck Mass] : no neck mass was observed [No Respiratory Distress] : no respiratory distress [No Acc Muscle Use] : no accessory muscle use [Respiration, Rhythm And Depth] : normal respiratory rhythm and effort [Heart Rate And Rhythm] : heart rate was normal and rhythm regular [Normal S1, S2] : normal S1 and S2 [Murmurs] : no murmurs [Bowel Sounds] : normal bowel sounds [Abdomen Tenderness] : non-tender [No Masses] : no abdominal mass palpated [Abdomen Soft] : soft [Cervical Lymph Nodes Enlarged Posterior Bilaterally] : no posterior cervical lymphadenopathy [Cervical Lymph Nodes Enlarged Anterior Bilaterally] : no anterior cervical lymphadenopathy [No CVA Tenderness] : no CVA  tenderness [No Spinal Tenderness] : no spinal tenderness [Abnormal Walk] : normal gait [Involuntary Movements] : no involuntary movements were seen [Normal Color / Pigmentation] : normal skin color and pigmentation [Skin Lesions] : no skin lesions [No Focal Deficits] : no focal deficits [Motor Exam] : the motor exam was normal [Oriented To Time, Place, And Person] : oriented to person, place, and time [Normal Affect] : the affect was normal [Normal Mood] : the mood was normal

## 2023-09-08 DIAGNOSIS — Z79.4 LONG TERM (CURRENT) USE OF INSULIN: ICD-10-CM

## 2023-09-08 DIAGNOSIS — E11.65 TYPE 2 DIABETES MELLITUS WITH HYPERGLYCEMIA: ICD-10-CM

## 2023-09-08 DIAGNOSIS — R63.4 ABNORMAL WEIGHT LOSS: ICD-10-CM

## 2023-09-18 ENCOUNTER — RX RENEWAL (OUTPATIENT)
Age: 63
End: 2023-09-18

## 2023-09-28 ENCOUNTER — APPOINTMENT (OUTPATIENT)
Dept: ENDOCRINOLOGY | Facility: CLINIC | Age: 63
End: 2023-09-28
Payer: MEDICAID

## 2023-09-28 ENCOUNTER — APPOINTMENT (OUTPATIENT)
Dept: ENDOCRINOLOGY | Facility: CLINIC | Age: 63
End: 2023-09-28

## 2023-09-28 VITALS
HEART RATE: 85 BPM | OXYGEN SATURATION: 99 % | SYSTOLIC BLOOD PRESSURE: 110 MMHG | HEIGHT: 63 IN | DIASTOLIC BLOOD PRESSURE: 70 MMHG | BODY MASS INDEX: 25.52 KG/M2 | WEIGHT: 144 LBS

## 2023-09-28 DIAGNOSIS — R68.89 OTHER GENERAL SYMPTOMS AND SIGNS: ICD-10-CM

## 2023-09-28 LAB
GLUCOSE BLDC GLUCOMTR-MCNC: 243
HBA1C MFR BLD HPLC: 7.4

## 2023-09-28 PROCEDURE — 99214 OFFICE O/P EST MOD 30 MIN: CPT

## 2023-09-29 ENCOUNTER — APPOINTMENT (OUTPATIENT)
Dept: ENDOCRINOLOGY | Facility: CLINIC | Age: 63
End: 2023-09-29

## 2023-09-29 ENCOUNTER — APPOINTMENT (OUTPATIENT)
Dept: ENDOCRINOLOGY | Facility: CLINIC | Age: 63
End: 2023-09-29
Payer: MEDICAID

## 2023-09-29 PROCEDURE — 95251 CONT GLUC MNTR ANALYSIS I&R: CPT

## 2023-09-29 PROCEDURE — 95249 CONT GLUC MNTR PT PROV EQP: CPT

## 2023-09-29 PROCEDURE — G0108 DIAB MANAGE TRN  PER INDIV: CPT

## 2023-10-01 RX ORDER — BLOOD-GLUCOSE SENSOR
EACH MISCELLANEOUS
Qty: 2 | Refills: 6 | Status: ACTIVE | COMMUNITY
Start: 2023-09-29 | End: 1900-01-01

## 2023-10-04 ENCOUNTER — MED ADMIN CHARGE (OUTPATIENT)
Age: 63
End: 2023-10-04

## 2023-10-04 ENCOUNTER — APPOINTMENT (OUTPATIENT)
Dept: INTERNAL MEDICINE | Facility: CLINIC | Age: 63
End: 2023-10-04
Payer: MEDICAID

## 2023-10-04 ENCOUNTER — OUTPATIENT (OUTPATIENT)
Dept: OUTPATIENT SERVICES | Facility: HOSPITAL | Age: 63
LOS: 1 days | End: 2023-10-04
Payer: MEDICAID

## 2023-10-04 VITALS
SYSTOLIC BLOOD PRESSURE: 126 MMHG | WEIGHT: 141 LBS | BODY MASS INDEX: 24.98 KG/M2 | HEIGHT: 63 IN | DIASTOLIC BLOOD PRESSURE: 78 MMHG | OXYGEN SATURATION: 97 % | HEART RATE: 85 BPM

## 2023-10-04 DIAGNOSIS — I10 ESSENTIAL (PRIMARY) HYPERTENSION: ICD-10-CM

## 2023-10-04 DIAGNOSIS — Z23 ENCOUNTER FOR IMMUNIZATION: ICD-10-CM

## 2023-10-04 DIAGNOSIS — Z98.89 OTHER SPECIFIED POSTPROCEDURAL STATES: Chronic | ICD-10-CM

## 2023-10-04 DIAGNOSIS — R68.89 OTHER GENERAL SYMPTOMS AND SIGNS: ICD-10-CM

## 2023-10-04 PROCEDURE — 99396 PREV VISIT EST AGE 40-64: CPT | Mod: 25

## 2023-10-04 PROCEDURE — G0008: CPT

## 2023-10-04 PROCEDURE — G0463: CPT | Mod: 25

## 2023-10-05 LAB
25(OH)D3 SERPL-MCNC: 34.3 NG/ML
ALBUMIN SERPL ELPH-MCNC: 4.7 G/DL
ALP BLD-CCNC: 65 U/L
ALT SERPL-CCNC: 13 U/L
ANION GAP SERPL CALC-SCNC: 12 MMOL/L
AST SERPL-CCNC: 17 U/L
BILIRUB SERPL-MCNC: 0.4 MG/DL
BUN SERPL-MCNC: 10 MG/DL
CALCIUM SERPL-MCNC: 10.2 MG/DL
CHLORIDE SERPL-SCNC: 97 MMOL/L
CHOLEST SERPL-MCNC: 124 MG/DL
CO2 SERPL-SCNC: 28 MMOL/L
CREAT SERPL-MCNC: 0.63 MG/DL
CREAT SPEC-SCNC: 200 MG/DL
EGFR: 100 ML/MIN/1.73M2
GLUCOSE SERPL-MCNC: 206 MG/DL
HDLC SERPL-MCNC: 68 MG/DL
LDLC SERPL CALC-MCNC: 40 MG/DL
MICROALBUMIN 24H UR DL<=1MG/L-MCNC: 2.6 MG/DL
MICROALBUMIN/CREAT 24H UR-RTO: 13 MG/G
NONHDLC SERPL-MCNC: 55 MG/DL
POTASSIUM SERPL-SCNC: 4.8 MMOL/L
PROT SERPL-MCNC: 7.2 G/DL
SODIUM SERPL-SCNC: 137 MMOL/L
T4 FREE SERPL-MCNC: 1.6 NG/DL
TRIGL SERPL-MCNC: 76 MG/DL
TSH SERPL-ACNC: 0.48 UIU/ML

## 2023-10-09 DIAGNOSIS — Z79.4 LONG TERM (CURRENT) USE OF INSULIN: ICD-10-CM

## 2023-10-09 DIAGNOSIS — E11.65 TYPE 2 DIABETES MELLITUS WITH HYPERGLYCEMIA: ICD-10-CM

## 2023-10-09 DIAGNOSIS — Z00.00 ENCOUNTER FOR GENERAL ADULT MEDICAL EXAMINATION WITHOUT ABNORMAL FINDINGS: ICD-10-CM

## 2023-10-09 DIAGNOSIS — R68.89 OTHER GENERAL SYMPTOMS AND SIGNS: ICD-10-CM

## 2023-10-09 DIAGNOSIS — Z23 ENCOUNTER FOR IMMUNIZATION: ICD-10-CM

## 2023-10-12 ENCOUNTER — NON-APPOINTMENT (OUTPATIENT)
Age: 63
End: 2023-10-12

## 2023-10-12 ENCOUNTER — APPOINTMENT (OUTPATIENT)
Dept: OPHTHALMOLOGY | Facility: CLINIC | Age: 63
End: 2023-10-12
Payer: MEDICAID

## 2023-10-12 PROCEDURE — 92014 COMPRE OPH EXAM EST PT 1/>: CPT

## 2023-10-12 PROCEDURE — 92134 CPTRZ OPH DX IMG PST SGM RTA: CPT

## 2023-10-13 ENCOUNTER — NON-APPOINTMENT (OUTPATIENT)
Age: 63
End: 2023-10-13

## 2023-11-01 ENCOUNTER — RX RENEWAL (OUTPATIENT)
Age: 63
End: 2023-11-01

## 2023-11-08 ENCOUNTER — APPOINTMENT (OUTPATIENT)
Dept: CARDIOLOGY | Facility: CLINIC | Age: 63
End: 2023-11-08
Payer: COMMERCIAL

## 2023-11-08 ENCOUNTER — APPOINTMENT (OUTPATIENT)
Dept: INTERNAL MEDICINE | Facility: CLINIC | Age: 63
End: 2023-11-08

## 2023-11-08 VITALS
SYSTOLIC BLOOD PRESSURE: 130 MMHG | HEIGHT: 63 IN | HEART RATE: 73 BPM | DIASTOLIC BLOOD PRESSURE: 80 MMHG | WEIGHT: 139 LBS | OXYGEN SATURATION: 97 % | BODY MASS INDEX: 24.63 KG/M2

## 2023-11-08 DIAGNOSIS — I25.10 ATHEROSCLEROTIC HEART DISEASE OF NATIVE CORONARY ARTERY W/OUT ANGINA PECTORIS: ICD-10-CM

## 2023-11-08 PROCEDURE — 99214 OFFICE O/P EST MOD 30 MIN: CPT | Mod: 25

## 2023-11-08 PROCEDURE — 93000 ELECTROCARDIOGRAM COMPLETE: CPT

## 2023-11-17 ENCOUNTER — RESULT REVIEW (OUTPATIENT)
Age: 63
End: 2023-11-17

## 2023-11-17 ENCOUNTER — APPOINTMENT (OUTPATIENT)
Dept: MAMMOGRAPHY | Facility: CLINIC | Age: 63
End: 2023-11-17
Payer: COMMERCIAL

## 2023-11-17 PROCEDURE — 77063 BREAST TOMOSYNTHESIS BI: CPT

## 2023-11-17 PROCEDURE — 77067 SCR MAMMO BI INCL CAD: CPT

## 2023-12-25 PROBLEM — I25.10 ATHEROSCLEROSIS OF CORONARY ARTERY: Status: ACTIVE | Noted: 2017-03-16

## 2023-12-25 NOTE — PHYSICAL EXAM
[General Appearance - Well Developed] : well developed [Normal Appearance] : normal appearance [Well Groomed] : well groomed [No Deformities] : no deformities [General Appearance - Well Nourished] : well nourished [General Appearance - In No Acute Distress] : no acute distress [Normal Conjunctiva] : the conjunctiva exhibited no abnormalities [Eyelids - No Xanthelasma] : the eyelids demonstrated no xanthelasmas [Normal Oral Mucosa] : normal oral mucosa [No Oral Pallor] : no oral pallor [No Oral Cyanosis] : no oral cyanosis [Normal Jugular Venous A Waves Present] : normal jugular venous A waves present [Normal Jugular Venous V Waves Present] : normal jugular venous V waves present [No Jugular Venous Early A Waves] : no jugular venous early A waves [Respiration, Rhythm And Depth] : normal respiratory rhythm and effort [Exaggerated Use Of Accessory Muscles For Inspiration] : no accessory muscle use [Auscultation Breath Sounds / Voice Sounds] : lungs were clear to auscultation bilaterally [Heart Rate And Rhythm] : heart rate and rhythm were normal [Heart Sounds] : normal S1 and S2 [Murmurs] : no murmurs present [Abdomen Soft] : soft [Abdomen Tenderness] : non-tender [Abdomen Mass (___ Cm)] : no abdominal mass palpated [Abnormal Walk] : normal gait [Gait - Sufficient For Exercise Testing] : the gait was sufficient for exercise testing [Cyanosis, Localized] : no localized cyanosis [Nail Clubbing] : no clubbing of the fingernails [Petechial Hemorrhages (___cm)] : no petechial hemorrhages [Skin Color & Pigmentation] : normal skin color and pigmentation [] : no rash [No Venous Stasis] : no venous stasis [Skin Lesions] : no skin lesions [No Skin Ulcers] : no skin ulcer [No Xanthoma] : no  xanthoma was observed [Oriented To Time, Place, And Person] : oriented to person, place, and time [Affect] : the affect was normal [Mood] : the mood was normal [No Anxiety] : not feeling anxious

## 2024-01-05 ENCOUNTER — RESULT REVIEW (OUTPATIENT)
Age: 64
End: 2024-01-05

## 2024-01-05 ENCOUNTER — APPOINTMENT (OUTPATIENT)
Dept: ULTRASOUND IMAGING | Facility: CLINIC | Age: 64
End: 2024-01-05
Payer: COMMERCIAL

## 2024-01-05 ENCOUNTER — APPOINTMENT (OUTPATIENT)
Dept: MAMMOGRAPHY | Facility: CLINIC | Age: 64
End: 2024-01-05
Payer: COMMERCIAL

## 2024-01-05 PROCEDURE — 76641 ULTRASOUND BREAST COMPLETE: CPT | Mod: LT

## 2024-01-05 PROCEDURE — 77061 BREAST TOMOSYNTHESIS UNI: CPT

## 2024-01-05 PROCEDURE — 77065 DX MAMMO INCL CAD UNI: CPT | Mod: LT

## 2024-02-01 ENCOUNTER — APPOINTMENT (OUTPATIENT)
Dept: ENDOCRINOLOGY | Facility: CLINIC | Age: 64
End: 2024-02-01

## 2024-02-05 ENCOUNTER — RX RENEWAL (OUTPATIENT)
Age: 64
End: 2024-02-05

## 2024-02-06 ENCOUNTER — RX RENEWAL (OUTPATIENT)
Age: 64
End: 2024-02-06

## 2024-02-07 ENCOUNTER — RX RENEWAL (OUTPATIENT)
Age: 64
End: 2024-02-07

## 2024-02-14 RX ORDER — INSULIN LISPRO 100 [IU]/ML
100 INJECTION, SOLUTION INTRAVENOUS; SUBCUTANEOUS
Qty: 3 | Refills: 3 | Status: COMPLETED | COMMUNITY
Start: 2022-06-08 | End: 2024-02-14

## 2024-02-22 ENCOUNTER — OUTPATIENT (OUTPATIENT)
Dept: OUTPATIENT SERVICES | Facility: HOSPITAL | Age: 64
LOS: 1 days | End: 2024-02-22
Payer: COMMERCIAL

## 2024-02-22 ENCOUNTER — APPOINTMENT (OUTPATIENT)
Dept: INTERNAL MEDICINE | Facility: CLINIC | Age: 64
End: 2024-02-22
Payer: COMMERCIAL

## 2024-02-22 VITALS
WEIGHT: 147 LBS | BODY MASS INDEX: 26.05 KG/M2 | OXYGEN SATURATION: 99 % | DIASTOLIC BLOOD PRESSURE: 60 MMHG | HEIGHT: 63 IN | HEART RATE: 77 BPM | SYSTOLIC BLOOD PRESSURE: 130 MMHG

## 2024-02-22 DIAGNOSIS — I10 ESSENTIAL (PRIMARY) HYPERTENSION: ICD-10-CM

## 2024-02-22 DIAGNOSIS — Z98.89 OTHER SPECIFIED POSTPROCEDURAL STATES: Chronic | ICD-10-CM

## 2024-02-22 LAB — HBA1C MFR BLD HPLC: 10

## 2024-02-22 PROCEDURE — 99214 OFFICE O/P EST MOD 30 MIN: CPT | Mod: GC

## 2024-02-22 PROCEDURE — G0463: CPT

## 2024-02-22 PROCEDURE — 83036 HEMOGLOBIN GLYCOSYLATED A1C: CPT

## 2024-02-22 RX ORDER — LISINOPRIL 5 MG/1
5 TABLET ORAL DAILY
Qty: 90 | Refills: 3 | Status: ACTIVE | COMMUNITY
Start: 2018-08-29 | End: 1900-01-01

## 2024-02-22 RX ORDER — TIRZEPATIDE 7.5 MG/.5ML
7.5 INJECTION, SOLUTION SUBCUTANEOUS
Qty: 3 | Refills: 2 | Status: DISCONTINUED | COMMUNITY
Start: 2023-10-23 | End: 2024-02-22

## 2024-02-22 RX ORDER — METOPROLOL SUCCINATE 25 MG/1
25 TABLET, EXTENDED RELEASE ORAL DAILY
Qty: 90 | Refills: 3 | Status: ACTIVE | COMMUNITY
Start: 2018-11-19 | End: 1900-01-01

## 2024-02-22 RX ORDER — SEMAGLUTIDE 2.68 MG/ML
8 INJECTION, SOLUTION SUBCUTANEOUS
Qty: 3 | Refills: 3 | Status: DISCONTINUED | COMMUNITY
Start: 2022-07-11 | End: 2024-02-22

## 2024-02-22 RX ORDER — INSULIN ASPART 100 [IU]/ML
100 INJECTION, SOLUTION INTRAVENOUS; SUBCUTANEOUS
Qty: 5 | Refills: 3 | Status: DISCONTINUED | COMMUNITY
Start: 2024-02-14 | End: 2024-02-22

## 2024-02-23 NOTE — ASSESSMENT
[FreeTextEntry1] : 63 year F with hx of DM2, HTN, HLD, CAD s/p RCA stent, and asthma presenting for f/u  #DM2 - A1c 7.4 -> 10 - stopped ozempic 2/2 expensive  - taking lispro 10u 2 times a day before meals  - taking basaglar 10u 2 times a day  - wide range of BG 60-400s while most high readings related to meals PLAN - lantus 5u at bedtime  - humalog 10u before meals  - start trulicity  - will task Dr. Altman for DM teaching  - extensive teaching regarding different type of insulin  - f/u endo   F/u 5 weeks for DM  Discussed with Dr. Cabezas

## 2024-02-23 NOTE — END OF VISIT
[] : Resident [FreeTextEntry3] : Had extensive discussion on the dangers of diabetes and the importance of taking insulin correctly. Patient's insurance changed, not taking ozempic. Based on log that showed high meal time sugar, adjusted the insulin regimen. Will try to get patient Trulicity, DM teaching and back with endocrinology. Will need close-up.

## 2024-02-23 NOTE — HISTORY OF PRESENT ILLNESS
[de-identified] : 63 year F with hx of DM2, HTN, HLD, CAD s/p RCA stent, and asthma presenting for f/u  Has been having issues with meds 2/2 insurnace  - stopped ozempic 2/2 expensive  - taking lispro 10u 2 times a day before meals  - taking basaglar 10u 2 times a day  - wide range of BG 60-400s

## 2024-02-28 RX ORDER — INSULIN LISPRO 100 [IU]/ML
100 INJECTION, SOLUTION INTRAVENOUS; SUBCUTANEOUS
Qty: 1 | Refills: 3 | Status: DISCONTINUED | COMMUNITY
Start: 2024-02-22 | End: 2024-02-28

## 2024-03-01 ENCOUNTER — RX RENEWAL (OUTPATIENT)
Age: 64
End: 2024-03-01

## 2024-03-01 RX ORDER — ASPIRIN 81 MG/1
81 TABLET, COATED ORAL
Qty: 90 | Refills: 1 | Status: ACTIVE | COMMUNITY
Start: 2022-07-05 | End: 1900-01-01

## 2024-03-04 DIAGNOSIS — Z79.4 LONG TERM (CURRENT) USE OF INSULIN: ICD-10-CM

## 2024-03-04 DIAGNOSIS — E11.65 TYPE 2 DIABETES MELLITUS WITH HYPERGLYCEMIA: ICD-10-CM

## 2024-03-27 ENCOUNTER — OUTPATIENT (OUTPATIENT)
Dept: OUTPATIENT SERVICES | Facility: HOSPITAL | Age: 64
LOS: 1 days | End: 2024-03-27
Payer: COMMERCIAL

## 2024-03-27 ENCOUNTER — APPOINTMENT (OUTPATIENT)
Dept: INTERNAL MEDICINE | Facility: CLINIC | Age: 64
End: 2024-03-27
Payer: COMMERCIAL

## 2024-03-27 VITALS
OXYGEN SATURATION: 98 % | BODY MASS INDEX: 27.46 KG/M2 | SYSTOLIC BLOOD PRESSURE: 124 MMHG | HEART RATE: 78 BPM | HEIGHT: 63 IN | WEIGHT: 155 LBS | DIASTOLIC BLOOD PRESSURE: 78 MMHG

## 2024-03-27 DIAGNOSIS — J45.909 UNSPECIFIED ASTHMA, UNCOMPLICATED: ICD-10-CM

## 2024-03-27 DIAGNOSIS — E78.5 HYPERLIPIDEMIA, UNSPECIFIED: ICD-10-CM

## 2024-03-27 DIAGNOSIS — I10 ESSENTIAL (PRIMARY) HYPERTENSION: ICD-10-CM

## 2024-03-27 DIAGNOSIS — Z98.89 OTHER SPECIFIED POSTPROCEDURAL STATES: Chronic | ICD-10-CM

## 2024-03-27 PROCEDURE — G0463: CPT

## 2024-03-27 PROCEDURE — 99213 OFFICE O/P EST LOW 20 MIN: CPT | Mod: GE

## 2024-03-28 ENCOUNTER — APPOINTMENT (OUTPATIENT)
Dept: ENDOCRINOLOGY | Facility: CLINIC | Age: 64
End: 2024-03-28
Payer: COMMERCIAL

## 2024-03-28 VITALS
SYSTOLIC BLOOD PRESSURE: 126 MMHG | HEIGHT: 63 IN | WEIGHT: 155 LBS | HEART RATE: 96 BPM | BODY MASS INDEX: 27.46 KG/M2 | DIASTOLIC BLOOD PRESSURE: 70 MMHG | OXYGEN SATURATION: 99 %

## 2024-03-28 DIAGNOSIS — E66.9 OBESITY, UNSPECIFIED: ICD-10-CM

## 2024-03-28 DIAGNOSIS — I10 ESSENTIAL (PRIMARY) HYPERTENSION: ICD-10-CM

## 2024-03-28 DIAGNOSIS — E55.9 VITAMIN D DEFICIENCY, UNSPECIFIED: ICD-10-CM

## 2024-03-28 PROCEDURE — 99214 OFFICE O/P EST MOD 30 MIN: CPT

## 2024-03-28 RX ORDER — DULAGLUTIDE 0.75 MG/.5ML
0.75 INJECTION, SOLUTION SUBCUTANEOUS
Qty: 4 | Refills: 1 | Status: ACTIVE | COMMUNITY
Start: 2024-02-22 | End: 1900-01-01

## 2024-03-28 RX ORDER — BLOOD-GLUCOSE SENSOR
EACH MISCELLANEOUS
Qty: 9 | Refills: 3 | Status: ACTIVE | COMMUNITY
Start: 2024-03-28 | End: 1900-01-01

## 2024-03-28 RX ORDER — EMPAGLIFLOZIN 10 MG/1
10 TABLET, FILM COATED ORAL
Qty: 90 | Refills: 2 | Status: ACTIVE | COMMUNITY
Start: 2024-03-28 | End: 1900-01-01

## 2024-03-28 NOTE — HISTORY OF PRESENT ILLNESS
[FreeTextEntry1] : Patient is a 63 F with history of T2D, HTN, HLD CAD s/p RCA stent, here for follow up for T2DM management. Last visit 2023 with me.   FH: father and sister, brother have diabetes. Son and daughter have diabetes  SH:denies smoking or alcohol use. . Working constantly on her feet  Works   #  T2DM: Diabetes history: Diagnosed  for about 39 years, when she was 23 years old .   Most recent A1C 9.0 12/15/2022--> 7.4 2023--> 7.4 2023--> 10 2024  Diabetes complications: Neuropathy: numbness in her fingers  Retinopathy: retinopathy and follows up with retina specialist (last eye exam a few weeks ago) Nephropathy: ACR negative (most recent Cr 0.79, GFR 85) CAD/MI/CVA: CAD with stent placement  DKA: no  Current DM medications:  - Basaglar 10 units BID - Admelog 12 units BID   - Metformin 1000 mg BID   - Trulicity approved, but not started yet   Past DM medications:  - none   Finger sticks:  Fastin, 198, 128, 242, 145 Postprandial 388 Not checking glucose as often due to finger hurts   Diet:  Lunch  11am: snacks all day at work doesn't inject insulin at that time  Dinner 4 pm: salad with cranberries and nuts and small piece of chicken or fish Or chicken with noodles and beets or Roti with tomato and onions  Snacks:  fruits Drinks: coffee Exercise: none other than being active at job  # HTN - /70 - Lisinopril 10 mg daily and Metoprolol 25 ER mg daily    # HLD - LDL 40  2023 - On atorvastatin 40 mg daily   12/15 visit events: she is having a lot of stress in her life right now, she is taking care of her ex but she feels like she is not being appreciated. Previously sent for CGM, but not approved because patent is not checking her finger stick. She is barely checking once per day.   3/30/2023 visit events: feeling great. appetite has reduced. Lost some weight.   2023 visit events: feeling cold all the time. Appetite is low, not eating much. Lost weight. Feels otherwise good.   3/28/2024 visit events: insurance changed, so she is not able to get CGM covered. Having trouble keeping glucose controlled. Find that since insurance change, lots of things are no longer covered

## 2024-03-28 NOTE — ASSESSMENT
[FreeTextEntry1] : Patient is a 62 F with history of T2D, HTN, HLD CAD s/p RCA stent, here for follow up for T2DM management.   # T2D   - HgB A1C: 9.0 12/15/2022 -->7.4 03/30/2023--> 7.4 09/2023--> 10 02/2024 - Complications: retinopathy, CAD - Aspirin: yes - Most recent urine microalbumin  ACR negative . ACE-I/ARB: yes - Most recent LDL:  40 09/2023 . On statin: yes - Opthalmology up to date: yes,  advised for yearly check up - Podiatry up to date: no advised for yearly check up - Patient to call for persistent glucose < 70 or > 300 - Patient counseled on the importance of consistent carbohydrate diet and regular physical activity  - Advised patient to continue checking FSG and to bring meter to all visits  - Symptoms of hypoglycemia discussed and advised the patient to call me to adjust insulin dosing if glucose <70 - Medications changes:  - We discussed the importance of monitoring glucose - Continue with Metformin 1000 mg BID - Trulicity now covered, start Trulicity 0.75 mg weekly  - Start Jardiance 10 mg daily  - Increase basaglar to 14 units QHS - Increase admelog to 14 units TID before meals  - Patient on 4 times per day daily insulin injection would benefit from use of CGM for glycemic control and hypoglycemic prevention       # HTN - /70 - Continue with Lisinopril 10 mg daily and Metoprolol 25 ER mg daily    # HLD - LDL 40 09/2023 - Continue with atorvastatin 40 mg daily   # Class 1 obesity  - BMI 31.71--> 29.23--> 25.51--> 27.5 - Lost about 30 pounds since last visit - start trulicity as above     # Vitamin D deficiency  - vitamin D 34.3 09/2023  FOLLOW UP: I recommend that next visit for diabetes care be in 3 month with NP and 6 month with me   To do at next clinic visit -Titrate insulin based on glucose  - Repeat blood work at next visit   Tana Vargas MD Endocrinology, Diabetes and Metabolism [address] [place] [address]. [address] [place] [address] Tel [phone] Fax [phone]

## 2024-03-29 NOTE — ASSESSMENT
[FreeTextEntry1] : 63 year F with hx of DM2, HTN, HLD, CAD s/p RCA stent, and asthma presenting for f/u  #DM2 - BG at home in wide ranges 50s-380s, reports BGs tend to be low in the morning after waking up - A1c 10 2/2024 2/2 insurance problems and poor understanding of insulin use - taking 16u basal insulin and 10-12u pre-meal insulin   PLAN - will have PA started for trulicity  - decrease basal insulin to 10u and increase pre-meal to 14u insulin  - f/u with endo, will see if August appointment can be pushed up  - too early to re-check A1c  Case d/w Dr. Schroeder   RTC in 10 weeks for DM f/u

## 2024-03-29 NOTE — HISTORY OF PRESENT ILLNESS
[de-identified] : 63 year F with hx of DM2, HTN, HLD, CAD s/p RCA stent, and asthma presenting for f/u

## 2024-04-03 ENCOUNTER — APPOINTMENT (OUTPATIENT)
Dept: ENDOCRINOLOGY | Facility: CLINIC | Age: 64
End: 2024-04-03
Payer: COMMERCIAL

## 2024-04-03 DIAGNOSIS — J45.909 UNSPECIFIED ASTHMA, UNCOMPLICATED: ICD-10-CM

## 2024-04-03 DIAGNOSIS — Z79.4 LONG TERM (CURRENT) USE OF INSULIN: ICD-10-CM

## 2024-04-03 DIAGNOSIS — E78.5 HYPERLIPIDEMIA, UNSPECIFIED: ICD-10-CM

## 2024-04-03 DIAGNOSIS — E11.65 TYPE 2 DIABETES MELLITUS WITH HYPERGLYCEMIA: ICD-10-CM

## 2024-04-03 PROCEDURE — G0108 DIAB MANAGE TRN  PER INDIV: CPT

## 2024-04-11 ENCOUNTER — APPOINTMENT (OUTPATIENT)
Dept: OPHTHALMOLOGY | Facility: CLINIC | Age: 64
End: 2024-04-11
Payer: COMMERCIAL

## 2024-04-11 ENCOUNTER — NON-APPOINTMENT (OUTPATIENT)
Age: 64
End: 2024-04-11

## 2024-04-11 PROCEDURE — 92134 CPTRZ OPH DX IMG PST SGM RTA: CPT

## 2024-04-11 PROCEDURE — 92014 COMPRE OPH EXAM EST PT 1/>: CPT

## 2024-04-17 ENCOUNTER — APPOINTMENT (OUTPATIENT)
Dept: ENDOCRINOLOGY | Facility: CLINIC | Age: 64
End: 2024-04-17
Payer: COMMERCIAL

## 2024-04-17 VITALS — WEIGHT: 155 LBS | BODY MASS INDEX: 27.46 KG/M2

## 2024-04-17 PROCEDURE — G0108 DIAB MANAGE TRN  PER INDIV: CPT

## 2024-05-08 ENCOUNTER — APPOINTMENT (OUTPATIENT)
Dept: CARDIOLOGY | Facility: CLINIC | Age: 64
End: 2024-05-08

## 2024-05-08 VITALS
HEART RATE: 72 BPM | SYSTOLIC BLOOD PRESSURE: 123 MMHG | HEIGHT: 63 IN | OXYGEN SATURATION: 99 % | WEIGHT: 155 LBS | DIASTOLIC BLOOD PRESSURE: 80 MMHG | BODY MASS INDEX: 27.46 KG/M2

## 2024-05-08 PROCEDURE — 93000 ELECTROCARDIOGRAM COMPLETE: CPT

## 2024-05-08 PROCEDURE — G2211 COMPLEX E/M VISIT ADD ON: CPT

## 2024-05-08 PROCEDURE — 99214 OFFICE O/P EST MOD 30 MIN: CPT

## 2024-05-08 RX ORDER — ASPIRIN 81 MG/1
81 TABLET ORAL
Qty: 90 | Refills: 1 | Status: DISCONTINUED | COMMUNITY
Start: 2018-08-02 | End: 2024-05-08

## 2024-05-08 RX ORDER — BLOOD SUGAR DIAGNOSTIC
STRIP MISCELLANEOUS
Qty: 300 | Refills: 2 | Status: DISCONTINUED | COMMUNITY
Start: 2022-11-09 | End: 2024-05-08

## 2024-05-17 NOTE — PRE-ANESTHESIA EVALUATION ADULT - [email or web address]
No

## 2024-06-05 ENCOUNTER — OUTPATIENT (OUTPATIENT)
Dept: OUTPATIENT SERVICES | Facility: HOSPITAL | Age: 64
LOS: 1 days | End: 2024-06-05
Payer: COMMERCIAL

## 2024-06-05 ENCOUNTER — APPOINTMENT (OUTPATIENT)
Dept: INTERNAL MEDICINE | Facility: CLINIC | Age: 64
End: 2024-06-05
Payer: COMMERCIAL

## 2024-06-05 VITALS
BODY MASS INDEX: 26.58 KG/M2 | DIASTOLIC BLOOD PRESSURE: 78 MMHG | SYSTOLIC BLOOD PRESSURE: 118 MMHG | OXYGEN SATURATION: 96 % | HEART RATE: 78 BPM | HEIGHT: 63 IN | WEIGHT: 150 LBS

## 2024-06-05 DIAGNOSIS — I10 ESSENTIAL (PRIMARY) HYPERTENSION: ICD-10-CM

## 2024-06-05 DIAGNOSIS — Z98.89 OTHER SPECIFIED POSTPROCEDURAL STATES: Chronic | ICD-10-CM

## 2024-06-05 DIAGNOSIS — Z79.4 TYPE 2 DIABETES MELLITUS WITH HYPERGLYCEMIA: ICD-10-CM

## 2024-06-05 DIAGNOSIS — E11.65 TYPE 2 DIABETES MELLITUS WITH HYPERGLYCEMIA: ICD-10-CM

## 2024-06-05 LAB — HBA1C MFR BLD HPLC: 8.2

## 2024-06-05 PROCEDURE — G0463: CPT

## 2024-06-05 PROCEDURE — 83036 HEMOGLOBIN GLYCOSYLATED A1C: CPT

## 2024-06-05 PROCEDURE — 99213 OFFICE O/P EST LOW 20 MIN: CPT | Mod: GE

## 2024-06-05 RX ORDER — INSULIN GLARGINE 100 [IU]/ML
100 INJECTION, SOLUTION SUBCUTANEOUS
Qty: 3 | Refills: 3 | Status: DISCONTINUED | COMMUNITY
Start: 2023-02-02 | End: 2024-06-05

## 2024-06-05 RX ORDER — INSULIN ASPART 100 [IU]/ML
100 INJECTION, SOLUTION INTRAVENOUS; SUBCUTANEOUS
Qty: 1 | Refills: 3 | Status: ACTIVE | COMMUNITY
Start: 2024-02-28 | End: 1900-01-01

## 2024-06-05 RX ORDER — INSULIN GLARGINE 100 [IU]/ML
100 INJECTION, SOLUTION SUBCUTANEOUS AT BEDTIME
Qty: 1 | Refills: 3 | Status: ACTIVE | COMMUNITY
Start: 2020-06-12 | End: 1900-01-01

## 2024-06-05 NOTE — ASSESSMENT
[FreeTextEntry1] : 63F Hx DM2, HTN, HLD, CAD s/p RCA stent, asthma presenting   #DM2 - A1c 10 --> 8.2 - on metformin 1000mg BID, Jardiance 10mg, Basaglar 12-14u QHS, [id number], trulicity 0.75mg - decrease novolog to 5u premeal and basaglar to 8u QHS - has endo f/u appointment scheduled monthly until October, counselled not to miss appointments   Case d/w Dr. Damian Parra  RTC 6mo for DM f/u

## 2024-06-05 NOTE — END OF VISIT
[] : Resident [FreeTextEntry3] : In addition to above, would add that she may be able to increase the Trulicity and/or Jardiance and d/c premeal altogether although favor reducing insulin for now. Normally would recommend closer follow-up but given multiple Endo appts feel it is fair to space her PCP appts

## 2024-06-05 NOTE — HISTORY OF PRESENT ILLNESS
[de-identified] : 63F Hx DM2, HTN, HLD, CAD s/p RCA stent, asthma   Reports BGs as low at 50-60s around 2-3AM   Breakfast- coffee and egg no lunch Dinner after 5pm - small dishes including protein and starch but doesn't eat whole plate   Taking [id number] premeal and basaglar 12u along with jardiance, metofrmin, and trulicity

## 2024-06-10 DIAGNOSIS — E11.65 TYPE 2 DIABETES MELLITUS WITH HYPERGLYCEMIA: ICD-10-CM

## 2024-06-10 DIAGNOSIS — Z79.4 LONG TERM (CURRENT) USE OF INSULIN: ICD-10-CM

## 2024-06-19 ENCOUNTER — APPOINTMENT (OUTPATIENT)
Dept: ENDOCRINOLOGY | Facility: CLINIC | Age: 64
End: 2024-06-19

## 2024-06-20 ENCOUNTER — RX RENEWAL (OUTPATIENT)
Age: 64
End: 2024-06-20

## 2024-06-27 ENCOUNTER — RX RENEWAL (OUTPATIENT)
Age: 64
End: 2024-06-27

## 2024-08-15 ENCOUNTER — APPOINTMENT (OUTPATIENT)
Dept: ENDOCRINOLOGY | Facility: CLINIC | Age: 64
End: 2024-08-15

## 2024-09-26 ENCOUNTER — APPOINTMENT (OUTPATIENT)
Dept: ENDOCRINOLOGY | Facility: CLINIC | Age: 64
End: 2024-09-26
Payer: COMMERCIAL

## 2024-09-26 VITALS
WEIGHT: 154 LBS | DIASTOLIC BLOOD PRESSURE: 76 MMHG | SYSTOLIC BLOOD PRESSURE: 130 MMHG | HEART RATE: 78 BPM | OXYGEN SATURATION: 98 % | HEIGHT: 63 IN | BODY MASS INDEX: 27.29 KG/M2

## 2024-09-26 DIAGNOSIS — E78.5 HYPERLIPIDEMIA, UNSPECIFIED: ICD-10-CM

## 2024-09-26 DIAGNOSIS — Z79.4 TYPE 2 DIABETES MELLITUS WITH HYPERGLYCEMIA: ICD-10-CM

## 2024-09-26 DIAGNOSIS — E55.9 VITAMIN D DEFICIENCY, UNSPECIFIED: ICD-10-CM

## 2024-09-26 DIAGNOSIS — E11.65 TYPE 2 DIABETES MELLITUS WITH HYPERGLYCEMIA: ICD-10-CM

## 2024-09-26 DIAGNOSIS — I10 ESSENTIAL (PRIMARY) HYPERTENSION: ICD-10-CM

## 2024-09-26 LAB — HBA1C MFR BLD HPLC: 8.1

## 2024-09-26 PROCEDURE — 83036 HEMOGLOBIN GLYCOSYLATED A1C: CPT | Mod: QW

## 2024-09-26 PROCEDURE — 99214 OFFICE O/P EST MOD 30 MIN: CPT

## 2024-09-26 NOTE — HISTORY OF PRESENT ILLNESS
[FreeTextEntry1] : Patient is a 63 F with history of T2D, HTN, HLD CAD s/p RCA stent, here for follow up for T2DM management. Last visit 2024 with me.   FH: father and sister, brother have diabetes. Son and daughter have diabetes  SH:denies smoking or alcohol use. . Working constantly on her feet    #  T2DM: Diabetes history: Diagnosed  for about 39 years, when she was 23 years old .   Most recent A1C 9.0 12/15/2022--> 7.4 2023--> 7.4 2023--> 10 2024-->  8.1 2024  Diabetes complications: Neuropathy: numbness in her fingers  Retinopathy: retinopathy and follows up with retina specialist (last eye exam a few weeks ago) Nephropathy: ACR negative (most recent Cr 0.79, GFR 85) CAD/MI/CVA: CAD with stent placement  DKA: no  Current DM medications:  - Basaglar 12 units QHS - aspart 10-12 units BID   - Metformin 1000 mg BID   - Trulicity 0.75 mg weekly - Jardiance  10 mg daily   Past DM medications:  - none   Finger sticks:  Fastin, 151, 100, 178, 139, 25, 115, 135, 171, 143, 125, 130, 81, 166, 150 Postprandial 105 Not checking glucose as often due to finger hurts   Diet:  Lunch  11am: snacks all day at work doesn't inject insulin at that time  Dinner 4 pm: salad with cranberries and nuts and small piece of chicken or fish Or chicken with noodles and beets or Roti with tomato and onions  Snacks:  fruits Drinks: coffee Exercise: none other than being active at job  # HTN - /76 - Lisinopril 10 mg daily and Metoprolol 25 ER mg daily    # HLD - LDL 40  2023 - On atorvastatin 40 mg daily   12/15 visit events: she is having a lot of stress in her life right now, she is taking care of her ex but she feels like she is not being appreciated. Previously sent for CGM, but not approved because patent is not checking her finger stick. She is barely checking once per day.   3/30/2023 visit events: feeling great. appetite has reduced. Lost some weight.   2023 visit events: feeling cold all the time. Appetite is low, not eating much. Lost weight. Feels otherwise good.   3/28/2024 visit events: insurance changed, so she is not able to get CGM covered. Having trouble keeping glucose controlled. Find that since insurance change, lots of things are no longer covered   2024 visit events: doing okay. A1C improved. Fixing her phone so she no longer has the [employer] [place].

## 2024-09-26 NOTE — ASSESSMENT
[FreeTextEntry1] : Patient is a 63 F with history of T2D, HTN, HLD CAD s/p RCA stent, here for follow up for T2DM management.   # T2D   - HgB A1C: 9.0 12/15/2022 -->7.4 03/30/2023--> 7.4 09/2023--> 10 02/2024--> 8.1 09/26/2024 - Complications: retinopathy, CAD - Aspirin: yes - Most recent urine microalbumin  ACR negative . ACE-I/ARB: yes - Most recent LDL:  40 09/2023 repeat today . On statin: yes - Opthalmology up to date: yes,  advised for yearly check up - Podiatry up to date: no advised for yearly check up - Patient to call for persistent glucose < 70 or > 300 - Patient counseled on the importance of consistent carbohydrate diet and regular physical activity  - Advised patient to continue checking FSG and to bring meter to all visits  - Symptoms of hypoglycemia discussed and advised the patient to call me to adjust insulin dosing if glucose <70 - Medications changes:  - We discussed the importance of monitoring glucose - Continue with Metformin 1000 mg BID - Increase Trulicity 1.5 mg weekly  - Continue with Jardiance 10 mg daily  - Decrease basaglar to 10 units QHS - Decrease aspart to 8-10 units TID before meals  - Patient on 4 times per day daily insulin injection would benefit from use of CGM for glycemic control and hypoglycemic prevention  - Has appt with son darnell on 10/9      # HTN - /76 - Continue with Lisinopril 10 mg daily and Metoprolol 25 ER mg daily    # HLD - LDL 40 09/2023, repeat today  - Continue with atorvastatin 40 mg daily   # Class 1 obesity  - BMI 31.71--> 29.23--> 25.51--> 27.5--> 27.2 - Lost about 30 pounds since last visit - titrate trulicity as above    # Vitamin D deficiency  - vitamin D 34.3 09/2023 - Repeat today   FOLLOW UP: I recommend that next visit for diabetes care be in 3 month with NP and then 3 months with CDE  To do at next clinic visit -Titrate insulin based on glucose  - Repeat blood work at next visit   Tana Vargas MD Endocrinology, Diabetes and Metabolism [address] [facility] [place] [address] Tel [phone] Fax [phone]

## 2024-09-27 LAB
25(OH)D3 SERPL-MCNC: 32 NG/ML
ALBUMIN SERPL ELPH-MCNC: 4.5 G/DL
ALP BLD-CCNC: 76 U/L
ALT SERPL-CCNC: 19 U/L
ANION GAP SERPL CALC-SCNC: 14 MMOL/L
AST SERPL-CCNC: 19 U/L
BILIRUB SERPL-MCNC: 0.3 MG/DL
BUN SERPL-MCNC: 11 MG/DL
CALCIUM SERPL-MCNC: 10.2 MG/DL
CHLORIDE SERPL-SCNC: 101 MMOL/L
CHOLEST SERPL-MCNC: 150 MG/DL
CO2 SERPL-SCNC: 28 MMOL/L
CREAT SERPL-MCNC: 0.72 MG/DL
CREAT SPEC-SCNC: 104 MG/DL
EGFR: 94 ML/MIN/1.73M2
GLUCOSE SERPL-MCNC: 70 MG/DL
HDLC SERPL-MCNC: 71 MG/DL
LDLC SERPL CALC-MCNC: 53 MG/DL
MICROALBUMIN 24H UR DL<=1MG/L-MCNC: <1.2 MG/DL
MICROALBUMIN/CREAT 24H UR-RTO: NORMAL MG/G
NONHDLC SERPL-MCNC: 79 MG/DL
POTASSIUM SERPL-SCNC: 4.9 MMOL/L
PROT SERPL-MCNC: 7.5 G/DL
SODIUM SERPL-SCNC: 143 MMOL/L
TRIGL SERPL-MCNC: 160 MG/DL

## 2024-10-09 ENCOUNTER — APPOINTMENT (OUTPATIENT)
Dept: ENDOCRINOLOGY | Facility: CLINIC | Age: 64
End: 2024-10-09
Payer: COMMERCIAL

## 2024-10-09 PROCEDURE — G0108 DIAB MANAGE TRN  PER INDIV: CPT

## 2024-10-17 ENCOUNTER — APPOINTMENT (OUTPATIENT)
Dept: INTERNAL MEDICINE | Facility: CLINIC | Age: 64
End: 2024-10-17

## 2024-10-18 RX ORDER — BLOOD SUGAR DIAGNOSTIC
STRIP MISCELLANEOUS 3 TIMES DAILY
Qty: 2 | Refills: 3 | Status: ACTIVE | COMMUNITY
Start: 2024-10-18 | End: 1900-01-01

## 2024-10-30 ENCOUNTER — NON-APPOINTMENT (OUTPATIENT)
Age: 64
End: 2024-10-30

## 2024-10-30 PROCEDURE — 95251 CONT GLUC MNTR ANALYSIS I&R: CPT

## 2024-11-06 ENCOUNTER — APPOINTMENT (OUTPATIENT)
Dept: CARDIOLOGY | Facility: CLINIC | Age: 64
End: 2024-11-06

## 2024-11-06 ENCOUNTER — NON-APPOINTMENT (OUTPATIENT)
Age: 64
End: 2024-11-06

## 2024-11-06 VITALS
HEIGHT: 63 IN | BODY MASS INDEX: 27.29 KG/M2 | SYSTOLIC BLOOD PRESSURE: 118 MMHG | DIASTOLIC BLOOD PRESSURE: 75 MMHG | HEART RATE: 77 BPM | WEIGHT: 154 LBS | OXYGEN SATURATION: 100 %

## 2024-11-06 PROCEDURE — 93000 ELECTROCARDIOGRAM COMPLETE: CPT

## 2024-11-06 PROCEDURE — G2211 COMPLEX E/M VISIT ADD ON: CPT

## 2024-11-06 PROCEDURE — 99214 OFFICE O/P EST MOD 30 MIN: CPT

## 2024-11-06 RX ORDER — DULAGLUTIDE 1.5 MG/.5ML
1.5 INJECTION, SOLUTION SUBCUTANEOUS
Qty: 1 | Refills: 2 | Status: DISCONTINUED | COMMUNITY
Start: 2024-10-31 | End: 2024-11-06

## 2024-11-07 ENCOUNTER — APPOINTMENT (OUTPATIENT)
Dept: INTERNAL MEDICINE | Facility: CLINIC | Age: 64
End: 2024-11-07

## 2024-11-12 ENCOUNTER — APPOINTMENT (OUTPATIENT)
Dept: OPHTHALMOLOGY | Facility: CLINIC | Age: 64
End: 2024-11-12
Payer: COMMERCIAL

## 2024-11-12 PROCEDURE — 92014 COMPRE OPH EXAM EST PT 1/>: CPT

## 2024-11-12 PROCEDURE — 92025 CPTRIZED CORNEAL TOPOGRAPHY: CPT

## 2024-11-12 PROCEDURE — 92134 CPTRZ OPH DX IMG PST SGM RTA: CPT

## 2024-11-12 PROCEDURE — 92250 FUNDUS PHOTOGRAPHY W/I&R: CPT | Mod: 59

## 2024-11-13 ENCOUNTER — RESULT REVIEW (OUTPATIENT)
Age: 64
End: 2024-11-13

## 2024-11-13 ENCOUNTER — OUTPATIENT (OUTPATIENT)
Dept: OUTPATIENT SERVICES | Facility: HOSPITAL | Age: 64
LOS: 1 days | End: 2024-11-13
Payer: COMMERCIAL

## 2024-11-13 ENCOUNTER — APPOINTMENT (OUTPATIENT)
Dept: INTERNAL MEDICINE | Facility: CLINIC | Age: 64
End: 2024-11-13
Payer: COMMERCIAL

## 2024-11-13 ENCOUNTER — NON-APPOINTMENT (OUTPATIENT)
Age: 64
End: 2024-11-13

## 2024-11-13 VITALS
WEIGHT: 155 LBS | OXYGEN SATURATION: 98 % | BODY MASS INDEX: 27.46 KG/M2 | HEART RATE: 82 BPM | HEIGHT: 63 IN | SYSTOLIC BLOOD PRESSURE: 122 MMHG | DIASTOLIC BLOOD PRESSURE: 62 MMHG

## 2024-11-13 DIAGNOSIS — I25.10 ATHEROSCLEROTIC HEART DISEASE OF NATIVE CORONARY ARTERY WITHOUT ANGINA PECTORIS: ICD-10-CM

## 2024-11-13 DIAGNOSIS — E78.5 HYPERLIPIDEMIA, UNSPECIFIED: ICD-10-CM

## 2024-11-13 DIAGNOSIS — Z98.89 OTHER SPECIFIED POSTPROCEDURAL STATES: Chronic | ICD-10-CM

## 2024-11-13 DIAGNOSIS — Z79.4 TYPE 2 DIABETES MELLITUS WITH HYPERGLYCEMIA: ICD-10-CM

## 2024-11-13 DIAGNOSIS — E11.65 TYPE 2 DIABETES MELLITUS WITH HYPERGLYCEMIA: ICD-10-CM

## 2024-11-13 DIAGNOSIS — I25.10 ATHEROSCLEROTIC HEART DISEASE OF NATIVE CORONARY ARTERY W/OUT ANGINA PECTORIS: ICD-10-CM

## 2024-11-13 DIAGNOSIS — Z83.3 FAMILY HISTORY OF DIABETES MELLITUS: ICD-10-CM

## 2024-11-13 DIAGNOSIS — Z23 ENCOUNTER FOR IMMUNIZATION: ICD-10-CM

## 2024-11-13 DIAGNOSIS — H92.09 OTALGIA, UNSPECIFIED EAR: ICD-10-CM

## 2024-11-13 DIAGNOSIS — I10 ESSENTIAL (PRIMARY) HYPERTENSION: ICD-10-CM

## 2024-11-13 DIAGNOSIS — Z00.00 ENCOUNTER FOR GENERAL ADULT MEDICAL EXAMINATION W/OUT ABNORMAL FINDINGS: ICD-10-CM

## 2024-11-13 DIAGNOSIS — Z79.4 LONG TERM (CURRENT) USE OF INSULIN: ICD-10-CM

## 2024-11-13 PROCEDURE — 99213 OFFICE O/P EST LOW 20 MIN: CPT | Mod: GE

## 2024-11-14 DIAGNOSIS — E83.52 HYPERCALCEMIA: ICD-10-CM

## 2024-11-14 DIAGNOSIS — I10 ESSENTIAL (PRIMARY) HYPERTENSION: ICD-10-CM

## 2024-11-14 LAB
ALBUMIN SERPL ELPH-MCNC: 4.6 G/DL
ALP BLD-CCNC: 83 U/L
ALT SERPL-CCNC: 17 U/L
ANION GAP SERPL CALC-SCNC: 12 MMOL/L
AST SERPL-CCNC: 16 U/L
BILIRUB SERPL-MCNC: 0.4 MG/DL
BUN SERPL-MCNC: 16 MG/DL
CALCIUM SERPL-MCNC: 11 MG/DL
CHLORIDE SERPL-SCNC: 99 MMOL/L
CHOLEST SERPL-MCNC: 162 MG/DL
CO2 SERPL-SCNC: 28 MMOL/L
CREAT SERPL-MCNC: 0.71 MG/DL
CREAT SPEC-SCNC: 70 MG/DL
EGFR: 95 ML/MIN/1.73M2
ESTIMATED AVERAGE GLUCOSE: 174 MG/DL
GLUCOSE SERPL-MCNC: 149 MG/DL
HBA1C MFR BLD HPLC: 7.7 %
HCT VFR BLD CALC: 42.6 %
HDLC SERPL-MCNC: 80 MG/DL
HGB BLD-MCNC: 13 G/DL
LDLC SERPL CALC-MCNC: 65 MG/DL
MCHC RBC-ENTMCNC: 27.3 PG
MCHC RBC-ENTMCNC: 30.5 G/DL
MCV RBC AUTO: 89.5 FL
MICROALBUMIN 24H UR DL<=1MG/L-MCNC: <1.2 MG/DL
MICROALBUMIN/CREAT 24H UR-RTO: NORMAL MG/G
NONHDLC SERPL-MCNC: 82 MG/DL
PLATELET # BLD AUTO: 319 K/UL
POTASSIUM SERPL-SCNC: 5 MMOL/L
PROT SERPL-MCNC: 7.6 G/DL
RBC # BLD: 4.76 M/UL
RBC # FLD: 12.5 %
SODIUM SERPL-SCNC: 139 MMOL/L
TRIGL SERPL-MCNC: 97 MG/DL
TSH SERPL-ACNC: 0.49 UIU/ML
WBC # FLD AUTO: 7.22 K/UL

## 2024-11-15 PROCEDURE — 82652 VIT D 1 25-DIHYDROXY: CPT

## 2024-11-15 PROCEDURE — G0463: CPT

## 2024-11-15 PROCEDURE — 83970 ASSAY OF PARATHORMONE: CPT

## 2024-11-15 PROCEDURE — G0008: CPT

## 2024-11-15 PROCEDURE — 82330 ASSAY OF CALCIUM: CPT

## 2024-11-15 PROCEDURE — 84100 ASSAY OF PHOSPHORUS: CPT

## 2024-11-15 PROCEDURE — 82340 ASSAY OF CALCIUM IN URINE: CPT

## 2024-11-15 PROCEDURE — 90656 IIV3 VACC NO PRSV 0.5 ML IM: CPT

## 2024-11-15 PROCEDURE — 82306 VITAMIN D 25 HYDROXY: CPT

## 2024-11-18 LAB
24R-OH-CALCIDIOL SERPL-MCNC: 36.9 PG/ML
25(OH)D3 SERPL-MCNC: 32.6 NG/ML
CA-I SERPL-SCNC: 5.2 MG/DL
CALCIUM ?TM UR-MCNC: 3.9 MG/DL
PARATHYROID HORMONE INTACT: 34 PG/ML
PHOSPHATE SERPL-MCNC: 3.6 MG/DL

## 2024-11-20 ENCOUNTER — RESULT REVIEW (OUTPATIENT)
Age: 64
End: 2024-11-20

## 2024-11-20 ENCOUNTER — APPOINTMENT (OUTPATIENT)
Dept: MAMMOGRAPHY | Facility: CLINIC | Age: 64
End: 2024-11-20

## 2024-11-20 PROCEDURE — 77063 BREAST TOMOSYNTHESIS BI: CPT

## 2024-11-20 PROCEDURE — 77067 SCR MAMMO BI INCL CAD: CPT

## 2024-12-11 ENCOUNTER — APPOINTMENT (OUTPATIENT)
Dept: ENDOCRINOLOGY | Facility: CLINIC | Age: 64
End: 2024-12-11
Payer: COMMERCIAL

## 2024-12-11 PROCEDURE — G0108 DIAB MANAGE TRN  PER INDIV: CPT

## 2025-01-15 ENCOUNTER — NON-APPOINTMENT (OUTPATIENT)
Age: 65
End: 2025-01-15

## 2025-01-15 ENCOUNTER — APPOINTMENT (OUTPATIENT)
Dept: OTOLARYNGOLOGY | Facility: CLINIC | Age: 65
End: 2025-01-15
Payer: COMMERCIAL

## 2025-01-15 VITALS
DIASTOLIC BLOOD PRESSURE: 59 MMHG | SYSTOLIC BLOOD PRESSURE: 103 MMHG | WEIGHT: 154 LBS | HEART RATE: 78 BPM | HEIGHT: 64 IN | BODY MASS INDEX: 26.29 KG/M2

## 2025-01-15 DIAGNOSIS — H61.22 IMPACTED CERUMEN, LEFT EAR: ICD-10-CM

## 2025-01-15 PROCEDURE — 99203 OFFICE O/P NEW LOW 30 MIN: CPT | Mod: 25

## 2025-01-15 PROCEDURE — 69210 REMOVE IMPACTED EAR WAX UNI: CPT

## 2025-01-29 ENCOUNTER — APPOINTMENT (OUTPATIENT)
Dept: ENDOCRINOLOGY | Facility: CLINIC | Age: 65
End: 2025-01-29

## 2025-03-06 ENCOUNTER — APPOINTMENT (OUTPATIENT)
Dept: ENDOCRINOLOGY | Facility: CLINIC | Age: 65
End: 2025-03-06

## 2025-04-30 ENCOUNTER — APPOINTMENT (OUTPATIENT)
Dept: ENDOCRINOLOGY | Facility: CLINIC | Age: 65
End: 2025-04-30
Payer: COMMERCIAL

## 2025-04-30 VITALS
BODY MASS INDEX: 28.17 KG/M2 | SYSTOLIC BLOOD PRESSURE: 114 MMHG | OXYGEN SATURATION: 99 % | DIASTOLIC BLOOD PRESSURE: 70 MMHG | HEART RATE: 86 BPM | WEIGHT: 165 LBS | HEIGHT: 64 IN

## 2025-04-30 DIAGNOSIS — E78.5 HYPERLIPIDEMIA, UNSPECIFIED: ICD-10-CM

## 2025-04-30 DIAGNOSIS — E55.9 VITAMIN D DEFICIENCY, UNSPECIFIED: ICD-10-CM

## 2025-04-30 DIAGNOSIS — Z79.4 TYPE 2 DIABETES MELLITUS WITH HYPERGLYCEMIA: ICD-10-CM

## 2025-04-30 DIAGNOSIS — E11.65 TYPE 2 DIABETES MELLITUS WITH HYPERGLYCEMIA: ICD-10-CM

## 2025-04-30 DIAGNOSIS — I10 ESSENTIAL (PRIMARY) HYPERTENSION: ICD-10-CM

## 2025-04-30 PROCEDURE — G2211 COMPLEX E/M VISIT ADD ON: CPT

## 2025-04-30 PROCEDURE — 82962 GLUCOSE BLOOD TEST: CPT

## 2025-04-30 PROCEDURE — 83036 HEMOGLOBIN GLYCOSYLATED A1C: CPT | Mod: QW

## 2025-04-30 PROCEDURE — 99214 OFFICE O/P EST MOD 30 MIN: CPT

## 2025-05-02 LAB
GLUCOSE BLDC GLUCOMTR-MCNC: 101
HBA1C MFR BLD HPLC: 8.2

## 2025-05-07 ENCOUNTER — APPOINTMENT (OUTPATIENT)
Dept: CARDIOLOGY | Facility: CLINIC | Age: 65
End: 2025-05-07

## 2025-05-07 VITALS
HEART RATE: 65 BPM | BODY MASS INDEX: 28.17 KG/M2 | SYSTOLIC BLOOD PRESSURE: 127 MMHG | WEIGHT: 165 LBS | DIASTOLIC BLOOD PRESSURE: 78 MMHG | HEIGHT: 64 IN | OXYGEN SATURATION: 97 %

## 2025-05-07 PROCEDURE — 99214 OFFICE O/P EST MOD 30 MIN: CPT

## 2025-05-07 PROCEDURE — G2211 COMPLEX E/M VISIT ADD ON: CPT

## 2025-05-07 PROCEDURE — 93000 ELECTROCARDIOGRAM COMPLETE: CPT

## 2025-05-07 RX ORDER — DULAGLUTIDE 1.5 MG/.5ML
1.5 INJECTION, SOLUTION SUBCUTANEOUS
Qty: 3 | Refills: 0 | Status: DISCONTINUED | COMMUNITY
Start: 2025-04-30 | End: 2025-05-07

## 2025-05-13 ENCOUNTER — RX RENEWAL (OUTPATIENT)
Age: 65
End: 2025-05-13

## 2025-05-29 RX ORDER — DULAGLUTIDE 1.5 MG/.5ML
1.5 INJECTION, SOLUTION SUBCUTANEOUS
Qty: 4 | Refills: 3 | Status: ACTIVE | COMMUNITY
Start: 2025-05-29 | End: 1900-01-01

## 2025-06-03 ENCOUNTER — OUTPATIENT (OUTPATIENT)
Dept: OUTPATIENT SERVICES | Facility: HOSPITAL | Age: 65
LOS: 1 days | End: 2025-06-03
Payer: COMMERCIAL

## 2025-06-03 ENCOUNTER — APPOINTMENT (OUTPATIENT)
Dept: GASTROENTEROLOGY | Facility: HOSPITAL | Age: 65
End: 2025-06-03
Payer: COMMERCIAL

## 2025-06-03 VITALS
HEART RATE: 75 BPM | SYSTOLIC BLOOD PRESSURE: 125 MMHG | OXYGEN SATURATION: 100 % | BODY MASS INDEX: 28.17 KG/M2 | DIASTOLIC BLOOD PRESSURE: 78 MMHG | TEMPERATURE: 98.1 F | WEIGHT: 165 LBS | HEIGHT: 64 IN | RESPIRATION RATE: 14 BRPM

## 2025-06-03 DIAGNOSIS — K57.30 DIVERTICULOSIS OF LARGE INTESTINE W/OUT PERFORATION OR ABSCESS W/OUT BLEEDING: ICD-10-CM

## 2025-06-03 DIAGNOSIS — D12.6 BENIGN NEOPLASM OF COLON, UNSPECIFIED: ICD-10-CM

## 2025-06-03 DIAGNOSIS — Z98.89 OTHER SPECIFIED POSTPROCEDURAL STATES: Chronic | ICD-10-CM

## 2025-06-03 DIAGNOSIS — R10.9 UNSPECIFIED ABDOMINAL PAIN: ICD-10-CM

## 2025-06-03 PROCEDURE — 99203 OFFICE O/P NEW LOW 30 MIN: CPT

## 2025-06-03 PROCEDURE — G0463: CPT

## 2025-06-03 RX ORDER — POLYETHYLENE GLYCOL 3350 AND ELECTROLYTES WITH LEMON FLAVOR 236; 22.74; 6.74; 5.86; 2.97 G/4L; G/4L; G/4L; G/4L; G/4L
236 POWDER, FOR SOLUTION ORAL
Qty: 1 | Refills: 0 | Status: ACTIVE | COMMUNITY
Start: 2025-06-03 | End: 1900-01-01

## 2025-06-04 DIAGNOSIS — K57.30 DIVERTICULOSIS OF LARGE INTESTINE WITHOUT PERFORATION OR ABSCESS WITHOUT BLEEDING: ICD-10-CM

## 2025-06-04 DIAGNOSIS — D12.6 BENIGN NEOPLASM OF COLON, UNSPECIFIED: ICD-10-CM

## 2025-06-11 ENCOUNTER — APPOINTMENT (OUTPATIENT)
Dept: INTERNAL MEDICINE | Facility: CLINIC | Age: 65
End: 2025-06-11

## 2025-06-18 ENCOUNTER — RX RENEWAL (OUTPATIENT)
Age: 65
End: 2025-06-18

## 2025-06-30 ENCOUNTER — RX RENEWAL (OUTPATIENT)
Age: 65
End: 2025-06-30

## 2025-07-02 ENCOUNTER — APPOINTMENT (OUTPATIENT)
Dept: INTERNAL MEDICINE | Facility: CLINIC | Age: 65
End: 2025-07-02

## 2025-07-02 ENCOUNTER — OUTPATIENT (OUTPATIENT)
Dept: OUTPATIENT SERVICES | Facility: HOSPITAL | Age: 65
LOS: 1 days | End: 2025-07-02

## 2025-07-02 VITALS
WEIGHT: 152 LBS | HEART RATE: 76 BPM | HEIGHT: 64 IN | DIASTOLIC BLOOD PRESSURE: 64 MMHG | BODY MASS INDEX: 25.95 KG/M2 | OXYGEN SATURATION: 97 % | SYSTOLIC BLOOD PRESSURE: 116 MMHG

## 2025-07-02 DIAGNOSIS — Z98.89 OTHER SPECIFIED POSTPROCEDURAL STATES: Chronic | ICD-10-CM

## 2025-07-02 PROCEDURE — G0463: CPT

## 2025-07-02 PROCEDURE — 99213 OFFICE O/P EST LOW 20 MIN: CPT | Mod: GE

## 2025-07-02 PROCEDURE — 83036 HEMOGLOBIN GLYCOSYLATED A1C: CPT

## 2025-07-03 DIAGNOSIS — I10 ESSENTIAL (PRIMARY) HYPERTENSION: ICD-10-CM

## 2025-07-03 LAB
ALBUMIN SERPL ELPH-MCNC: 4.6 G/DL
ALP BLD-CCNC: 77 U/L
ALT SERPL-CCNC: 21 U/L
ANION GAP SERPL CALC-SCNC: 15 MMOL/L
AST SERPL-CCNC: 18 U/L
BASOPHILS # BLD AUTO: 0.06 K/UL
BASOPHILS NFR BLD AUTO: 0.7 %
BILIRUB SERPL-MCNC: 0.3 MG/DL
BUN SERPL-MCNC: 14 MG/DL
CALCIUM SERPL-MCNC: 10.5 MG/DL
CHLORIDE SERPL-SCNC: 101 MMOL/L
CHOLEST SERPL-MCNC: 160 MG/DL
CO2 SERPL-SCNC: 23 MMOL/L
CREAT SERPL-MCNC: 0.72 MG/DL
EGFRCR SERPLBLD CKD-EPI 2021: 93 ML/MIN/1.73M2
EOSINOPHIL # BLD AUTO: 0.11 K/UL
EOSINOPHIL NFR BLD AUTO: 1.2 %
ESTIMATED AVERAGE GLUCOSE: 209 MG/DL
GLUCOSE SERPL-MCNC: 100 MG/DL
HBA1C MFR BLD HPLC: 8.7
HBA1C MFR BLD HPLC: 8.9 %
HCT VFR BLD CALC: 41.2 %
HDLC SERPL-MCNC: 81 MG/DL
HGB BLD-MCNC: 12.9 G/DL
IMM GRANULOCYTES NFR BLD AUTO: 0.2 %
LDLC SERPL-MCNC: 59 MG/DL
LYMPHOCYTES # BLD AUTO: 3.36 K/UL
LYMPHOCYTES NFR BLD AUTO: 36.8 %
MAN DIFF?: NORMAL
MCHC RBC-ENTMCNC: 27.8 PG
MCHC RBC-ENTMCNC: 31.3 G/DL
MCV RBC AUTO: 88.8 FL
MONOCYTES # BLD AUTO: 0.69 K/UL
MONOCYTES NFR BLD AUTO: 7.6 %
NEUTROPHILS # BLD AUTO: 4.88 K/UL
NEUTROPHILS NFR BLD AUTO: 53.5 %
NONHDLC SERPL-MCNC: 80 MG/DL
PLATELET # BLD AUTO: 225 K/UL
POTASSIUM SERPL-SCNC: 5.1 MMOL/L
PROT SERPL-MCNC: 7.2 G/DL
RBC # BLD: 4.64 M/UL
RBC # FLD: 12.6 %
SODIUM SERPL-SCNC: 140 MMOL/L
TRIGL SERPL-MCNC: 123 MG/DL
TSH SERPL-ACNC: 0.41 UIU/ML
WBC # FLD AUTO: 9.12 K/UL

## 2025-07-08 ENCOUNTER — OUTPATIENT (OUTPATIENT)
Dept: OUTPATIENT SERVICES | Facility: HOSPITAL | Age: 65
LOS: 1 days | End: 2025-07-08
Payer: COMMERCIAL

## 2025-07-08 VITALS
OXYGEN SATURATION: 99 % | DIASTOLIC BLOOD PRESSURE: 76 MMHG | RESPIRATION RATE: 16 BRPM | HEIGHT: 64 IN | SYSTOLIC BLOOD PRESSURE: 121 MMHG | WEIGHT: 151.9 LBS | HEART RATE: 72 BPM | TEMPERATURE: 98 F

## 2025-07-08 DIAGNOSIS — E11.9 TYPE 2 DIABETES MELLITUS WITHOUT COMPLICATIONS: ICD-10-CM

## 2025-07-08 DIAGNOSIS — Z98.89 OTHER SPECIFIED POSTPROCEDURAL STATES: Chronic | ICD-10-CM

## 2025-07-08 DIAGNOSIS — I10 ESSENTIAL (PRIMARY) HYPERTENSION: ICD-10-CM

## 2025-07-08 DIAGNOSIS — Z12.11 ENCOUNTER FOR SCREENING FOR MALIGNANT NEOPLASM OF COLON: ICD-10-CM

## 2025-07-08 DIAGNOSIS — Z98.890 OTHER SPECIFIED POSTPROCEDURAL STATES: Chronic | ICD-10-CM

## 2025-07-08 DIAGNOSIS — D12.6 BENIGN NEOPLASM OF COLON, UNSPECIFIED: ICD-10-CM

## 2025-07-08 DIAGNOSIS — Z01.818 ENCOUNTER FOR OTHER PREPROCEDURAL EXAMINATION: ICD-10-CM

## 2025-07-08 PROCEDURE — G0463: CPT

## 2025-07-08 NOTE — H&P PST ADULT - NSICDXPASTSURGICALHX_GEN_ALL_CORE_FT
PAST SURGICAL HISTORY:  History of       PAST SURGICAL HISTORY:  H/O colonoscopy     History of

## 2025-07-08 NOTE — H&P PST ADULT - NSICDXPASTMEDICALHX_GEN_ALL_CORE_FT
PAST MEDICAL HISTORY:  Asthma     Benign Essential Hypertension     Diabetes mellitus     HLD (Hyperlipidemia)     Sarcoidosis

## 2025-07-08 NOTE — H&P PST ADULT - PROBLEM SELECTOR PLAN 3
Pt. will continue Metroprolol and lisinopril as indicated, including am of procedure with sip of water

## 2025-07-08 NOTE — H&P PST ADULT - ASSESSMENT
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

## 2025-07-08 NOTE — H&P PST ADULT - HISTORY OF PRESENT ILLNESS
patients HA1C was 8.7 despite trulicity change. Although the patient has been staying active and losing weight her HA1C increased due to lack of dietary restriction. She also does not use her dexcom for which she has not been checking her sugars consistently. The patient is ammenable to restarting dexcom use and increasing jardiance to 25mg QD. 65 yo female, PMH HTN, HLD, CAD s/p RCA stent 2017, T2DM on metformin, Insulin, Jardiance andTrulicity (tolerating without s/e), HgA1c on 4/30/25 8.2% and Trulicity increased by endo, had a physical with blood work on 7/2/25 with HgA1c 8.9% and Jardiance increased to 25 mg. - pt. has not started wearing CGM and does not take BS at home, she states she has lost weight by eating more fruits and vegetables. Pt. had a colonoscopy with polyps removed in 2020 and returns to [facility] for scheduled Colonoscopy on 7/16/25 for cancer screening. Denies recent fever, chills, chest pain, SOB, changes in bowel/urinary habits or recent exposure to COVID-19 infection. Pt. denies clotting or bleeding disorders.

## 2025-07-08 NOTE — H&P PST ADULT - NSICDXFAMILYHX_GEN_ALL_CORE_FT
FAMILY HISTORY:  Father  Still living? Unknown  Family history of diabetes mellitus, Age at diagnosis: Age Unknown  Family history of heart disease, Age at diagnosis: Age Unknown    Mother  Still living? Unknown  Family history of essential hypertension, Age at diagnosis: Age Unknown

## 2025-07-08 NOTE — H&P PST ADULT - PROBLEM SELECTOR PLAN 1
Colonoscopy on 7/16/25  Pre-op instructions provided - all questions answered  Labs from 7/2/25 in [facility]  HgA1c 8.9% - email sent to Dr. Bennett    Pt. will continue aspirin 81 mg. during clifton-op period for cardiac stent protection

## 2025-07-08 NOTE — H&P PST ADULT - PROBLEM SELECTOR PLAN 2
Shravan last dose pre-op 7/3/25  Jardiance hold 3 days pre-op, last dose 7/12  Basaglar will decrease to half night before due to liquid diet day before procedure and colon prep  Pt. will hold metformin and regular insulin am of surgery Shravan last dose pre-op 7/3/25  Jardiance hold 3 days pre-op, last dose 7/12  Basaglar will decrease to half night before due to liquid diet day before procedure and colon prep  Pt. will hold metformin and regular insulin am of surgery    HgA1c 8.9 from 7/2/25 - Dr. Bennett made aware via e-mail

## 2025-07-10 ENCOUNTER — APPOINTMENT (OUTPATIENT)
Dept: OPHTHALMOLOGY | Facility: CLINIC | Age: 65
End: 2025-07-10
Payer: COMMERCIAL

## 2025-07-10 ENCOUNTER — NON-APPOINTMENT (OUTPATIENT)
Age: 65
End: 2025-07-10

## 2025-07-10 PROCEDURE — 92134 CPTRZ OPH DX IMG PST SGM RTA: CPT

## 2025-07-10 PROCEDURE — 92014 COMPRE OPH EXAM EST PT 1/>: CPT

## 2025-07-11 LAB
CREAT 24H UR-MCNC: 0.7 G/24 HR
CREAT ?TM UR-MCNC: 30 MG/DL
MICROALBUMIN 24H UR DL<=1MG/L-MRATE: <29.7 MG/24HR
MICROALBUMIN ?TM UR-MRATE: <20.6 UG/MIN
PROT ?TM UR-MCNC: 24 HR
SPECIMEN VOL 24H UR: 2475 ML

## 2025-07-16 ENCOUNTER — TRANSCRIPTION ENCOUNTER (OUTPATIENT)
Age: 65
End: 2025-07-16

## 2025-07-16 ENCOUNTER — RESULT REVIEW (OUTPATIENT)
Age: 65
End: 2025-07-16

## 2025-07-16 ENCOUNTER — OUTPATIENT (OUTPATIENT)
Dept: OUTPATIENT SERVICES | Facility: HOSPITAL | Age: 65
LOS: 1 days | End: 2025-07-16
Payer: COMMERCIAL

## 2025-07-16 VITALS
HEIGHT: 64 IN | SYSTOLIC BLOOD PRESSURE: 121 MMHG | DIASTOLIC BLOOD PRESSURE: 57 MMHG | OXYGEN SATURATION: 97 % | HEART RATE: 62 BPM | RESPIRATION RATE: 20 BRPM | WEIGHT: 151.9 LBS | TEMPERATURE: 98 F

## 2025-07-16 VITALS
RESPIRATION RATE: 20 BRPM | OXYGEN SATURATION: 100 % | DIASTOLIC BLOOD PRESSURE: 81 MMHG | HEART RATE: 59 BPM | SYSTOLIC BLOOD PRESSURE: 124 MMHG

## 2025-07-16 DIAGNOSIS — D12.6 BENIGN NEOPLASM OF COLON, UNSPECIFIED: ICD-10-CM

## 2025-07-16 DIAGNOSIS — Z98.89 OTHER SPECIFIED POSTPROCEDURAL STATES: Chronic | ICD-10-CM

## 2025-07-16 DIAGNOSIS — Z98.890 OTHER SPECIFIED POSTPROCEDURAL STATES: Chronic | ICD-10-CM

## 2025-07-16 LAB — GLUCOSE BLDC GLUCOMTR-MCNC: 202 MG/DL — HIGH (ref 70–99)

## 2025-07-16 PROCEDURE — 88305 TISSUE EXAM BY PATHOLOGIST: CPT | Mod: 26

## 2025-07-16 PROCEDURE — 45380 COLONOSCOPY AND BIOPSY: CPT | Mod: PT

## 2025-07-16 PROCEDURE — 88305 TISSUE EXAM BY PATHOLOGIST: CPT

## 2025-07-16 PROCEDURE — 45380 COLONOSCOPY AND BIOPSY: CPT | Mod: GC,33

## 2025-07-16 PROCEDURE — 82962 GLUCOSE BLOOD TEST: CPT

## 2025-07-16 DEVICE — NET RETRV ROT ROTH 2.5MMX230CM: Type: IMPLANTABLE DEVICE | Status: FUNCTIONAL

## 2025-07-16 RX ORDER — ATORVASTATIN CALCIUM 80 MG/1
1 TABLET, FILM COATED ORAL
Refills: 0 | DISCHARGE

## 2025-07-16 RX ORDER — DULAGLUTIDE 4.5 MG/.5ML
1.5 INJECTION, SOLUTION SUBCUTANEOUS
Refills: 0 | DISCHARGE

## 2025-07-16 RX ORDER — ASPIRIN 325 MG
1 TABLET ORAL
Refills: 0 | DISCHARGE

## 2025-07-16 RX ORDER — LISINOPRIL 5 MG/1
1 TABLET ORAL
Refills: 0 | DISCHARGE

## 2025-07-16 RX ORDER — INSULIN GLARGINE-YFGN 100 [IU]/ML
12 INJECTION, SOLUTION SUBCUTANEOUS
Refills: 0 | DISCHARGE

## 2025-07-16 RX ORDER — EMPAGLIFLOZIN 25 MG/1
1 TABLET, FILM COATED ORAL
Refills: 0 | DISCHARGE

## 2025-07-16 RX ORDER — METOPROLOL SUCCINATE 50 MG/1
1 TABLET, EXTENDED RELEASE ORAL
Refills: 0 | DISCHARGE

## 2025-07-16 NOTE — ASU DISCHARGE PLAN (ADULT/PEDIATRIC) - FINANCIAL ASSISTANCE
[facility] provides services at a reduced cost to those who are determined to be eligible through [facility]’s financial assistance program. Information regarding [facility]’s financial assistance program can be found by going to [email or web address] or by calling [phone].

## 2025-07-16 NOTE — ASU DISCHARGE PLAN (ADULT/PEDIATRIC) - NS MD DC FALL RISK RISK
For information on Fall & Injury Prevention, visit: [email or web address] OR  [email or web address] OR  https://www.cdc.gov/steadi/patient.html

## 2025-07-16 NOTE — ASU PREOP CHECKLIST - NS PREOP CHK MONITOR ANESTHESIA CONSENT
58yo F ho hypothyroidism and mitral valve prolapse on atenolol, pw mid back pain when taking a deep breath. started today. no sob, no exertional chest pain or sob. not currently in pain. also noted for last few days has been having right calf pain, no leg swleling, no ho dvt/pe, no exogenous estrogen. no cuogh, no fevers. no injury or trauma to leg or back done

## 2025-07-16 NOTE — ASU PATIENT PROFILE, ADULT - INTERNATIONAL TRAVEL
Wt Readings from Last 3 Encounters:   07/25/24 (!) 171 kg (378 lb)   07/22/24 (!) 166 kg (367 lb 1.1 oz)   09/28/23 (!) 184 kg (405 lb 6.4 oz)   Recent hospital stay for increased SOB  EF of 50%  Treated with IV diuretics  On exam today LS decreased, 99% on RA  Chronic LE edema  Continue Lasix 40 mg BID  Continue Jardiance 10 mg daily  Continue metoprolol 50 mg q 12  Continue spironolactone 25 mg daily   No

## 2025-07-18 LAB — SURGICAL PATHOLOGY STUDY: SIGNIFICANT CHANGE UP

## 2025-08-07 ENCOUNTER — RX RENEWAL (OUTPATIENT)
Age: 65
End: 2025-08-07

## 2025-08-07 RX ORDER — DULAGLUTIDE 0.75 MG/.5ML
0.75 INJECTION, SOLUTION SUBCUTANEOUS
Qty: 3 | Refills: 0 | Status: ACTIVE | COMMUNITY
Start: 2025-08-07 | End: 1900-01-01

## 2025-08-20 ENCOUNTER — APPOINTMENT (OUTPATIENT)
Dept: ENDOCRINOLOGY | Facility: CLINIC | Age: 65
End: 2025-08-20
Payer: COMMERCIAL

## 2025-08-20 VITALS
OXYGEN SATURATION: 98 % | BODY MASS INDEX: 25.51 KG/M2 | DIASTOLIC BLOOD PRESSURE: 60 MMHG | HEIGHT: 64 IN | SYSTOLIC BLOOD PRESSURE: 106 MMHG | WEIGHT: 149.4 LBS | HEART RATE: 72 BPM

## 2025-08-20 DIAGNOSIS — E55.9 VITAMIN D DEFICIENCY, UNSPECIFIED: ICD-10-CM

## 2025-08-20 DIAGNOSIS — E11.65 TYPE 2 DIABETES MELLITUS WITH HYPERGLYCEMIA: ICD-10-CM

## 2025-08-20 DIAGNOSIS — E78.5 HYPERLIPIDEMIA, UNSPECIFIED: ICD-10-CM

## 2025-08-20 DIAGNOSIS — Z79.4 TYPE 2 DIABETES MELLITUS WITH HYPERGLYCEMIA: ICD-10-CM

## 2025-08-20 DIAGNOSIS — I10 ESSENTIAL (PRIMARY) HYPERTENSION: ICD-10-CM

## 2025-08-20 PROCEDURE — 99214 OFFICE O/P EST MOD 30 MIN: CPT

## 2025-08-20 PROCEDURE — 95251 CONT GLUC MNTR ANALYSIS I&R: CPT

## 2025-09-13 ENCOUNTER — RX RENEWAL (OUTPATIENT)
Age: 65
End: 2025-09-13

## (undated) DEVICE — PACK IV START WITH CHG

## (undated) DEVICE — FOLEY HOLDER STATLOCK 2 WAY ADULT

## (undated) DEVICE — CLAMP BX HOT RAD JAW 3

## (undated) DEVICE — TUBING SUCTION CONN 6FT STERILE

## (undated) DEVICE — CATH IV SAFE BC 20G X 1.16" (PINK)

## (undated) DEVICE — FORCEP RADIAL JAW 4 JUMBO 2.8MM 3.2MM 240CM ORANGE DISP

## (undated) DEVICE — BIOPSY FORCEP RADIAL JAW 4 STANDARD WITH NEEDLE

## (undated) DEVICE — BRUSH COLONOSCOPY CYTOLOGY

## (undated) DEVICE — SENSOR O2 FINGER ADULT

## (undated) DEVICE — SUCTION YANKAUER NO CONTROL VENT

## (undated) DEVICE — TUBING SUCTION 20FT

## (undated) DEVICE — SOL INJ NS 0.9% 500ML 2 PORT

## (undated) DEVICE — IRRIGATOR BIO SHIELD

## (undated) DEVICE — SYR LUER LOK 50CC

## (undated) DEVICE — TUBING IV SET GRAVITY 3Y 100" MACRO

## (undated) DEVICE — CATH IV SAFE BC 22G X 1" (BLUE)

## (undated) DEVICE — POLY TRAP ETRAP

## (undated) DEVICE — ELCTR GROUNDING PAD ADULT COVIDIEN